# Patient Record
Sex: FEMALE | Race: WHITE | HISPANIC OR LATINO | Employment: UNEMPLOYED | URBAN - METROPOLITAN AREA
[De-identification: names, ages, dates, MRNs, and addresses within clinical notes are randomized per-mention and may not be internally consistent; named-entity substitution may affect disease eponyms.]

---

## 2017-06-13 ENCOUNTER — ALLSCRIPTS OFFICE VISIT (OUTPATIENT)
Dept: OTHER | Facility: OTHER | Age: 57
End: 2017-06-13

## 2017-08-03 ENCOUNTER — HOSPITAL ENCOUNTER (OUTPATIENT)
Dept: RADIOLOGY | Facility: HOSPITAL | Age: 57
Discharge: HOME | End: 2017-08-03
Payer: COMMERCIAL

## 2017-08-03 DIAGNOSIS — Z12.31 ENCOUNTER FOR SCREENING MAMMOGRAM FOR MALIGNANT NEOPLASM OF BREAST: ICD-10-CM

## 2017-08-03 PROCEDURE — G0202 SCR MAMMO BI INCL CAD: HCPCS

## 2018-01-13 VITALS
WEIGHT: 190.48 LBS | DIASTOLIC BLOOD PRESSURE: 74 MMHG | BODY MASS INDEX: 35.05 KG/M2 | HEIGHT: 62 IN | TEMPERATURE: 97.7 F | HEART RATE: 60 BPM | SYSTOLIC BLOOD PRESSURE: 108 MMHG

## 2018-01-13 NOTE — PROGRESS NOTES
Assessment    1  Osteoarthritis of multiple joints, unspecified osteoarthritis type (715 89) (M15 9)   2  Folliculitis (529 6) (F99 3)   3  Need for tetanus booster (V03 7) (Z23)   4  Obesity (278 00) (E66 9)    Plan  Esophageal reflux    · Omeprazole 40 MG Oral Capsule Delayed Release; TAKE 1 CAPSULE DAILY   · Follow-up visit in 6 months Evaluation and Treatment  Follow-up with Dr Juan R Rascon  Status: Hold For -  Scheduling  Requested for: 70Bst9617  External hemorrhoids    · Docusate Sodium 100 MG Oral Capsule; take 1 capsule by mouth twice a day if needed for  constipation  Folliculitis    · Cephalexin 500 MG Oral Capsule; TAKE 1 CAPSULE EVERY 12 HOURS UNTIL GONE  Health Maintenance    · Tdap (Boostrix)  Pain in ankle joint    · Naproxen 500 MG Oral Tablet; TAKE 1 TABLET EVERY 12 HOURS AS NEEDED  Visit for screening mammogram    · * MAMMO SCREENING BILATERAL W CAD; Status:Hold For - Scheduling; Requested for:12Stb4268;      Discussion/Summary  Discussion Summary:   Assessment/plan  1  Skin rash- Pusules visible on PE  Most likely folliculitis  Will prescribe Keflex 500mg BID X 5days  2  Bilateral foot pain- Possibly 2/2 osteoarthritis vs plantar fasciitis  Pain also occurs in RT shoulder  Currently undergoing physical therapy and naproxen for analgesia  Continue with current tx    3  Fatigue- Resolved  TSH, CMP, CBC normal  Patient on Paroxetime, trazodone with PMH of schizoaffective d/o  4  Mammogram- Will provide new script for mammogram    Counseling Documentation With Imm: The patient, patient's family was counseled regarding diagnostic results, instructions for management, importance of compliance with treatment  Medication SE Review and Pt Understands Tx: The treatment plan was reviewed with the patient/guardian   The patient/guardian understands and agrees with the treatment plan      Chief Complaint  Chief Complaint Free Text Note Form: rash under armpits      History of Present Illness  HPI: 49 yo female presenting for follow up visit  She denies fatigue but has been experiencing bilateral foot and RT shoulder pain  Since her last appointment she has been attending physical therapy once per week for her osteoarthritis and continues to take naproxen which provides bone pain relief  Patient also c/o a pruritic rash on the bilateral axillae for the past 2 weeks and does perspire but denies using deodorant  She did not complete her last mammogram due to being tardy to her appointment and requires a new script  [facility] Practices Required Assessment:   Pain Assessment   the patient states they have pain  The pain is located in the R shoulder  The patient describes the pain as sharp  (on a scale of 0 to 10, the patient rates the pain at 10 )    Prefered Language is  [place]alander  Primary Language is  [place]ander  Review of Systems  Complete-Female:   Constitutional: no fever, no chills and not feeling tired  ENT: no sore throat and no nasal discharge  Cardiovascular: no chest pain, no intermittent leg claudication, no palpitations and no lower extremity edema  Respiratory: no shortness of breath, no cough and no wheezing  Gastrointestinal: no abdominal pain, no nausea, no vomiting, no constipation and no diarrhea  Genitourinary: LMP 4 months ago, but no dysuria  Musculoskeletal: arthralgias and limb pain, but no joint swelling, no myalgias, no joint stiffness and no limb swelling  Integumentary: a rash, itching and skin lesion, but no skin wound  Neurological: No complaints of headache, no confusion, no convulsions, no numbness, no dizziness or fainting, no tingling, no limb weakness, no difficulty walking  Psychiatric: Not suicidal, no sleep disturbance, no anxiety or depression, no change in personality, no emotional problems  Active Problems    1  Allergic rhinitis (477 9) (J30 9)   2  Esophageal reflux (530 81) (K21 9)   3  External hemorrhoids (455 3) (K64 4)   4   Fatigue (780 79) (R53 83)   5  Need for influenza vaccination (V04 81) (Z23)   6  Onychomycosis of toenail (110 1) (B35 1)   7  Osteoarthritis of multiple joints, unspecified osteoarthritis type (715 89) (M15 9)   8  Pain in ankle joint (719 47) (M25 579)   9  Visit for screening mammogram (V76 12) (Z12 31)    Past Medical History    1  History of Anxiety disorder (300 00) (F41 9)   2  History of acute pharyngitis (V12 69) (Z87 09)   3  History of anemia (V12 3) (Z86 2)   4  History of bipolar disorder (V11 1) (Z86 59)   5  History of dermatitis (V13 3) (Z87 2)   6  History of insomnia (V13 89) (Z87 898)   7  History of menorrhagia (V13 29) (Z87 42)    Surgical History    1  History of  Section   2  History of Tubal Ligation    Family History  Mother    1  Family history of depression (V17 0) (Z81 8)   2  Family history of diabetes mellitus (V18 0) (Z83 3)  Father    3  Family history of Arthritis (V17 7)    Social History    · Never A Smoker  Social History Reviewed: The social history was reviewed and updated today  The social history was reviewed and is unchanged  Current Meds   1  Docusate Sodium 100 MG Oral Capsule; take 1 capsule by mouth twice a day if needed for   constipation; Therapy: 48OYL8596 to (Evaluate:76Fax4852)  Requested for: 2016; Last Rx:2016   Ordered   2  Fluticasone Propionate 50 MCG/ACT Nasal Suspension; use 2 sprays in each nostril once daily; Therapy: 52LOM1466 to (Evaluate:2016)  Requested for: 18JEE8956; Last Rx:36Yxw5187   Ordered   3  Loratadine 10 MG Oral Tablet; LINA 1 TABLETA POR VIA ORAL CADA MANANA BENEDICT FUERA   NECESARIOTAKE ONE TABLET BY MOUTH EVERY MORNING AS NEEDED; Therapy: 70Srt4877 to (Evaluate:2017)  Requested for: 80LDH0062; Last Rx:2016   Ordered   4  Naproxen 500 MG Oral Tablet; TAKE 1 TABLET EVERY 12 HOURS AS NEEDED; Therapy: 53Eil4464 to (Evaluate:11Web1636)  Requested for: 2016; Last Rx:2016   Ordered   5   Omeprazole 40 MG Oral Capsule Delayed Release; TAKE 1 CAPSULE DAILY; Therapy: 54UYU9521 to (Evaluate:53Nsj7478)  Requested for: 21Jan2016; Last Rx:21Jan2016   Ordered   6  PARoxetine HCl - 30 MG Oral Tablet; Therapy: 80JGW0994 to (Last Rx:04Oct2011)  Requested for: 54DRC0141 Ordered   7  Sitz Bath Miscellaneous; USE AS DIRECTED; Therapy: 00AMG4675 to (Last Rx:12Jun2015)  Requested for: 12Jun2015 Ordered   8  TraZODone HCl - 50 MG Oral Tablet; Therapy: 36KMT7820 to (Last Rx:04Oct2011)  Requested for: 39KQN8774 Ordered   9  Zolpidem Tartrate 10 MG Oral Tablet; Therapy: 34SSY3879 to (Last Rx:39Wjo1614)  Requested for: 79ADD7493 Ordered    Allergies    1  No Known Drug Allergies    Vitals  Vital Signs    Recorded: 50CAK4820 59:41PN   Systolic 905   Diastolic 70   Heart Rate 78   Temperature 98 1 F   Height 5 ft 2 in   Weight 187 lb 6 24 oz   BMI Calculated 34 27   BSA Calculated 1 86     Physical Exam    Constitutional   General appearance: No acute distress, well appearing and well nourished  Eyes   Conjunctiva and lids: No swelling, erythema or discharge  Ears, Nose, Mouth, and Throat   External inspection of ears and nose: Normal     Oropharynx: Normal with no erythema, edema, exudate or lesions  Pulmonary   Respiratory effort: No increased work of breathing or signs of respiratory distress  Auscultation of lungs: Clear to auscultation  Cardiovascular   Palpation of heart: Normal PMI, no thrills  Auscultation of heart: Normal rate and rhythm, normal S1 and S2, without murmurs  Examination of extremities for edema and/or varicosities: Normal     Carotid pulses: Normal     Abdomen   Abdomen: Non-tender, no masses  Musculoskeletal   Inspection/palpation of joints, bones, and muscles: Normal     Skin   Skin and subcutaneous tissue: Abnormal   papular/vesicular rash on the axillae bilaterally with multiple skin tags visible  Neurologic   Cranial nerves: Cranial nerves 2-12 intact      Psychiatric   Mood and affect: Normal          Results/Data  PHQ-2 Adult Depression Screening 84Poo0849 12:25PM User, Ahs     Test Name Result Flag Reference   PHQ-2 Adult Depression Score 0     Over the last two weeks, how often have you been bothered by any of the following problems? Little interest or pleasure in doing things: Not at all - 0  Feeling down, depressed, or hopeless: Not at all - 0   PHQ-2 Adult Depression Screening Negative         Attending Note  Attending Note: Attending Note: I discussed the case with the Resident and reviewed the Resident's note and I agree with the Resident management plan as it was presented to me  Level of Participation: I was present in clinic and examined the patient  Diagnosis and Plan: Multiple pustules along bra line both axillae  Advised to not wear bra in the house, use keflex and warm packs on the areas  Needs Tdap today  Mammo slip given  Future Appointments    Date/Time Provider Specialty Site   02/02/2017 02:10 PM PRASAD Cyr   [facility] [address] PCP     Signatures   Electronically signed by : PRASAD Junior ; Aug 30 2016  1:22PM EST                       (Author)    Electronically signed by : PRASAD Katz ; Aug 30 2016  1:38PM EST                       (Co-author)

## 2018-02-22 ENCOUNTER — OFFICE VISIT (OUTPATIENT)
Dept: INTERNAL MEDICINE CLINIC | Facility: CLINIC | Age: 58
End: 2018-02-22
Payer: COMMERCIAL

## 2018-02-22 VITALS
SYSTOLIC BLOOD PRESSURE: 124 MMHG | DIASTOLIC BLOOD PRESSURE: 90 MMHG | TEMPERATURE: 97.7 F | HEIGHT: 62 IN | HEART RATE: 84 BPM | BODY MASS INDEX: 35.13 KG/M2 | WEIGHT: 190.92 LBS

## 2018-02-22 DIAGNOSIS — N39.3 STRESS INCONTINENCE: ICD-10-CM

## 2018-02-22 DIAGNOSIS — J06.9 VIRAL UPPER RESPIRATORY TRACT INFECTION: Primary | ICD-10-CM

## 2018-02-22 DIAGNOSIS — F32.A DEPRESSION: ICD-10-CM

## 2018-02-22 DIAGNOSIS — G47.00 INSOMNIA: ICD-10-CM

## 2018-02-22 DIAGNOSIS — Z11.59 ENCOUNTER FOR HEPATITIS C SCREENING TEST FOR LOW RISK PATIENT: ICD-10-CM

## 2018-02-22 DIAGNOSIS — Z23 NEED FOR PROPHYLACTIC VACCINATION AND INOCULATION AGAINST INFLUENZA: ICD-10-CM

## 2018-02-22 PROCEDURE — 99213 OFFICE O/P EST LOW 20 MIN: CPT | Performed by: INTERNAL MEDICINE

## 2018-02-22 PROCEDURE — 3725F SCREEN DEPRESSION PERFORMED: CPT | Performed by: INTERNAL MEDICINE

## 2018-02-22 NOTE — PROGRESS NOTES
INTERNAL MEDICINE FOLLOW-UP OFFICE VISIT  [facility]  [address] [place] [address]    NAME: Max Otoole  AGE: 56 y o  SEX: female    DATE OF ENCOUNTER: 2/22/2018    Assessment and Plan     Problem List Items Addressed This Visit        Respiratory    Viral upper respiratory tract infection - Primary     Start Delsym for cough HS PRN         Relevant Medications    Dextromethorphan-Guaifenesin (DELSYM COUGH/CHEST CONGEST DM) 5-100 MG/5ML LIQD       Other    Encounter for hepatitis C screening test for low risk patient      recently diagnosed, pt would like to be screened, will check today         Relevant Orders    Hepatitis C Ab W/Refl To HCV RNA, Qn, PCR    Depression    Stress incontinence     Concurrent UTI symptoms, check UA         Relevant Orders    Urinalysis with reflex to microscopic    Insomnia     On Ambien by Psych and tolerating well           Other Visit Diagnoses     Need for prophylactic vaccination and inoculation against influenza            Orders Placed This Encounter   Procedures    Hepatitis C Ab W/Refl To HCV RNA, Qn, PCR    Urinalysis with reflex to microscopic     - Counseling Documentation: patient was counseled regarding: diagnostic results, instructions for management, risk factor reductions, prognosis, patient and family education, impressions, risks and benefits of treatment options and importance of compliance with treatment    Chief Complaint     Chief Complaint   Patient presents with    Physical Exam     History of Present Illness     Hubert Zazueta presents today for physical exam and for Hepatitis screening  Her  was recently diagnosed with Hepatitis C and she would like to be screened  She also reports recent URI symptoms with persistent cough which has been keeping her up at night  She denies fever or chills, nausea, vomiting or diarrhea   She does report stress incontinence of urine with coughing for the past week       Urinary Frequency    This is a new problem  The current episode started in the past 7 days  The problem occurs every urination  The problem has been unchanged  The quality of the pain is described as burning  The patient is experiencing no pain  There has been no fever  She is sexually active  There is no history of pyelonephritis  Associated symptoms include frequency  Pertinent negatives include no chills, flank pain, hematuria, nausea, sweats or vomiting  She has tried nothing for the symptoms  URI    This is a new problem  The current episode started in the past 7 days  The problem has been gradually improving  There has been no fever  Associated symptoms include congestion and coughing  Pertinent negatives include no nausea, plugged ear sensation, sinus pain, sore throat or vomiting  She has tried nothing for the symptoms  The following portions of the patient's history were reviewed and updated as appropriate: allergies, current medications, past family history, past medical history, past social history, past surgical history and problem list     Review of Systems     Review of Systems   Constitutional: Negative for chills, fatigue and fever  HENT: Positive for congestion  Negative for sinus pain and sore throat  Respiratory: Positive for cough  Cardiovascular: Negative  Gastrointestinal: Negative for nausea and vomiting  Endocrine: Negative  Genitourinary: Positive for frequency  Negative for flank pain and hematuria  Musculoskeletal: Negative  Skin: Negative  Allergic/Immunologic: Negative  Neurological: Negative  Psychiatric/Behavioral: Positive for dysphoric mood  All other systems reviewed and are negative        Active Problem List     Patient Active Problem List   Diagnosis    Encounter for hepatitis C screening test for low risk patient    Viral upper respiratory tract infection    Depression    Stress incontinence    Insomnia       Objective     BP 124/90   Pulse 84   Temp 97 7 °F (36 5 °C)   Ht 5' 2" (1 575 m)   Wt 86 6 kg (190 lb 14 7 oz)   BMI 34 92 kg/m²     Physical Exam   Constitutional: She is oriented to person, place, and time  She appears well-developed and well-nourished  No distress  HENT:   Head: Normocephalic and atraumatic  Right Ear: External ear normal    Left Ear: External ear normal    Mouth/Throat: Oropharynx is clear and moist  No oropharyngeal exudate  Eyes: Conjunctivae are normal  Pupils are equal, round, and reactive to light  Right eye exhibits no discharge  Left eye exhibits no discharge  No scleral icterus  Neck: Normal range of motion  Neck supple  No JVD present  No tracheal deviation present  No thyromegaly present  Cardiovascular: Normal rate, regular rhythm and intact distal pulses  Exam reveals no gallop and no friction rub  No murmur heard  Pulmonary/Chest: Effort normal and breath sounds normal  No respiratory distress  She has no wheezes  Abdominal: Soft  There is tenderness  Musculoskeletal: Normal range of motion  She exhibits no edema or tenderness  Neurological: She is alert and oriented to person, place, and time  Skin: Skin is warm and dry  No rash noted  She is not diaphoretic  Psychiatric: She has a normal mood and affect  Nursing note and vitals reviewed      Pertinent Laboratory/Diagnostic Studies:  No labs for review    Current Medications     Current Outpatient Prescriptions:     loratadine (CLARITIN) 10 mg tablet, Take by mouth, Disp: , Rfl:     naproxen (NAPROSYN) 500 mg tablet, Take 1 tablet by mouth every 12 (twelve) hours as needed, Disp: , Rfl:     omeprazole (PriLOSEC) 40 MG capsule, Take 1 capsule by mouth daily, Disp: , Rfl:     PARoxetine (PAXIL) 20 mg tablet, Take 20 mg by mouth daily, Disp: , Rfl: 1    traZODone (DESYREL) 50 mg tablet, TAKE 1 TABLET BY MOUTH AT BEDTIME, MAY TAKE UP TO 2 TABLETS FOR SLEEP, Disp: , Rfl: 1    zolpidem (AMBIEN) 10 mg tablet, Take 10 mg by mouth daily, Disp: , Rfl: 1    Dextromethorphan-Guaifenesin (DELSYM COUGH/CHEST CONGEST DM) 5-100 MG/5ML LIQD, Take 10 mL by mouth daily at bedtime as needed (cough), Disp: 118 mL, Rfl: 0    docusate sodium (COLACE) 100 mg capsule, Take by mouth, Disp: , Rfl:     fluticasone (FLONASE) 50 mcg/act nasal spray, 2 sprays into each nostril daily, Disp: , Rfl:     Misc  Devices (SITZ BATH) MISC, by Does not apply route, Disp: , Rfl:     Health Maintenance     There are no preventive care reminders to display for this patient  Immunization History   Administered Date(s) Administered    Influenza 11/02/2017    Influenza Quadrivalent Preservative Free 3 years and older IM 01/21/2016    Tdap 08/30/2016     Kye STANTON    Internal Medicine PGY-3  2/22/2018 5:55 PM

## 2018-02-22 NOTE — PATIENT INSTRUCTIONS
Incontinencia urinaria   CUIDADO AMBULATORIO:   La incontinencia urinaria  se produce cuando usted pierde el control de cruz vejiga  La incontinencia urinaria ocurre cuando la vejiga no puede almacenar o vaciar la orina correctamente  Los 3 tipos más comunes de incontinencia urinaria son la incontinencia de esfuerzo o por estrés, la incontinencia de [place], o ambas  Los síntomas más comunes incluyen los siguientes:   · Mc vejiga que no se vacía por completo al Carren Ovens    · Dilcia Slay y sentir deseos de orinar de inmediato    · Gotear orina mientras duerme o despertarse con la urgencia de orinar    · Pérdida de orina cuando tose, estornuda, hace ejercicio o se ríe  Busque atención médica de inmediato si:   · Usted tiene dolor intenso  · Usted está confundido o no puede pensar con claridad  Pregúntele a cruz Lillian Luis vitaminas y minerales son adecuados para usted  · Usted tiene fiebre  · Usted orina con maría  · Siente dolor al Carren Ovens  · Usted tiene dolor nuevo o peor, aun después del [place]  · Cruz orina está turbia o tiene mal olor  · Cruz boca está seca o tiene cambios en la visión  · Usted tiene preguntas o inquietudes acerca de cruz condición o cuidado  El tratamiento para la incontinencia urinaria  podría incluir medicamentos para ayudarlo a reforzar el control de cruz vejiga  Es posible que usted necesite mc cirugía u otros procedimientos para fortalecer los músculos del piso pélvico o para reparar daños a partes de cruz sistema urinario  Renetta ejercicios del músculo pélvico con frecuencia:  Lynnette músculos pélvicos le ayudan a detener la orina  Contraiga estos músculos por 5 segundos, después relájelos por 5 segundos  Aumente gradualmente a 10 segundos  Renetta 3 series de 15 repeticiones al día o ramona se le indique  [place] le ayudará a fortalecer lynnette músculos pélvicos y a mejorar el control de cruz vejiga    Entrene cruz vejiga:  Vaya al baño layla horas programadas, ramona por ejemplo cada 2 horas, aunque no sienta la urgencia de ir  Usted también puede tratar de aguantar dang orina cuando usted tiene ganas de ir al baño  Por ejemplo, contenga dang orina por 5 minutos cuando sienta ganas de orinar  Conforme se le vaya facilitando, contenga la orina por 10 minutos  Cuidados personales:   · Un catéter  podría usarse para ayudar a vaciar dang vejiga  Un catéter es mc sonda pequeña de plástico que se coloca en dang vejiga para drenar dang orina  Dang médico podría indicarle que use un catéter para evitar que dang vejiga se llene demasiado y que se gotee la [facility]  · Mantenga un registro de la incontinencia urinaria  Anote con qué frecuencia se gotea dang orina y cuánta orina pierde  Escriba qué estaba haciendo cuando se goteó la [facility]  · [address] se le haya indicado  Pregunte a dang médico sobre la cantidad de líquido que necesita carlie todos los días y cuáles le recomienda  Es posible que deba controlar la cantidad de líquido que marcy para ayudar a controlar el [facility]  Limite o no consuma bebidas que contengan cafeína o alcohol  No tome líquido antes de irse a dormir  · Prevenga el estreñimiento  Consuma mc variedad de alimentos ricos en fibra  Los [employer], los frijoles, las verduras y los panes integrales son Deetta Jazmyn  El jugo de ciruelas podría ayudarle a suavizar las evacuaciones intestinales  Caminar es la mejor forma de estimular lynnette intestinos para tener mc evacuación intestinal     · Ejercítese regularmente y [place] un peso saludable  Pregunte a dang médico cuánto debería pesar y pídale que le recomiende un programa de ejercicios adecuado para usted  La pérdida de peso y el ejercicio harán que dang vejiga esté bajo menos presión y contribuirán a controlar el [facility]  Pida que le ayude a crear un plan para bajar de peso si usted tiene sobrepeso  Acuda a lynnette consultas de control con dang médico según le indicaron    Anote lynnette preguntas para que se acuerde de ck layla lynnette visitas  © 2017 [address] Information is for End User's use only and may not be sold, redistributed or otherwise used for commercial purposes  All illustrations and images included in CareNotes® are the copyrighted property of A D A M , Inc  or Shahab Hawk  Esta información es sólo para uso en educación  Cruz intención no es darle un consejo médico sobre enfermedades o tratamientos  Colsulte con cruz Joseph La farmacéutico antes de seguir cualquier régimen médico para saber si es seguro y efectivo para usted

## 2018-02-28 NOTE — PROGRESS NOTES
Spoke with patient and reminded her to have her URINALYSIS WITH REFLEX TO MICROSCOPIC done, patient states she will go have it done

## 2018-04-12 ENCOUNTER — LAB (OUTPATIENT)
Dept: LAB | Facility: HOSPITAL | Age: 58
End: 2018-04-12
Payer: COMMERCIAL

## 2018-04-12 DIAGNOSIS — Z11.59 ENCOUNTER FOR HEPATITIS C SCREENING TEST FOR LOW RISK PATIENT: ICD-10-CM

## 2018-04-12 LAB
BILIRUB UR QL STRIP: NEGATIVE
CLARITY UR: CLEAR
COLOR UR: YELLOW
GLUCOSE UR STRIP-MCNC: NEGATIVE MG/DL
HGB UR QL STRIP.AUTO: NEGATIVE
KETONES UR STRIP-MCNC: NEGATIVE MG/DL
LEUKOCYTE ESTERASE UR QL STRIP: NEGATIVE
NITRITE UR QL STRIP: NEGATIVE
PH UR STRIP.AUTO: 7 [PH] (ref 4.5–8)
PROT UR STRIP-MCNC: NEGATIVE MG/DL
SP GR UR STRIP.AUTO: 1.02 (ref 1–1.03)
UROBILINOGEN UR QL STRIP.AUTO: 0.2 E.U./DL

## 2018-04-12 PROCEDURE — 86803 HEPATITIS C AB TEST: CPT

## 2018-04-12 PROCEDURE — 36415 COLL VENOUS BLD VENIPUNCTURE: CPT

## 2018-04-12 PROCEDURE — 81003 URINALYSIS AUTO W/O SCOPE: CPT | Performed by: INTERNAL MEDICINE

## 2018-04-14 LAB — MISCELLANEOUS LAB TEST RESULT: NORMAL

## 2018-04-16 NOTE — PROGRESS NOTES
4/16/2018  Re: Alyson Ngo    Results reviewed: Hepatitis C labs  Abnormalities include: None, Hep C negative for Ab   Recommendations: No intervention at this time

## 2018-05-22 ENCOUNTER — OFFICE VISIT (OUTPATIENT)
Dept: INTERNAL MEDICINE CLINIC | Facility: CLINIC | Age: 58
End: 2018-05-22
Payer: COMMERCIAL

## 2018-05-22 VITALS
BODY MASS INDEX: 35.62 KG/M2 | HEIGHT: 62 IN | DIASTOLIC BLOOD PRESSURE: 90 MMHG | WEIGHT: 193.56 LBS | TEMPERATURE: 98 F | HEART RATE: 104 BPM | SYSTOLIC BLOOD PRESSURE: 128 MMHG

## 2018-05-22 DIAGNOSIS — Z12.39 BREAST CANCER SCREENING: ICD-10-CM

## 2018-05-22 DIAGNOSIS — G62.9 NEUROPATHY: ICD-10-CM

## 2018-05-22 DIAGNOSIS — M62.838 CERVICAL PARASPINAL MUSCLE SPASM: ICD-10-CM

## 2018-05-22 DIAGNOSIS — Z12.31 BREAST CANCER SCREENING BY MAMMOGRAM: Primary | ICD-10-CM

## 2018-05-22 PROBLEM — Z12.11 COLON CANCER SCREENING: Status: ACTIVE | Noted: 2018-05-22

## 2018-05-22 PROCEDURE — 99213 OFFICE O/P EST LOW 20 MIN: CPT | Performed by: INTERNAL MEDICINE

## 2018-05-22 RX ORDER — CYCLOBENZAPRINE HCL 5 MG
10 TABLET ORAL 3 TIMES DAILY PRN
Qty: 30 TABLET | Refills: 0 | Status: SHIPPED | OUTPATIENT
Start: 2018-05-22 | End: 2019-01-08

## 2018-05-22 NOTE — PATIENT INSTRUCTIONS
Dolor de marcel crónico   CUIDADO AMBULATORIO:   El dolor de marcel crónico  podría comenzar a acumularse lentamente con el tiempo  El dolor de marcel es crónico si dura más de 3 meses  El dolor podría ir y venir, o podría empeorar con ciertos movimientos  El dolor podría sentirse solamente en dang marcel, o podría pasarse a evelina brazos, espalda u hombros  Es posible que usted sienta dolor que comienza en otra parte de dang cuerpo y se pasa a dang marcel  Usted podría sentir dolor de marcel por años  Algunos tipos de dolor de marcel pueden ser permanentes  Busque atención médica de inmediato si:   · Usted tiene mc lesión que le provoca dolor en el marcel y dolor punzante debajo de evelina brazos o piernas  · Dang dolor de marcel se agrava repentinamente  · Usted tiene dolor de marcel junto con entumecimiento, hormigueo o debilidad en evelina brazos o piernas  · Usted tiene rigidez en el marcel, dolor de [place]  Pregúntele a dang [place] vitaminas y minerales son adecuados para usted  · Usted tiene nuevos síntomas o evelina síntomas empeoran  · Evelina síntomas continúan aún después del [place]  · Usted tiene preguntas o inquietudes acerca de dang condición o cuidado  El tratamiento  podría incluir cualquiera de lo siguiente, dependiendo de lo que esté provocando dang dolor:  · Medicamentos,  podrían ser recetados o recomendados para el dolor por dang médico  Es posible que usted necesite medicamento para tratar el dolor de nervios o para detener los espasmos musculares  También podrían darle medicamentos para reducir la inflamación  Dang médico podría inyectar medicamento a un nervio para bloquear el dolor  El medicamento de venta sin receta AINEs o acetaminofén podría ser recomendado para tratar el dolor o inflamación leve  · La tracción  se [place] para aliviar la presión de los nervios  Le estirarán la candy cuidadosamente alejándola de dang marcel   Leola estira los músculos y los ligamentos y le da más espacio a dang columna  Dang médico le indicará qué tipo de tracción ayudará a dang dolor de marcel  No  use dispositivos de tracción en dang casa a menos que se lo indique dang médico     · Cirugía  podría ser necesaria si el dolor es severo o si otros tratamientos no funcionan  La cirugía no ayudará para todos los tipos de [id number]  Usted podría necesitar mc cirugía para estabilizar un hueso fracturado o para remover un tumor  La cirugía también podría usarse para ampliar la columna vertebral estrecha o para remover un disco [employer] del marcel  Controle o evite el dolor de marcel crónico:   · Repose el marcel ramona se lo indiquen  No realice movimientos repentinos, ramona voltear dang candy rápidamente  Dang médico podría recomendarle que use un collarín cervical por un periodo corto de [place]  El collarín evitará que usted [place] dang [place]  [place] ayudará a darle tiempo a dang marcel para que sane si es que mc lesión está provocando dang dolor  Pregunte a dnag médico cuándo puede volver a practicar deportes o realizar otras actividades cotidianas  · Aplique calor según indicaciones  El calor ayuda a aliviar el dolor y la inflamación  Use mc envoltura caliente o empape mc toalla pequeña en agua tibia  Escurra el exceso de [place]  Aplique la venda caliente o la toalla por 20 minutos cada hora o ramona se le indique  · Aplique hielo según las indicaciones  El hielo ayuda a aliviar el dolor y la inflamación, y a evitar daño al tejido  Use un paquete con hielo o ponga hielo en mc bolsa  Cubra el paquete con hielo o la espalda con mc toalla antes de aplicarlo en dang marcel  Aplique el paquete de hielo o la bolsa por 15 minutos cada hora o ramona se lo indiquen  Dang médico puede indicarle con qué frecuencia aplicar el hielo  · [place] ejercicios para el marcel ramona se lo indiquen  Los ejercicios para el marcel ayudan a [employer] y a aumentar el rango de [place]   Dang médico le indicará cuáles ejercicios son adecuados para usted  Podría darle instrucciones o podría recomendarle que acuda con un fisioterapeuta  Dang médico o terapeuta pueden asegurarse que usted esté haciendo estos ejercicios correctamente  · Mantenga mc buena postura  Trate de mantener dang candy y hombros levantados cuando se siente  Si usted trabaja en frente de mc computadora, asegúrese de que el monitor esté al nivel adecuado  Usted no debería tener que mirar hacia abajo para lisa la pantalla  Tampoco debe tener que inclinarse hacia adelante para poder leer lo que está en la pantalla  Asegúrese de que dang teclado, ratón y otros artículos de computadora estén colocados en donde usted no tenga que extender lynnette hombros para alcanzarlos  [place] a menudo si usted trabaja frente a mc computadora o permanece sentado layla largos períodos  Estírese o camine para [place] de dang marcel relajados  Acuda a lynnette consultas de control con dang médico según le indicaron  Dang médico podría referirlo a un especialista si dang dolor no mejora con el tratamiento  Anote lynnette preguntas para que se acuerde de hacerlas layla lynnette visitas  © 2017 [facility] Information is for End User's use only and may not be sold, redistributed or otherwise used for commercial purposes  All illustrations and images included in CareNotes® are the copyrighted property of A D A M , Inc  or Shahab Hawk  Esta información es sólo para uso en educación  Dang intención no es darle un consejo médico sobre enfermedades o tratamientos  Colsulte con dang [employer] antes de seguir cualquier régimen médico para saber si es seguro y efectivo para usted

## 2018-05-22 NOTE — PROGRESS NOTES
INTERNAL MEDICINE FOLLOW-UP OFFICE VISIT  [facility]  [address] [place] [address]    NAME: Bindu Cedeño  AGE: 56 y o  SEX: female    DATE OF ENCOUNTER: 5/22/2018    Assessment and Plan     Problem List Items Addressed This Visit        Nervous and Auditory    Neuropathy (Chronic)    Relevant Orders    CBC (Includes Diff/Plt) (Refl)    Comprehensive metabolic panel    Vitamin B12       Other    Colon cancer screening - Primary     C-Scope 9/2014: normal, repeat in 10yrs (2024)         Breast cancer screening     Last Mammo 8/2017, Viviana Budds 1 neg, repeat due 8/2018         Cervical paraspinal muscle spasm    Relevant Medications    cyclobenzaprine (FLEXERIL) 5 mg tablet        Orders Placed This Encounter   Procedures    CBC (Includes Diff/Plt) (Refl)    Comprehensive metabolic panel    Vitamin B12     - Counseling Documentation: patient was counseled regarding: diagnostic results, instructions for management, risk factor reductions, prognosis, patient and family education, impressions, risks and benefits of treatment options and importance of compliance with treatment    Chief Complaint     Chief Complaint   Patient presents with    Follow-up     routine     History of Present Illness     Gissell Montanez is here for evaluation of cervical neck pain as well as numbness and tingling in both his hands and feet for the past few months  She reports that she is having some episodes of tripping and falls due to the numbness and is worried that she cannot go up and down the stairs in her apartment anymore and she would like a letter to the housing authority to have her move to a one story apt  She also complains of cervical neck pain and spasm that is worse over the past few days and is causing her difficulty trying to sleep at night due to pain  She has not tried anything for it         The following portions of the patient's history were reviewed and updated as appropriate: allergies, current medications, past family history, past medical history, past social history, past surgical history and problem list     Review of Systems     Review of Systems   Constitutional: Negative for chills and fever  HENT: Negative  Eyes: Negative  Respiratory: Negative  Cardiovascular: Negative  Gastrointestinal: Negative  Endocrine: Negative  Genitourinary: Negative  Musculoskeletal: Positive for myalgias and neck pain  Skin: Negative  Neurological: Positive for numbness  Hematological: Negative  Psychiatric/Behavioral: Negative  All other systems reviewed and are negative  Active Problem List     Patient Active Problem List   Diagnosis    Encounter for hepatitis C screening test for low risk patient    Viral upper respiratory tract infection    Depression    Stress incontinence    Insomnia    Colon cancer screening    Breast cancer screening    Neuropathy    Cervical paraspinal muscle spasm       Objective     /90   Pulse 104   Temp 98 °F (36 7 °C)   Ht 5' 2" (1 575 m)   Wt 87 8 kg (193 lb 9 oz)   BMI 35 40 kg/m²     Physical Exam   Constitutional: She is oriented to person, place, and time  She appears well-developed and well-nourished  No distress  HENT:   Head: Normocephalic and atraumatic  Mouth/Throat: Oropharynx is clear and moist  No oropharyngeal exudate  Eyes: Conjunctivae are normal  Pupils are equal, round, and reactive to light  No scleral icterus  Neck: Normal range of motion  Neck supple  No tracheal deviation present  No thyromegaly present  Cardiovascular: Normal rate, regular rhythm, normal heart sounds and intact distal pulses  Exam reveals no gallop and no friction rub  No murmur heard  Pulmonary/Chest: Effort normal  She has no wheezes  Abdominal: Soft  Bowel sounds are normal    Musculoskeletal: Normal range of motion  She exhibits no edema or deformity     Cervical paraspinal muscle spasm   Neurological: She is alert and oriented to person, place, and time  No cranial nerve deficit  Coordination normal    Skin: Skin is warm and dry  She is not diaphoretic  Psychiatric: She has a normal mood and affect  Nursing note and vitals reviewed  Pertinent Laboratory/Diagnostic Studies:    Current Medications     Current Outpatient Prescriptions:     [facility] TUSSIN -10 MG/5ML oral liquid, TAKE 10 ML BY MOUTH DAILY AT BEDTIME AS NEEDED (COUGH), Disp: , Rfl: 0    Dextromethorphan-Guaifenesin (DELSYM COUGH/CHEST CONGEST DM) 5-100 MG/5ML LIQD, Take 10 mL by mouth daily at bedtime as needed (cough), Disp: 118 mL, Rfl: 0    docusate sodium (COLACE) 100 mg capsule, Take by mouth, Disp: , Rfl:     fluticasone (FLONASE) 50 mcg/act nasal spray, 2 sprays into each nostril daily, Disp: , Rfl:     loratadine (CLARITIN) 10 mg tablet, Take by mouth, Disp: , Rfl:     naproxen (NAPROSYN) 500 mg tablet, Take 1 tablet by mouth every 12 (twelve) hours as needed, Disp: , Rfl:     omeprazole (PriLOSEC) 40 MG capsule, Take 1 capsule by mouth daily, Disp: , Rfl:     PARoxetine (PAXIL) 20 mg tablet, Take 20 mg by mouth daily, Disp: , Rfl: 1    traZODone (DESYREL) 50 mg tablet, TAKE 1 TABLET BY MOUTH AT BEDTIME, MAY TAKE UP TO 2 TABLETS FOR SLEEP, Disp: , Rfl: 1    zolpidem (AMBIEN) 10 mg tablet, Take 10 mg by mouth daily, Disp: , Rfl: 1    cyclobenzaprine (FLEXERIL) 5 mg tablet, Take 2 tablets (10 mg total) by mouth 3 (three) times a day as needed for muscle spasms, Disp: 30 tablet, Rfl: 0    Misc   Devices (SITZ BATH) MISC, by Does not apply route, Disp: , Rfl:     Health Maintenance     Health Maintenance   Topic Date Due    HIV SCREENING  1960    Depression Screening PHQ-9  12/29/1974    INFLUENZA VACCINE  09/01/2018    COLONOSCOPY  11/19/2024    DTaP,Tdap,and Td Vaccines (2 - Td) 08/30/2026    Hepatitis C Screening  Completed     Immunization History   Administered Date(s) Administered    Influenza 11/02/2017    Influenza Quadrivalent Preservative Free 3 years and older IM 01/21/2016    Tdap 08/30/2016     James STANTON    Internal Medicine PGY-3  5/22/2018 5:54 PM

## 2018-05-23 ENCOUNTER — APPOINTMENT (OUTPATIENT)
Dept: LAB | Facility: HOSPITAL | Age: 58
End: 2018-05-23
Payer: COMMERCIAL

## 2018-05-23 DIAGNOSIS — G62.9 NEUROPATHY: ICD-10-CM

## 2018-05-23 DIAGNOSIS — R73.9 HYPERGLYCEMIA: Primary | ICD-10-CM

## 2018-05-23 DIAGNOSIS — G62.9 PERIPHERAL NERVE DISORDER: Primary | ICD-10-CM

## 2018-05-23 LAB
ALBUMIN SERPL BCP-MCNC: 3.8 G/DL (ref 3.5–5)
ALP SERPL-CCNC: 56 U/L (ref 46–116)
ALT SERPL W P-5'-P-CCNC: 40 U/L (ref 12–78)
ANION GAP SERPL CALCULATED.3IONS-SCNC: 6 MMOL/L (ref 4–13)
AST SERPL W P-5'-P-CCNC: 26 U/L (ref 5–45)
BASOPHILS # BLD AUTO: 0.01 THOUSANDS/ΜL (ref 0–0.1)
BASOPHILS NFR BLD AUTO: 0 % (ref 0–1)
BILIRUB SERPL-MCNC: 0.44 MG/DL (ref 0.2–1)
BUN SERPL-MCNC: 13 MG/DL (ref 5–25)
CALCIUM SERPL-MCNC: 9 MG/DL (ref 8.3–10.1)
CHLORIDE SERPL-SCNC: 103 MMOL/L (ref 100–108)
CO2 SERPL-SCNC: 28 MMOL/L (ref 21–32)
CREAT SERPL-MCNC: 0.7 MG/DL (ref 0.6–1.3)
EOSINOPHIL # BLD AUTO: 0.14 THOUSAND/ΜL (ref 0–0.61)
EOSINOPHIL NFR BLD AUTO: 2 % (ref 0–6)
ERYTHROCYTE [DISTWIDTH] IN BLOOD BY AUTOMATED COUNT: 13.2 % (ref 11.6–15.1)
GFR SERPL CREATININE-BSD FRML MDRD: 98 ML/MIN/1.73SQ M
GLUCOSE P FAST SERPL-MCNC: 124 MG/DL (ref 65–99)
HCT VFR BLD AUTO: 41.3 % (ref 34.8–46.1)
HGB BLD-MCNC: 13.2 G/DL (ref 11.5–15.4)
LYMPHOCYTES # BLD AUTO: 1.43 THOUSANDS/ΜL (ref 0.6–4.47)
LYMPHOCYTES NFR BLD AUTO: 22 % (ref 14–44)
MCH RBC QN AUTO: 28.6 PG (ref 26.8–34.3)
MCHC RBC AUTO-ENTMCNC: 32 G/DL (ref 31.4–37.4)
MCV RBC AUTO: 89 FL (ref 82–98)
MONOCYTES # BLD AUTO: 0.5 THOUSAND/ΜL (ref 0.17–1.22)
MONOCYTES NFR BLD AUTO: 8 % (ref 4–12)
NEUTROPHILS # BLD AUTO: 4.31 THOUSANDS/ΜL (ref 1.85–7.62)
NEUTS SEG NFR BLD AUTO: 67 % (ref 43–75)
NRBC BLD AUTO-RTO: 0 /100 WBCS
PLATELET # BLD AUTO: 232 THOUSANDS/UL (ref 149–390)
PMV BLD AUTO: 12 FL (ref 8.9–12.7)
POTASSIUM SERPL-SCNC: 4.2 MMOL/L (ref 3.5–5.3)
PROT SERPL-MCNC: 8.2 G/DL (ref 6.4–8.2)
RBC # BLD AUTO: 4.62 MILLION/UL (ref 3.81–5.12)
SODIUM SERPL-SCNC: 137 MMOL/L (ref 136–145)
VIT B12 SERPL-MCNC: 426 PG/ML (ref 100–900)
WBC # BLD AUTO: 6.4 THOUSAND/UL (ref 4.31–10.16)

## 2018-05-23 PROCEDURE — 85025 COMPLETE CBC W/AUTO DIFF WBC: CPT

## 2018-05-23 PROCEDURE — 36415 COLL VENOUS BLD VENIPUNCTURE: CPT

## 2018-05-23 PROCEDURE — 82607 VITAMIN B-12: CPT

## 2018-05-23 PROCEDURE — 80053 COMPREHEN METABOLIC PANEL: CPT

## 2018-05-25 DIAGNOSIS — G62.9 NEUROPATHY: Primary | Chronic | ICD-10-CM

## 2018-05-29 RX ORDER — NAPROXEN 500 MG/1
500 TABLET ORAL EVERY 12 HOURS PRN
Qty: 60 TABLET | Refills: 0 | Status: SHIPPED | OUTPATIENT
Start: 2018-05-29 | End: 2019-08-05

## 2018-06-19 ENCOUNTER — APPOINTMENT (OUTPATIENT)
Dept: LAB | Facility: HOSPITAL | Age: 58
End: 2018-06-19
Payer: COMMERCIAL

## 2018-06-19 ENCOUNTER — OFFICE VISIT (OUTPATIENT)
Dept: INTERNAL MEDICINE CLINIC | Facility: CLINIC | Age: 58
End: 2018-06-19
Payer: COMMERCIAL

## 2018-06-19 ENCOUNTER — TRANSCRIBE ORDERS (OUTPATIENT)
Dept: LAB | Facility: HOSPITAL | Age: 58
End: 2018-06-19

## 2018-06-19 VITALS
DIASTOLIC BLOOD PRESSURE: 78 MMHG | HEIGHT: 62 IN | HEART RATE: 92 BPM | BODY MASS INDEX: 35.38 KG/M2 | SYSTOLIC BLOOD PRESSURE: 130 MMHG | WEIGHT: 192.24 LBS | TEMPERATURE: 97.9 F

## 2018-06-19 DIAGNOSIS — J30.2 SEASONAL ALLERGIC RHINITIS, UNSPECIFIED TRIGGER: ICD-10-CM

## 2018-06-19 DIAGNOSIS — R73.9 BLOOD GLUCOSE ELEVATED: Primary | ICD-10-CM

## 2018-06-19 DIAGNOSIS — G62.9 NEUROPATHY: Primary | Chronic | ICD-10-CM

## 2018-06-19 DIAGNOSIS — M62.838 CERVICAL PARASPINAL MUSCLE SPASM: ICD-10-CM

## 2018-06-19 PROBLEM — R73.09 ELEVATED GLUCOSE: Status: ACTIVE | Noted: 2018-06-19

## 2018-06-19 LAB
BASOPHILS # BLD AUTO: 0.03 THOUSANDS/ΜL (ref 0–0.1)
BASOPHILS NFR BLD AUTO: 0 % (ref 0–1)
EOSINOPHIL # BLD AUTO: 0.11 THOUSAND/ΜL (ref 0–0.61)
EOSINOPHIL NFR BLD AUTO: 1 % (ref 0–6)
ERYTHROCYTE [DISTWIDTH] IN BLOOD BY AUTOMATED COUNT: 12.7 % (ref 11.6–15.1)
EST. AVERAGE GLUCOSE BLD GHB EST-MCNC: 126 MG/DL
HBA1C MFR BLD: 6 % (ref 4.2–6.3)
HCT VFR BLD AUTO: 40 % (ref 34.8–46.1)
HGB BLD-MCNC: 12.6 G/DL (ref 11.5–15.4)
IMM GRANULOCYTES # BLD AUTO: 0.02 THOUSAND/UL (ref 0–0.2)
IMM GRANULOCYTES NFR BLD AUTO: 0 % (ref 0–2)
LYMPHOCYTES # BLD AUTO: 1.61 THOUSANDS/ΜL (ref 0.6–4.47)
LYMPHOCYTES NFR BLD AUTO: 21 % (ref 14–44)
MCH RBC QN AUTO: 28.3 PG (ref 26.8–34.3)
MCHC RBC AUTO-ENTMCNC: 31.5 G/DL (ref 31.4–37.4)
MCV RBC AUTO: 90 FL (ref 82–98)
MONOCYTES # BLD AUTO: 0.59 THOUSAND/ΜL (ref 0.17–1.22)
MONOCYTES NFR BLD AUTO: 8 % (ref 4–12)
NEUTROPHILS # BLD AUTO: 5.4 THOUSANDS/ΜL (ref 1.85–7.62)
NEUTS SEG NFR BLD AUTO: 70 % (ref 43–75)
NRBC BLD AUTO-RTO: 0 /100 WBCS
PLATELET # BLD AUTO: 233 THOUSANDS/UL (ref 149–390)
PMV BLD AUTO: 12.5 FL (ref 8.9–12.7)
RBC # BLD AUTO: 4.45 MILLION/UL (ref 3.81–5.12)
WBC # BLD AUTO: 7.76 THOUSAND/UL (ref 4.31–10.16)

## 2018-06-19 PROCEDURE — 36415 COLL VENOUS BLD VENIPUNCTURE: CPT | Performed by: INTERNAL MEDICINE

## 2018-06-19 PROCEDURE — 83036 HEMOGLOBIN GLYCOSYLATED A1C: CPT | Performed by: INTERNAL MEDICINE

## 2018-06-19 PROCEDURE — 85025 COMPLETE CBC W/AUTO DIFF WBC: CPT

## 2018-06-19 PROCEDURE — 99213 OFFICE O/P EST LOW 20 MIN: CPT | Performed by: INTERNAL MEDICINE

## 2018-06-19 PROCEDURE — 3008F BODY MASS INDEX DOCD: CPT | Performed by: INTERNAL MEDICINE

## 2018-06-19 RX ORDER — LORATADINE 10 MG/1
10 TABLET ORAL DAILY
Qty: 30 TABLET | Refills: 2 | Status: SHIPPED | OUTPATIENT
Start: 2018-06-19 | End: 2019-05-31

## 2018-06-19 RX ORDER — DIAPER,BRIEF,INFANT-TODD,DISP
EACH MISCELLANEOUS 4 TIMES DAILY PRN
Qty: 30 G | Refills: 0 | Status: SHIPPED | OUTPATIENT
Start: 2018-06-19 | End: 2019-05-31

## 2018-06-19 NOTE — PROGRESS NOTES
Assessment/Plan   Diagnoses and all orders for this visit:    Neuropathy    Cervical paraspinal muscle spasm      Plan:   1  Elevated glucose- patient to get A1c test performed  If in diabetic range discussed with her that we will start Metformin  2  Cervicalgia/neck muscle spasm- Resolved  No need for imaging  Has muscle relaxer PRN  Subjective   Patient ID: Angel Durham is a 56 y o  female  CC: Elevated glucose    History of Present Illness: The patient is here for 1 month follow-up  Last visit she was complaining of neck pain with bilateral numbness/tingling down both arms  CBC was normal, B12 level was normal, CMP normal except for elevated glucose of 124  Hg A1c was ordered but not yet performed  The patient has no complaints today  Her neck pain is better  Her mother and brother do have a history of diabetes mellitus  Review of Systems  Constitutional: Negative for appetite change  Negative for activity change, fatigue and unexpected weight change  Respiratory: Negative for shortness of breath, wheezing, cough  Musculoskeletal: Negative for arthralgias  Neurological: Negative for dizziness, light-headedness and headaches       Objective   Vitals:    06/19/18 1417   BP: 130/78   Pulse: 92   Temp: 97 9 °F (36 6 °C)        Physical Exam  GEN: AAOx3, NAD, obese  HEENT: MM moist  No scleral icterus  CV: RRR, nml S1/S2, no murmur appreciated  Lungs: CTA bilaterally, no w/r/r  Abd: Obese  Psych: Normal mood and affect      Current Outpatient Prescriptions:     cyclobenzaprine (FLEXERIL) 5 mg tablet, Take 2 tablets (10 mg total) by mouth 3 (three) times a day as needed for muscle spasms, Disp: 30 tablet, Rfl: 0    fluticasone (FLONASE) 50 mcg/act nasal spray, 2 sprays into each nostril daily, Disp: , Rfl:     naproxen (NAPROSYN) 500 mg tablet, Take 1 tablet (500 mg total) by mouth every 12 (twelve) hours as needed for mild pain, Disp: 60 tablet, Rfl: 0    omeprazole (PriLOSEC) 40 MG capsule, Take 1 capsule by mouth daily, Disp: , Rfl:     PARoxetine (PAXIL) 20 mg tablet, Take 20 mg by mouth daily, Disp: , Rfl: 1    traZODone (DESYREL) 50 mg tablet, TAKE 1 TABLET BY MOUTH AT BEDTIME, MAY TAKE UP TO 2 TABLETS FOR SLEEP, Disp: , Rfl: 1    zolpidem (AMBIEN) 10 mg tablet, Take 10 mg by mouth daily, Disp: , Rfl: 1    CVS TUSSIN -10 MG/5ML oral liquid, TAKE 10 ML BY MOUTH DAILY AT BEDTIME AS NEEDED (COUGH), Disp: , Rfl: 0    Dextromethorphan-Guaifenesin (DELSYM COUGH/CHEST CONGEST DM) 5-100 MG/5ML LIQD, Take 10 mL by mouth daily at bedtime as needed (cough), Disp: 118 mL, Rfl: 0    docusate sodium (COLACE) 100 mg capsule, Take by mouth, Disp: , Rfl:     hydrocortisone 1 % cream, Apply topically 4 (four) times a day as needed for rash (itching), Disp: 30 g, Rfl: 0    loratadine (CLARITIN) 10 mg tablet, Take 1 tablet (10 mg total) by mouth daily, Disp: 30 tablet, Rfl: 2    Misc  Devices (SITZ BATH) MISC, by Does not apply route, Disp: , Rfl:         DO Vicki Garcia 73 Internal Medicine PGY-2    [facility]  [address]  [address] [facility], [address] [place] [address]  [phone]

## 2018-10-15 ENCOUNTER — HOSPITAL ENCOUNTER (EMERGENCY)
Facility: HOSPITAL | Age: 58
Discharge: HOME | End: 2018-10-15
Attending: EMERGENCY MEDICINE | Admitting: EMERGENCY MEDICINE
Payer: COMMERCIAL

## 2018-10-15 VITALS
WEIGHT: 174.16 LBS | SYSTOLIC BLOOD PRESSURE: 169 MMHG | HEART RATE: 114 BPM | DIASTOLIC BLOOD PRESSURE: 81 MMHG | RESPIRATION RATE: 18 BRPM | OXYGEN SATURATION: 98 % | TEMPERATURE: 98.3 F | BODY MASS INDEX: 31.85 KG/M2

## 2018-10-15 DIAGNOSIS — F25.9 SCHIZOAFFECTIVE DISORDER (HCC): Primary | ICD-10-CM

## 2018-10-15 LAB
AMPHETAMINES SERPL QL SCN: NEGATIVE
BARBITURATES UR QL: NEGATIVE
BENZODIAZ UR QL: NEGATIVE
COCAINE UR QL: NEGATIVE
ETHANOL SERPL-MCNC: <3 MG/DL (ref 0–3)
METHADONE UR QL: NEGATIVE
OPIATES UR QL SCN: NEGATIVE
PCP UR QL: NEGATIVE
THC UR QL: NEGATIVE

## 2018-10-15 PROCEDURE — 99284 EMERGENCY DEPT VISIT MOD MDM: CPT

## 2018-10-15 PROCEDURE — 80320 DRUG SCREEN QUANTALCOHOLS: CPT | Performed by: EMERGENCY MEDICINE

## 2018-10-15 PROCEDURE — 80307 DRUG TEST PRSMV CHEM ANLYZR: CPT | Performed by: EMERGENCY MEDICINE

## 2018-10-15 PROCEDURE — 36415 COLL VENOUS BLD VENIPUNCTURE: CPT | Performed by: EMERGENCY MEDICINE

## 2018-10-15 RX ORDER — PAROXETINE HYDROCHLORIDE 20 MG/1
20 TABLET, FILM COATED ORAL DAILY
Qty: 5 TABLET | Refills: 0 | Status: SHIPPED | OUTPATIENT
Start: 2018-10-15 | End: 2021-07-08

## 2018-10-15 RX ORDER — PAROXETINE HYDROCHLORIDE 20 MG/1
20 TABLET, FILM COATED ORAL DAILY
Qty: 5 TABLET | Refills: 0 | Status: SHIPPED | OUTPATIENT
Start: 2018-10-15 | End: 2018-10-15
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
## 2018-10-16 NOTE — ED NOTES
Pt complains of itching, painful rash on right cheek, right forearm and right side  MD aware       [place] RN  10/15/18 4986
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
## 2018-10-27 ENCOUNTER — HOSPITAL ENCOUNTER (EMERGENCY)
Facility: HOSPITAL | Age: 58
Discharge: HOME | End: 2018-10-27
Attending: EMERGENCY MEDICINE
Payer: COMMERCIAL

## 2018-10-27 VITALS
TEMPERATURE: 97.4 F | OXYGEN SATURATION: 97 % | RESPIRATION RATE: 18 BRPM | HEIGHT: 62 IN | BODY MASS INDEX: 32.05 KG/M2 | SYSTOLIC BLOOD PRESSURE: 156 MMHG | WEIGHT: 174.16 LBS | DIASTOLIC BLOOD PRESSURE: 70 MMHG | HEART RATE: 76 BPM

## 2018-10-27 DIAGNOSIS — F41.9 ANXIETY: Primary | ICD-10-CM

## 2018-10-27 DIAGNOSIS — F32.A DEPRESSION: ICD-10-CM

## 2018-10-27 LAB
AMPHETAMINES SERPL QL SCN: NEGATIVE
BARBITURATES UR QL: NEGATIVE
BENZODIAZ UR QL: NEGATIVE
COCAINE UR QL: NEGATIVE
ETHANOL EXG-MCNC: 0 MG/DL
METHADONE UR QL: NEGATIVE
OPIATES UR QL SCN: NEGATIVE
PCP UR QL: NEGATIVE
THC UR QL: NEGATIVE

## 2018-10-27 PROCEDURE — 80307 DRUG TEST PRSMV CHEM ANLYZR: CPT | Performed by: EMERGENCY MEDICINE

## 2018-10-27 PROCEDURE — 82075 ASSAY OF BREATH ETHANOL: CPT | Performed by: EMERGENCY MEDICINE

## 2018-10-27 PROCEDURE — 99284 EMERGENCY DEPT VISIT MOD MDM: CPT

## 2018-10-27 NOTE — DISCHARGE INSTRUCTIONS
Ansiedad   LO QUE NECESITA SABER:   La ansiedad es mc afección que provoca que usted se sienta extremadamente preocupado o nervioso  Los sentimientos son moreira krzysztof que pueden causar problemas en evelina actividades diarias o el sueño  La ansiedad puede desencadenarse por algo que teme o puede ocurrir sin mc causa  El estrés familiar o laboral, fumar, la cafeína y el alcohol pueden aumentar dang riesgo para sufrir ansiedad  Ciertos medicamentos o condiciones de [address] pueden aumentar dang riesgo  La ansiedad puede convertirse en mc afección de larga duración si no se controla ni se trata  INSTRUCCIONES SOBRE [facility]ITAL[facility]:   Llame al 911 si presenta:   · Usted tiene dolor de pecho, presión o pesadez que pueden llegar a propagarse a evelina hombros, brazos, mandíbula, marcel o espalda    · Usted siente deseos de lastimarse o lastimar a alguien más  Pregúntele a dang Paloma Guise vitaminas y minerales son adecuados para usted  · Evelina síntomas empeoran o no mejoran con el tratamiento  · Dang ansiedad viki que realice evelina actividades diarias  · Tiene nuevos síntomas desde dang última consulta  · Usted tiene preguntas o inquietudes acerca de dang condición o cuidado  Medicamentos:   · Medicamentos,  para ayudarle a sentirse más calmado y relajado y disminuir evelina síntomas  · [place] evelina medicamentos ramona se le haya indicado  Consulte con dang médico si usted rylie que dang medicamento no le está ayudando o si presenta efectos secundarios  Infórmele si es alérgico a algún medicamento  Mantenga mc lista actualizada de los [place], las vitaminas y los productos herbales que marcy  Incluya los siguientes datos de los medicamentos: cantidad, frecuencia y motivo de administración  Traiga con usted la lista o los envases de la píldoras a evelina citas de seguimiento  Lleve la lista de los medicamentos con usted en isaac de mc emergencia    Programe mc alicja con dang médico dentro de 2 semanas o ramona se le indique:  Anote lynnette preguntas para que se acuerde de hacerlas layla lynnette visitas  Maneje la ansiedad:   · Hable con alguien sobre dang ansiedad  Dang médico puede sugerirle que reciba consejería  La terapia cognitiva conceptual puede ayudarlo a entender y cambiar la forma que usted reacciona a los eventos que provocan lynnette síntomas  Usted podría sentirse más cómodo hablando con un familiar o amigo sobre dang ansiedad  Elija a alguien que usted sepa que será comprensivo y alentador  · Aprenda a relajarse  Las [place] ramona el ejercicio, meditación o escuchar música pueden ayudarlo a relajarse  Pase tiempo con amigos, o diego cosas que disfruta  · Practique la respiración profunda  La respiración profunda puede ayudarlo a relajarse cuando se sienta ansioso  Enfóquese en respirar lento y profundo varias veces al día, o layla un ataque de ansiedad  Respire por la nariz y exhale por la boca  · Dior mc rutina para dormir  El sueño regular puede ayudarlo a sentirse más tranquilo layla el día  Dawna [place] a dormirse y levántese a la misma hora todos los días  No eduardo televisión ni use el ordenador sissy antes de [place]  Dang habitación debe ser confortable, oscura y silenciosa  · Consuma alimentos saludables y variados  Los alimentos saludables incluyen frutas, verduras, productos lácteos bajos en grasa, yamel magras, pescado, panes integrales y frijoles cocidos  Los alimentos saludables pueden ayudarlo a sentir menos ansidedad y [employer]  · Realice actividad física con regularidad  El ejercicio puede ayudarlo a aumentar dang nivel de [place]  El ejercicio también puede elevar dang estado de ánimo y [place] a dormir mejor  Dang médico puede ayudarle a crear un plan de ejercicios  · No fume  La nicotina y otros químicos en los cigarrillos y cigarros pueden aumentar la ansiedad  Pida información a dang médico si usted actualmente fuma y necesita ayuda para dejar de fumar   Los cigarrillos electrónicos o tabaco sin humo todavía contienen nicotina  Consulte con cruz médico antes de [employer]  · No consuma cafeína  La cafeína puede empeorar evelina síntomas  No consuma alimentos o bebidas que tengan el propósito de aumentar cruz nivel de [place]  · Limite o no consuma bebidas alcohólicas  Pregúntele a cruz médico si usted puede carlie alcohol  Es posible que usted no pueda ingerir alcohol si marcy ciertos medicamentos para la ansiedad o la depresión  Limite el consumo de alcohol a 1 trago por día si es esha  Si usted es hombre, limite el consumo de alcohol a 2 tragos por día  Un trago equivale a 12 onzas de cerveza, 5 onzas de vino o 1 onza y ½ de licor  · No use drogas  Las drogas también pueden agravar la ansiedad  También pueden dificultar el manejo de la ansiedad  Hable con cruz médico si usted consume drogas y necesita ayuda para dejarlas  © 2017 [facility] Information is for End User's use only and may not be sold, redistributed or otherwise used for commercial purposes  All illustrations and images included in CareNotes® are the copyrighted property of A D A M , Inc  or Shahab Hawk  Esta información es sólo para uso en educación  Cruz intención no es darle un consejo médico sobre enfermedades o tratamientos  Colsulte con crzu Sissy Joe farmacéutico antes de seguir cualquier régimen médico para saber si es seguro y efectivo para usted  Depresión   LO QUE NECESITA SABER:   La depresión es un trastorno médico que causa sentimientos de tristeza o desesperanza que no desaparecen  La depresión puede causar que usted pierda interés en las cosas que antes disfrutaba  Estos sentimientos pueden llegar a interferir con cruz kalie diaria  INSTRUCCIONES SOBRE [facility]PITALARIA:   Llame al 911 en isaac de presentar lo siguiente:   · Usted piensa en lastimarse o lastimar a alguien más  Pregúntele a cruz Joe Bun vitaminas y minerales son adecuados para usted     · Evelina síntomas no mejoran  · No puede asistir a dang próxima alicja  · Usted tiene síntomas nuevos  · Usted tiene preguntas o inquietudes acerca de dang condición o cuidado  Medicamentos:   · Antidepresivos  pueden darse para mejorar o balancear dang estado de ánimo  Es posible que usted necesite carlie sawyer medicamento por varias semanas antes de empezar a sentirse mejor  · [place] lynnette medicamentos ramona se le haya indicado  Consulte con dang médico si usted rylie que dang medicamento no le está ayudando o si presenta efectos secundarios  Infórmele si es alérgico a algún medicamento  Mantenga mc lista actualizada de los [place], las vitaminas y los productos herbales que marcy  Incluya los siguientes datos de los medicamentos: cantidad, frecuencia y motivo de administración  Traiga con usted la lista o los envases de la píldoras a lynnette citas de seguimiento  Lleve la lista de los medicamentos con usted en isaac de mc emergencia  Terapia  podría usarse para tratar dang depresión  Un terapeuta le ayudará a aprender a lidiar con lynnette pensamientos y sentimientos  La terapia puede ser individual o grupal  También podría realizarse con lynnette familiares o con dang julieta  Cuidados personales:   · Realice actividad física regularmente  Trate de ejercitarse por 30 minutos, 3 a 5 días a la semana  Colabore con dang médico para crear un plan de ejercicios que usted disfrute  La actividad física puede llegar a mejorar lynnette síntomas  · Duerma lo suficiente  Invente mc rutina que le ayude a relajarse antes de irse a dormir  Puede escuchar música, leer o hacer yoga  Trate de irse a dormir y despertarse al mismo tiempo todos los radha  El sueño es importante para la ty emocional      · Consuma mc variedad de alimentos saludables de todos los grupos alimenticios  Un plan alimenticio saludable es bajo en grasas, sal y azúcar adicional  Solicite con dang médico más información sobre un plan de comidas que sea el adecuado para usted       · No tome alcohol ni use drogas  El alcohol y las drogas pueden empeorar lynnette síntomas  Acuda a lynnette consultas de control con dang médico según le indicaron  Dang médico vigilará dang progreso en las citas de seguimiento  También monitoreará dang medicamento si usted está tomando antidepresivos  Dang médico le preguntará si el [employer] está ayudando  Dígale sobre cualquier efecto secundario o problemas que usted tenga con dang medicamento  Podría ser necesario cambiar el tipo o cantidad de [employer]  Anote lynnette preguntas para que se acuerde de hacerlas layla lynnette visitas  © 2017 [address] Information is for End User's use only and may not be sold, redistributed or otherwise used for commercial purposes  All illustrations and images included in CareNotes® are the copyrighted property of A D A M , Inc  or Shahab Hawk  Esta información es sólo para uso en educación  Dang intención no es darle un consejo médico sobre enfermedades o tratamientos  Colsulte con dang [place] Chase farmacéutico antes de seguir cualquier régimen médico para saber si es seguro y efectivo para usted

## 2018-10-27 NOTE — ED NOTES
Pt is Guinean speaking only and everythign was translated by her daughter  Pt was brought in with her daughter because her mother has been experiencing increased stress between living with her daughter and her  with dementia  Pt denies SI/HI/AH/VH and report that because of her  she has not been taking her medications  She has an intake appointment with Leesa Foster on Nov 6th

## 2018-10-27 NOTE — ED ATTENDING ATTESTATION
Jax Alvarez MD, saw and evaluated the patient  I have discussed the patient with the resident/non-physician practitioner and agree with the resident's/non-physician practitioner's findings, Plan of Care, and MDM as documented in the resident's/non-physician practitioner's note, except where noted  All available labs and Radiology studies were reviewed  At this point I agree with the current assessment done in the Emergency Department  I have conducted an independent evaluation of this patient including a focused history of:    Emergency Department Note- Didi Minor 56 y o  female MRN: [id number]    Unit/Bed#: ED 13 Encounter: [id number]    Didi Minor is a 56 y o  female who presents with   Chief Complaint   Patient presents with    Anxiety     Per pt daughter, pt has been increasingly angry and anxious for the past week because of life stressors and not taking her medications  No SI/HI         History of Present Illness   HPI:  Didi Minor is a 56 y o  female who presents for evaluation of:  Anxiety and depression  Patient has felt more angry over the last week and has not been taking psychiatric medications  Patient has new psychiatrist Nov 6 is first appointment  Review of Systems   Psychiatric/Behavioral: Negative for self-injury and suicidal ideas  All other systems reviewed and are negative        Historical Information   Past Medical History:   Diagnosis Date    Anemia     Anxiety disorder     B[facility] disorder ([facility] [address] )     Psychiatric disorder      Past Surgical History:   Procedure Laterality Date     SECTION      , ,    [place] DENTAL SURGERY      TUBAL LIGATION       Social History   History   Alcohol Use No     History   Drug Use No     History   Smoking Status    Never Smoker   Smokeless Tobacco    Never Used     Family History: non-contributory    Meds/Allergies   all medications and allergies reviewed  No Known Allergies    Objective   First Vitals:   Blood Pressure: 156/70 (10/27/18 0046)  Pulse: 76 (10/27/18 0046)  Temperature: (!) 97 4 °F (36 3 °C) (10/27/18 0046)  Temp Source: Oral (10/27/18 0046)  Respirations: 18 (10/27/18 0046)  Height: 5' 2" (157 5 cm) (10/27/18 0046)  Weight - Scale: 79 kg (174 lb 2 6 oz) (10/27/18 0046)  SpO2: 97 % (10/27/18 0046)    Current Vitals:   Blood Pressure: 156/70 (10/27/18 0046)  Pulse: 76 (10/27/18 0046)  Temperature: (!) 97 4 °F (36 3 °C) (10/27/18 0046)  Temp Source: Oral (10/27/18 0046)  Respirations: 18 (10/27/18 0046)  Height: 5' 2" (157 5 cm) (10/27/18 0046)  Weight - Scale: 79 kg (174 lb 2 6 oz) (10/27/18 0046)  SpO2: 97 % (10/27/18 0046)    No intake or output data in the 24 hours ending 10/27/18 0212    Invasive Devices          No matching active lines, drains, or airways          Physical Exam   Constitutional: She is oriented to person, place, and time  She appears well-developed and well-nourished  Musculoskeletal: Normal range of motion  She exhibits no deformity  Neurological: She is alert and oriented to person, place, and time  Skin: Skin is warm and dry  Psychiatric: Judgment and thought content normal    Nursing note and vitals reviewed  Medical Decision Makin  Depression and anxiety: plan to have PCW see the patient  No results found for this or any previous visit (from the past 36 hour(s))  No orders to display         Portions of the record may have been created with voice recognition software  Occasional wrong word or "sound a like" substitutions may have occurred due to the inherent limitations of voice recognition software  Read the chart carefully and recognize, using context, where substitutions have occurred

## 2018-10-30 NOTE — ED PROVIDER NOTES
History  Chief Complaint   Patient presents with    Anxiety     Per pt daughter, pt has been increasingly angry and anxious for the past week because of life stressors and not taking her medications  No SI/HI     55 y/o female presents to the ED with her daughter for increased anxiety for the last few weeks  Patient is [place] speaking only and her daughter is translating  She states that patient's  was recently diagnosed with dementia and she can no longer take care of him  She states that now she has to live with her daughter, which has also been stressful  Her daughter reports that she has been more angry lately  Has not been taking her medications and has an appointment to see a new psychiatrist on Nov 6  She denies any SI/HI/ hallucations, drug use, or alcohol use  No other complaints  History provided by:  Patient and relative  Anxiety   Presenting symptoms: agitation and depression    Presenting symptoms: no suicidal thoughts, no suicidal threats and no suicide attempt    Patient accompanied by:  Family member  Degree of incapacity (severity): Moderate  Onset quality:  Sudden  Timing:  Constant  Progression:  Worsening  Chronicity:  Recurrent  Context: noncompliance and stressful life event    Context: not alcohol use and not drug abuse    Treatment compliance:  None of the time  Relieved by:  None tried  Worsened by:  Family interactions  Ineffective treatments:  None tried  Associated symptoms: anxiety and irritability    Associated symptoms: no abdominal pain, no chest pain, no headaches and no poor judgment    Risk factors: hx of mental illness    Risk factors: no recent psychiatric admission        Prior to Admission Medications   Prescriptions Last Dose Informant Patient Reported? Taking?    [employer] TUSSIN -10 MG/5ML oral liquid  Self Yes No   Sig: TAKE 10 ML BY MOUTH DAILY AT BEDTIME AS NEEDED (COUGH)   Dextromethorphan-Guaifenesin (DELSYM COUGH/CHEST CONGEST DM) 5-100 MG/5ML LIQD Self No No   Sig: Take 10 mL by mouth daily at bedtime as needed (cough)   Misc  Devices (SITZ BATH) MISC  Self Yes No   Sig: by Does not apply route   PARoxetine (PAXIL) 20 mg tablet   No No   Sig: Take 1 tablet (20 mg total) by mouth daily   cyclobenzaprine (FLEXERIL) 5 mg tablet  Self No No   Sig: Take 2 tablets (10 mg total) by mouth 3 (three) times a day as needed for muscle spasms   docusate sodium (COLACE) 100 mg capsule  Self Yes No   Sig: Take by mouth   fluticasone (FLONASE) 50 mcg/act nasal spray  Self Yes No   Si sprays into each nostril daily   hydrocortisone 1 % cream   No No   Sig: Apply topically 4 (four) times a day as needed for rash (itching)   loratadine (CLARITIN) 10 mg tablet   No No   Sig: Take 1 tablet (10 mg total) by mouth daily   naproxen (NAPROSYN) 500 mg tablet  Self No No   Sig: Take 1 tablet (500 mg total) by mouth every 12 (twelve) hours as needed for mild pain   omeprazole (PriLOSEC) 40 MG capsule  Self Yes No   Sig: Take 1 capsule by mouth daily   traZODone (DESYREL) 50 mg tablet  Self Yes No   Sig: TAKE 1 TABLET BY MOUTH AT BEDTIME, MAY TAKE UP TO 2 TABLETS FOR SLEEP   zolpidem (AMBIEN) 10 mg tablet  Self Yes No   Sig: Take 10 mg by mouth daily      Facility-Administered Medications: None       Past Medical History:   Diagnosis Date    Anemia     Anxiety disorder     Bipolar disorder (HCC)     Psychiatric disorder        Past Surgical History:   Procedure Laterality Date     SECTION      , ,     DENTAL SURGERY      TUBAL LIGATION         Family History   Problem Relation Age of Onset    Depression Mother     Diabetes Mother     Arthritis Father     Bipolar disorder Son    [facility] Anxiety disorder Son     Depression Son     Anxiety disorder Daughter     Bipolar disorder Daughter     Depression Daughter      I have reviewed and agree with the history as documented      Social History   Substance Use Topics    Smoking status: Never Smoker    Smokeless tobacco: Never Used    Alcohol use No        Review of Systems   Constitutional: Positive for irritability  Negative for chills and fever  HENT: Negative for congestion, ear pain and sore throat  Eyes: Negative for pain and visual disturbance  Respiratory: Negative for cough, shortness of breath and wheezing  Cardiovascular: Negative for chest pain and leg swelling  Gastrointestinal: Negative for abdominal pain, diarrhea, nausea and vomiting  Genitourinary: Negative for dysuria, frequency, hematuria and urgency  Musculoskeletal: Negative for neck pain and neck stiffness  Skin: Negative for rash and wound  Neurological: Negative for weakness, numbness and headaches  Psychiatric/Behavioral: Positive for agitation  Negative for confusion and suicidal ideas  The patient is nervous/anxious  All other systems reviewed and are negative  Physical Exam  ED Triage Vitals [10/27/18 0046]   Temperature Pulse Respirations Blood Pressure SpO2   (!) 97 4 °F (36 3 °C) 76 18 156/70 97 %      Temp Source Heart Rate Source Patient Position - Orthostatic VS BP Location FiO2 (%)   Oral Monitor Lying Right arm --      Pain Score       No Pain           Orthostatic Vital Signs  Vitals:    10/27/18 0046   BP: 156/70   Pulse: 76   Patient Position - Orthostatic VS: Lying       Physical Exam   Constitutional: She is oriented to person, place, and time  She appears well-developed and well-nourished  HENT:   Head: Normocephalic and atraumatic  Eyes: Pupils are equal, round, and reactive to light  EOM are normal    Neck: Normal range of motion  Neck supple  Cardiovascular: Normal rate and regular rhythm  Pulmonary/Chest: Effort normal and breath sounds normal    Abdominal: Soft  Bowel sounds are normal    Musculoskeletal: Normal range of motion  Neurological: She is alert and oriented to person, place, and time  No focal deficits   Skin: Skin is warm and dry     Psychiatric: She has a normal mood and affect  Her speech is normal and behavior is normal  Judgment and thought content normal  Cognition and memory are normal    Nursing note and vitals reviewed  ED Medications  Medications - No data to display    Diagnostic Studies  Results Reviewed     Procedure Component Value Units Date/Time    Rapid drug screen, urine [[id number]]  (Normal) Collected:  10/27/18 0252    Lab Status:  Final result Specimen:  Urine from Urine, Clean Catch Updated:  10/27/18 0318     Amph/Meth UR Negative     Barbiturate Ur Negative     Benzodiazepine Urine Negative     Cocaine Urine Negative     Methadone Urine Negative     Opiate Urine Negative     PCP Ur Negative     THC Urine Negative    Narrative:         FOR MEDICAL PURPOSES ONLY  IF CONFIRMATION NEEDED PLEASE CONTACT THE LAB WITHIN 5 DAYS  Drug Screen Cutoff Levels:  AMPHETAMINE/METHAMPHETAMINES  1000 ng/mL  BARBITURATES     200 ng/mL  BENZODIAZEPINES     200 ng/mL  COCAINE      300 ng/mL  METHADONE      300 ng/mL  OPIATES      300 ng/mL  PHENCYCLIDINE     25 ng/mL  THC       50 ng/mL    POCT alcohol breath test [[id number]]  (Normal) Resulted:  10/27/18 0242    Lab Status:  Final result Updated:  10/27/18 0242     EXTBreath Alcohol 0 000                 No orders to display         Procedures  Procedures      Phone Consults  ED Phone Contact    ED Course                               MDM  Number of Diagnoses or Management Options  Anxiety: new and requires workup  Depression: new and requires workup  Diagnosis management comments: Patient here for anxiety and depression- will have crisis evaluate patient  Patient reevaluated and feels improved  Discharge instructions given including follow-up, and return precautions  Patient demonstrates verbal understanding and agrees with plan         Amount and/or Complexity of Data Reviewed  Clinical lab tests: ordered and reviewed  Tests in the radiology section of CPT®: ordered and reviewed  Tests in the medicine section of CPT®: ordered and reviewed  Discussion of test results with the performing providers: yes  Decide to obtain previous medical records or to obtain history from someone other than the patient: yes  Obtain history from someone other than the patient: yes  Review and summarize past medical records: yes  Discuss the patient with other providers: yes  Independent visualization of images, tracings, or specimens: yes    Patient Progress  Patient progress: improved    CritCare Time    Disposition  Final diagnoses:   Anxiety   Depression     Time reflects when diagnosis was documented in both MDM as applicable and the Disposition within this note     Time User Action Codes Description Comment    10/27/2018  3:14 AM Jacqui PHILLIPS Add [F41 9] Anxiety     10/27/2018  3:14 AM Chas Corona Add [F32 9] Depression       ED Disposition     ED Disposition Condition Comment    Discharge  Manjula Gold discharge to home/self care  Condition at discharge: Good        MD Documentation      Most Recent Value   Sending MD DELGADO      Follow-up Information     Follow up With Specialties Details Why Contact Info Additional Information    Resources that were provided to you by the crisis worker  Call in 1 day For follow-up as soon as possible  [address] [place] [address] Emergency Department Emergency Medicine Go to Immediately for any new or worsening symptoms   [address] [place] [address]  [phone]  ED, [address] [place] [address], [phone]          Discharge Medication List as of 10/27/2018  3:15 AM      CONTINUE these medications which have NOT CHANGED    Details   [employer]S TUSSIN -10 MG/5ML oral liquid TAKE 10 ML BY MOUTH DAILY AT BEDTIME AS NEEDED (COUGH), Historical Med      cyclobenzaprine (FLEXERIL) 5 mg tablet Take 2 tablets (10 mg total) by mouth 3 (three) times a day as needed for muscle spasms, Starting Tue 5/22/2018, Normal      Dextromethorphan-Guaifenesin (DELSYM COUGH/CHEST CONGEST DM) 5-100 MG/5ML LIQD Take 10 mL by mouth daily at bedtime as needed (cough), Starting Thu 2/22/2018, Normal      docusate sodium (COLACE) 100 mg capsule Take by mouth, Starting Fri 6/12/2015, Historical Med      fluticasone (FLONASE) 50 mcg/act nasal spray 2 sprays into each nostril daily, Starting Fri 6/12/2015, Historical Med      hydrocortisone 1 % cream Apply topically 4 (four) times a day as needed for rash (itching), Starting Tue 6/19/2018, Normal      loratadine (CLARITIN) 10 mg tablet Take 1 tablet (10 mg total) by mouth daily, Starting Tue 6/19/2018, Normal      Misc  Devices (SITZ BATH) MISC by Does not apply route, Starting Fri 6/12/2015, Historical Med      naproxen (NAPROSYN) 500 mg tablet Take 1 tablet (500 mg total) by mouth every 12 (twelve) hours as needed for mild pain, Starting Tue 5/29/2018, Normal      omeprazole (PriLOSEC) 40 MG capsule Take 1 capsule by mouth daily, Starting Tue 1/13/2015, Historical Med      PARoxetine (PAXIL) 20 mg tablet Take 1 tablet (20 mg total) by mouth daily, Starting Mon 10/15/2018, Print      traZODone (DESYREL) 50 mg tablet TAKE 1 TABLET BY MOUTH AT BEDTIME, MAY TAKE UP TO 2 TABLETS FOR SLEEP, Historical Med      zolpidem (AMBIEN) 10 mg tablet Take 10 mg by mouth daily, Starting Sun 1/14/2018, Historical Med           No discharge procedures on file  ED Provider  Attending physically available and evaluated Scarlet Stafford I managed the patient along with the ED Attending      Electronically Signed by         Santo Salcido DO  10/29/18 2037

## 2018-11-05 ENCOUNTER — OFFICE VISIT (OUTPATIENT)
Dept: INTERNAL MEDICINE CLINIC | Facility: CLINIC | Age: 58
End: 2018-11-05
Payer: COMMERCIAL

## 2018-11-05 VITALS
WEIGHT: 171.52 LBS | SYSTOLIC BLOOD PRESSURE: 100 MMHG | BODY MASS INDEX: 31.56 KG/M2 | HEART RATE: 76 BPM | HEIGHT: 62 IN | TEMPERATURE: 98 F | DIASTOLIC BLOOD PRESSURE: 90 MMHG

## 2018-11-05 DIAGNOSIS — M54.9 BACK PAIN: Primary | ICD-10-CM

## 2018-11-05 PROCEDURE — 3008F BODY MASS INDEX DOCD: CPT | Performed by: INTERNAL MEDICINE

## 2018-11-05 PROCEDURE — 1036F TOBACCO NON-USER: CPT | Performed by: INTERNAL MEDICINE

## 2018-11-05 PROCEDURE — 99213 OFFICE O/P EST LOW 20 MIN: CPT | Performed by: INTERNAL MEDICINE

## 2018-11-05 RX ORDER — CHOLECALCIFEROL (VITAMIN D3) 50 MCG
CAPSULE ORAL
Refills: 2 | COMMUNITY
Start: 2018-08-15 | End: 2019-05-31

## 2018-11-05 RX ORDER — HYDROCORTISONE 0.5 %
CREAM (GRAM) TOPICAL
Qty: 57 G | Refills: 0 | Status: SHIPPED | OUTPATIENT
Start: 2018-11-05 | End: 2019-01-08

## 2018-11-05 RX ORDER — INFLUENZA VIRUS VACCINE 15; 15; 15; 15 UG/.5ML; UG/.5ML; UG/.5ML; UG/.5ML
SUSPENSION INTRAMUSCULAR
Refills: 0 | COMMUNITY
Start: 2018-09-15 | End: 2022-04-01 | Stop reason: ALTCHOICE

## 2018-11-05 RX ORDER — PAROXETINE HYDROCHLORIDE 40 MG/1
40 TABLET, FILM COATED ORAL DAILY
Refills: 0 | COMMUNITY
Start: 2018-10-22 | End: 2019-03-13

## 2018-11-05 RX ORDER — METHOCARBAMOL 500 MG/1
500 TABLET, FILM COATED ORAL 4 TIMES DAILY
Qty: 20 TABLET | Refills: 0 | Status: SHIPPED | OUTPATIENT
Start: 2018-11-05 | End: 2019-01-08

## 2019-01-04 RX ORDER — QUETIAPINE FUMARATE 100 MG/1
100 TABLET, FILM COATED ORAL
Refills: 0 | COMMUNITY
Start: 2018-11-29 | End: 2021-01-13

## 2019-01-08 ENCOUNTER — OFFICE VISIT (OUTPATIENT)
Dept: INTERNAL MEDICINE CLINIC | Facility: CLINIC | Age: 59
End: 2019-01-08

## 2019-01-08 VITALS
TEMPERATURE: 98.1 F | SYSTOLIC BLOOD PRESSURE: 112 MMHG | HEART RATE: 88 BPM | DIASTOLIC BLOOD PRESSURE: 90 MMHG | HEIGHT: 62 IN | WEIGHT: 183.86 LBS | BODY MASS INDEX: 33.84 KG/M2

## 2019-01-08 DIAGNOSIS — M54.9 BACK PAIN, UNSPECIFIED BACK LOCATION, UNSPECIFIED BACK PAIN LATERALITY, UNSPECIFIED CHRONICITY: Primary | ICD-10-CM

## 2019-01-08 PROCEDURE — 99215 OFFICE O/P EST HI 40 MIN: CPT | Performed by: INTERNAL MEDICINE

## 2019-01-08 RX ORDER — CYCLOBENZAPRINE HCL 10 MG
10 TABLET ORAL 3 TIMES DAILY PRN
Qty: 30 TABLET | Refills: 3 | Status: SHIPPED | OUTPATIENT
Start: 2019-01-08 | End: 2019-02-13 | Stop reason: SDUPTHER

## 2019-01-08 RX ORDER — HYDROCORTISONE 0.5 %
CREAM (GRAM) TOPICAL
Qty: 30 G | Refills: 0 | Status: SHIPPED | OUTPATIENT
Start: 2019-01-08 | End: 2020-09-30

## 2019-01-08 NOTE — PROGRESS NOTES
INTERNAL MEDICINE FOLLOW-UP OFFICE VISIT  [facility]  [address] [place] [address]    NAME: Trena Luis  AGE: 66 y o  SEX: female    DATE OF ENCOUNTER: 1/8/2019    Assessment and Plan     Back and neck pain:  Patient with 2 weeks of pain in lower C-spine as well as the lower T/upper L-spine  Without remote incident  Without history of IV drug use  Without red flag symptoms for back pain  Has a history of both neck and back pain described previously as musculoskeletal, with improvement with muscle relaxers  Suspect musculoskeletal origin of her pain at this time   -will represcribed Flexeril  -Kandice Footman  -heating pad  -Tylenol  -PT referral     Health Maintenance:   -colon cancer screening normal in 2014  Repeat in 2024   -breast cancer screening last mammogram in 2017, repeat in 2 years   -cervical cancer screening:  Up to date until 9/2019  -Will check lipid panel  No orders of the defined types were placed in this encounter  Chief Complaint     Chief Complaint   Patient presents with    Follow-up       History of Present Illness     HPI     61-year-old female with history of musculoskeletal pain in neck and low back, stress incontinence, depression presenting for routine checkup and for neck and back pain  She reports that she has had 2 weeks of pain in her lower C-spine midline  Pain is always present, worse with activity and use, better with rest sleep  She has tried naproxen and ibuprofen, neither of which have significantly helped her  She was previously on Flexeril and Robaxin, both of which did helped but she is not on these meds at this time  She also reports back pain over this period of time in the area of the lower T-spine/upper L-spine  Pain is midline  Without radiation  Also constant and worse with activity  Patient denies traumatic onset of this pain either  No IV drug use    She reports that the pain does not limit her ability to liver daily life  Denies neurologic symptoms  Denies red flag symptoms for back pain  Denies signs of systemic infection  The following portions of the patient's history were reviewed and updated as appropriate: allergies, current medications, past family history, past medical history, past social history, past surgical history and problem list     Review of Systems     Review of Systems   Constitutional: Negative for activity change, appetite change, chills, fatigue and fever  Respiratory: Negative for apnea, cough, choking, chest tightness, shortness of breath, wheezing and stridor  Cardiovascular: Negative for chest pain, palpitations and leg swelling  Gastrointestinal: Negative for abdominal distention, abdominal pain, anal bleeding, blood in stool, constipation, diarrhea, nausea, rectal pain and vomiting  Musculoskeletal: Positive for back pain and neck pain  Negative for arthralgias, gait problem, joint swelling and myalgias  Skin: Negative for color change, pallor, rash and wound  Neurological: Negative for dizziness, tremors, seizures, syncope, weakness, numbness and headaches  Active Problem List     Patient Active Problem List   Diagnosis    Encounter for hepatitis C screening test for low risk patient    Viral upper respiratory tract infection    Depression    Stress incontinence    Insomnia    Colon cancer screening    Breast cancer screening    Neuropathy    Cervical paraspinal muscle spasm    Elevated glucose    Back pain       Objective     /90 (BP Location: Right arm, Patient Position: Sitting, Cuff Size: Adult)   Pulse 88   Temp 98 1 °F (36 7 °C) (Oral)   Ht 5' 2" (1 575 m)   Wt 83 4 kg (183 lb 13 8 oz)   BMI 33 63 kg/m²     Physical Exam   Constitutional: She is oriented to person, place, and time  She appears well-developed and well-nourished  No distress  Neck: Normal range of motion  Neck supple  No JVD present   No tracheal deviation present  No thyromegaly present  Without obvious deformity or tenderness to palpation  Without step-offs or bony abnormalities  Cardiovascular: Normal rate, regular rhythm, normal heart sounds and intact distal pulses  Exam reveals no gallop and no friction rub  No murmur heard  Pulmonary/Chest: Effort normal and breath sounds normal  No stridor  No respiratory distress  She has no wheezes  She has no rales  She exhibits no tenderness  Abdominal: Soft  Bowel sounds are normal  She exhibits no distension and no mass  There is no tenderness  There is no rebound and no guarding  Musculoskeletal: Normal range of motion  She exhibits no edema, tenderness or deformity  Lower back without tenderness to palpation, step-offs, obvious deformities  Lymphadenopathy:     She has no cervical adenopathy  Neurological: She is alert and oriented to person, place, and time  She displays normal reflexes  She exhibits normal muscle tone  Coordination normal    Skin: Skin is warm and dry  No rash noted  She is not diaphoretic  No erythema  No pallor  Psychiatric: She has a normal mood and affect  Vitals reviewed        Pertinent Laboratory/Diagnostic Studies:  CBC:   Lab Results   Component Value Date/Time    WBC 7 76 06/19/2018 03:05 PM    RBC 4 45 06/19/2018 03:05 PM    HGB 12 6 06/19/2018 03:05 PM    HCT 40 0 06/19/2018 03:05 PM    MCV 90 06/19/2018 03:05 PM    MCH 28 3 06/19/2018 03:05 PM    MCHC 31 5 06/19/2018 03:05 PM    RDW 12 7 06/19/2018 03:05 PM    MPV 12 5 06/19/2018 03:05 PM     06/19/2018 03:05 PM    NRBC 0 06/19/2018 03:05 PM    NEUTOPHILPCT 70 06/19/2018 03:05 PM    LYMPHOPCT 21 06/19/2018 03:05 PM    MONOPCT 8 06/19/2018 03:05 PM    EOSPCT 1 06/19/2018 03:05 PM    BASOPCT 0 06/19/2018 03:05 PM    NEUTROABS 5 40 06/19/2018 03:05 PM    LYMPHSABS 1 61 06/19/2018 03:05 PM    MONOSABS 0 59 06/19/2018 03:05 PM    EOSABS 0 11 06/19/2018 03:05 PM     Chemistry Profile:   Lab Results   Component Value Date/Time    K 4 2 05/23/2018 08:18 AM     05/23/2018 08:18 AM    CO2 28 05/23/2018 08:18 AM    BUN 13 05/23/2018 08:18 AM    CREATININE 0 70 05/23/2018 08:18 AM    GLUC 102 01/21/2016 11:58 AM    GLUF 124 (H) 05/23/2018 08:18 AM    CALCIUM 9 0 05/23/2018 08:18 AM    AST 26 05/23/2018 08:18 AM    ALT 40 05/23/2018 08:18 AM    ALKPHOS 56 05/23/2018 08:18 AM    EGFR 98 05/23/2018 08:18 AM     CBC:     Chemistry Profile:       Invalid input(s): EXTSODIUM, EXTPOTASSIUM, EXTCO2, EXTANIONGAP, EXTBUN, EXTCREAT, EXTGLUBLD, GLU, SLAMBGLUCOSE, EXTCALCIUM, CAADJUST, ALBUMIN, SERUMALBUMIN, EXTEGFR, EGFRAA    Current Medications     Current Outpatient Prescriptions:     CVS TUSSIN -10 MG/5ML oral liquid, TAKE 10 ML BY MOUTH DAILY AT BEDTIME AS NEEDED (COUGH), Disp: , Rfl: 0    hydrocortisone 1 % cream, Apply topically 4 (four) times a day as needed for rash (itching), Disp: 30 g, Rfl: 0    loratadine (CLARITIN) 10 mg tablet, Take 1 tablet (10 mg total) by mouth daily, Disp: 30 tablet, Rfl: 2    naproxen (NAPROSYN) 500 mg tablet, Take 1 tablet (500 mg total) by mouth every 12 (twelve) hours as needed for mild pain, Disp: 60 tablet, Rfl: 0    PARoxetine (PAXIL) 20 mg tablet, Take 1 tablet (20 mg total) by mouth daily, Disp: 5 tablet, Rfl: 0    QUEtiapine (SEROquel) 100 mg tablet, Take 100 mg by mouth daily at bedtime, Disp: , Rfl: 0    traZODone (DESYREL) 50 mg tablet, TAKE 1 TABLET BY MOUTH AT BEDTIME, MAY TAKE UP TO 2 TABLETS FOR SLEEP, Disp: , Rfl: 1    CVS ALLERGY RELIEF 10 MG tablet, TAKE 1 TABLET BY MOUTH EVERY DAY, Disp: , Rfl: 2    Dextromethorphan-Guaifenesin (DELSYM COUGH/CHEST CONGEST DM) 5-100 MG/5ML LIQD, Take 10 mL by mouth daily at bedtime as needed (cough) (Patient not taking: Reported on 11/5/2018 ), Disp: 118 mL, Rfl: 0    FLUARIX QUADRIVALENT 0 5 ML IVÁN, Use as directed, Disp: , Rfl: 0    fluticasone (FLONASE) 50 mcg/act nasal spray, 2 sprays into each nostril daily, Disp: , Rfl:   Misc   Devices (SITZ BATH) MISC, by Does not apply route, Disp: , Rfl:     PARoxetine (PAXIL) 40 MG tablet, Take 40 mg by mouth daily, Disp: , Rfl: 0    Health Maintenance     Health Maintenance   Topic Date Due    CRC Screening: Colonoscopy  11/19/2024    DTaP,Tdap,and Td Vaccines (2 - Td) 08/30/2026    Hepatitis C Screening  Completed    INFLUENZA VACCINE  Completed     Immunization History   Administered Date(s) Administered    Influenza 11/02/2017, 09/15/2018    Influenza Quadrivalent Preservative Free 3 years and older IM 01/21/2016    Tdap 08/30/2016       Drake Colette  1/8/2019 2:47 PM

## 2019-01-08 NOTE — PATIENT INSTRUCTIONS
Dolor de espalda   CUIDADO AMBULATORIO:   Dolor en la espalda  es común  Usted puede estar adolorido o sentir rigidez en mayito o ambos lados de la espalda  El dolor se puede propagar a lynnette glúteos o muslos  El dolor de espalda puede ser causado al sufrir lesiones, por falta de [place], o la obesidad  Inclinarse, girar de un lado a otro de forma repetitiva o levantar objetos pesados también puede causar dolor de espalda  Busque atención médica inmediata para los siguientes síntomas:   · Dolor, entumecimiento o debilidad en mc o en ambas piernas    · Dolor que es tan intenso que lo incapacita para caminar    · Incapaz de controlar la orina o lynnette evacuaciones intestinales    · Ranjit dolor de espalda con dolor en el pecho    · Ranjit dolor de [place], [place] y vómito    · Dolor de espalda intenso que se propaga a un costado o al área genital  Pregúntele a dang Maxcine Mirian vitaminas y minerales son adecuados para usted  · Usted tiene dolor de espalda que no mejora con el reposo, ni con el medicamento para el dolor  · Usted tiene fiebre  · Usted tiene un dolor que empeora cuando está acostado boca Heriberto Ordonez o al descansar  · Usted tiene dolor que empeora cuando tose o estornuda  · Usted pierde peso sin proponérselo  · Usted tiene preguntas o inquietudes acerca de dang condición o cuidado  Tratamiento para el dolor de espalda  podría incluir cualquiera de los siguientes:  · AINEs (Analgésicos antiinflamatorios no esteroides) ramona el ibuprofeno, ayudan a disminuir la inflamación, el dolor y la fiebre  [place] medicamento esta disponible con o sin mc receta médica  Los AINEs pueden causar sangrado estomacal o problemas renales en ciertas personas  Si usted marcy un medicamento anticoagulante, siempre pregúntele a dang médico si los PIOTR son seguros para usted  Siempre tristan la etiqueta de sawyer medicamento y [place] instrucciones  · El acetaminofén  [employer]  Está disponible sin receta médica  Pregunte la cantidad y la frecuencia con que debe tomarlos  [address]  El acetaminofén puede causar daño en el hígado cuando no se marcy de forma correcta  · Un medicamento con receta para el dolor  podrían ser Hershal Simmer  Pregunte al médico cómo debe carlie sawyer medicamento de forma yang  Controlando cruz dolor de espalda:   · Aplique hielo  en la espalda de 15 a 20 minutos cada hora o ramona se le indique  Use un paquete de hielo o ponga hielo molido dentro de [employer]  Cúbrala con mc toalla  El hielo ayuda a disminuir la inflamación y el dolor  · La aplicación de calor  en la espalda de 20 a 30 minutos cada 2 horas por los [employer] indiquen  El calor ayuda a disminuir el dolor y los espasmos musculares  Usted puede alternar entre el hielo y calor  · Manténgase activo  lo más que pueda sin causar [employer]  El reposo en cama puede empeorar cruz dolor de espalda  Evite levantar objetos hasta que ya no tenga dolor  Acuda a lynnette consultas de control con cruz médico según le indicaron  Anote lynnette preguntas para que se acuerde de hacerlas layla lynnette visitas  © 2017 [address] Information is for End User's use only and may not be sold, redistributed or otherwise used for commercial purposes  All illustrations and images included in CareNotes® are the copyrighted property of A D A M , Inc  or Shahab Hawk  Esta información es sólo para uso en educación  Curz intención no es darle un consejo médico sobre enfermedades o tratamientos  Colsulte con cruz [place] farmacéutico antes de seguir cualquier régimen médico para saber si es seguro y efectivo para usted

## 2019-01-17 ENCOUNTER — EVALUATION (OUTPATIENT)
Dept: PHYSICAL THERAPY | Age: 59
End: 2019-01-17
Payer: COMMERCIAL

## 2019-01-17 DIAGNOSIS — M54.9 BACK PAIN, UNSPECIFIED BACK LOCATION, UNSPECIFIED BACK PAIN LATERALITY, UNSPECIFIED CHRONICITY: ICD-10-CM

## 2019-01-17 PROCEDURE — G8991 OTHER PT/OT GOAL STATUS: HCPCS | Performed by: PHYSICAL THERAPIST

## 2019-01-17 PROCEDURE — 97162 PT EVAL MOD COMPLEX 30 MIN: CPT | Performed by: PHYSICAL THERAPIST

## 2019-01-17 PROCEDURE — 97110 THERAPEUTIC EXERCISES: CPT | Performed by: PHYSICAL THERAPIST

## 2019-01-17 PROCEDURE — G8990 OTHER PT/OT CURRENT STATUS: HCPCS | Performed by: PHYSICAL THERAPIST

## 2019-01-17 NOTE — PROGRESS NOTES
PT Evaluation     Today's date: 2019  Patient name: Wes Cash  : 1960  MRN: [id number]  Referring provider: Spencer Sequeira MD  Dx:   Encounter Diagnosis     ICD-10-CM    1  Back pain, unspecified back location, unspecified back pain laterality, unspecified chronicity M54 9 Ambulatory referral to Physical Therapy                  Assessment  Assessment details: 66year old female patient reports to PT with acute low back pain  Patient has no distal symptoms  Patient's primary movement impairment seems to be decreased coordination of her core musculature and poor body mechanics with lifting  Patient also has decreased proximal hip strength B, which could be contributing to patient's symptoms  Patient will benefit from skilled PT services to address current impairments and functional limitations to help patient return to her PLOF  Impairments: abnormal or restricted ROM, abnormal movement, activity intolerance, impaired physical strength, pain with function and poor body mechanics    Symptom irritability: moderateUnderstanding of Dx/Px/POC: good   Prognosis: good    Goals  STG  1  Patient will be independent with completion of HEP throughout therapy  2  Patient will have at worst 6/10 pain so patient can stand for more prolonged periods so patient can complete more household tasks in 2 weeks  LTG  1  Patient will lift with proper body mechanics in 4 weeks  2  Patient will ambulate for at least 30 minutes without increased symptoms so patient can navigate throughout the community with less difficulty in 4 weeks      Plan  Patient would benefit from: skilled physical therapy  Planned therapy interventions: abdominal trunk stabilization, body mechanics training, joint mobilization, manual therapy, neuromuscular re-education, patient education, strengthening, stretching, therapeutic activities, therapeutic exercise, home exercise program, functional ROM exercises and flexibility  Frequency: 2x week  Duration in weeks: 4  Treatment plan discussed with: patient        Subjective Evaluation    History of Present Illness  Mechanism of injury: Patient reports with acute low back pain that began when patient was lifting heavier objects  Patient denies numbness/tingling, progressive weakness, saddle anesthesia, bowel/bladder issues  Patient has some trouble standing, lifting, and walking due to her symptoms  Patient doesn't currently work  Pain  Current pain ratin  At best pain ratin  At worst pain ratin  Quality: dull ache  Aggravating factors: walking, standing and lifting    Treatments  Current treatment: physical therapy  Patient Goals  Patient goals for therapy: decreased pain          Objective     Special Questions  Positive for disturbed sleep  Negative for night pain, bladder dysfunction, bowel dysfunction and saddle (S4) numbness    Neurological Testing     Sensation     Lumbar   Left   Intact: light touch    Right   Intact: light touch    Active Range of Motion     Lumbar   Flexion: WFL  Extension: 25 degrees     Strength/Myotome Testing     Left Hip   Planes of Motion   Flexion: 4  Extension: 3+  Abduction: 3+    Right Hip   Planes of Motion   Flexion: 4  Extension: 3+  Abduction: 3+    Left Knee   Flexion: 4  Extension: 4    Right Knee   Flexion: 4  Extension: 4    Left Ankle/Foot   Dorsiflexion: 4  Great toe extension: 4    Right Ankle/Foot   Dorsiflexion: 4  Great toe extension: 4    Tests     Lumbar     Left   Negative crossed SLR, femoral stretch and passive SLR  Right   Negative crossed SLR, femoral stretch and passive SLR         Flowsheet Rows      Most Recent Value   PT/OT G-Codes   Current Score  56   Projected Score  68   FOTO information reviewed  Yes   Assessment Type  Evaluation   G code set  Other PT/OT Primary   Other PT Primary Current Status ()  CK   Other PT Primary Goal Status ()  CJ          Precautions: primary Irish speaking, understands some english    Daily Treatment Diary     Manual              Supine hip flexor stretch B                                                                     Exercise Diary  1/17            Slouch over correct HEP            Ab marching             bridges             Supine piriformis stretch              Recumbent bike             quadruped             Standing anti-cable rotations              Box lifts                                                                                                                                                                                                        Modalities

## 2019-01-21 ENCOUNTER — APPOINTMENT (OUTPATIENT)
Dept: PHYSICAL THERAPY | Age: 59
End: 2019-01-21
Payer: COMMERCIAL

## 2019-01-31 ENCOUNTER — APPOINTMENT (OUTPATIENT)
Dept: PHYSICAL THERAPY | Age: 59
End: 2019-01-31
Payer: COMMERCIAL

## 2019-02-13 DIAGNOSIS — M54.9 BACK PAIN, UNSPECIFIED BACK LOCATION, UNSPECIFIED BACK PAIN LATERALITY, UNSPECIFIED CHRONICITY: ICD-10-CM

## 2019-02-13 RX ORDER — CYCLOBENZAPRINE HCL 10 MG
10 TABLET ORAL 3 TIMES DAILY PRN
Qty: 30 TABLET | Refills: 3 | Status: SHIPPED | OUTPATIENT
Start: 2019-02-13 | End: 2019-03-07 | Stop reason: SDUPTHER

## 2019-03-07 DIAGNOSIS — M54.9 BACK PAIN, UNSPECIFIED BACK LOCATION, UNSPECIFIED BACK PAIN LATERALITY, UNSPECIFIED CHRONICITY: ICD-10-CM

## 2019-03-07 RX ORDER — CYCLOBENZAPRINE HCL 10 MG
10 TABLET ORAL 3 TIMES DAILY PRN
Qty: 30 TABLET | Refills: 0 | Status: SHIPPED | OUTPATIENT
Start: 2019-03-07 | End: 2019-08-05

## 2019-03-13 ENCOUNTER — OFFICE VISIT (OUTPATIENT)
Dept: OBGYN CLINIC | Facility: CLINIC | Age: 59
End: 2019-03-13

## 2019-03-13 VITALS
HEART RATE: 102 BPM | HEIGHT: 60 IN | DIASTOLIC BLOOD PRESSURE: 73 MMHG | BODY MASS INDEX: 37.5 KG/M2 | WEIGHT: 191 LBS | SYSTOLIC BLOOD PRESSURE: 134 MMHG

## 2019-03-13 DIAGNOSIS — Z00.00 ENCOUNTER FOR ANNUAL PHYSICAL EXAMINATION EXCLUDING GYNECOLOGICAL EXAMINATION IN A PATIENT OLDER THAN 17 YEARS: ICD-10-CM

## 2019-03-13 DIAGNOSIS — Z01.419 ENCOUNTER FOR GYNECOLOGICAL EXAMINATION WITH PAPANICOLAOU SMEAR OF CERVIX: Primary | ICD-10-CM

## 2019-03-13 PROCEDURE — G0145 SCR C/V CYTO,THINLAYER,RESCR: HCPCS | Performed by: OBSTETRICS & GYNECOLOGY

## 2019-03-13 PROCEDURE — 3725F SCREEN DEPRESSION PERFORMED: CPT | Performed by: OBSTETRICS & GYNECOLOGY

## 2019-03-13 PROCEDURE — 99386 PREV VISIT NEW AGE 40-64: CPT | Performed by: OBSTETRICS & GYNECOLOGY

## 2019-03-13 PROCEDURE — 87624 HPV HI-RISK TYP POOLED RSLT: CPT | Performed by: OBSTETRICS & GYNECOLOGY

## 2019-03-14 PROBLEM — Z11.59 ENCOUNTER FOR HEPATITIS C SCREENING TEST FOR LOW RISK PATIENT: Status: RESOLVED | Noted: 2018-02-22 | Resolved: 2019-03-14

## 2019-03-14 PROBLEM — J06.9 VIRAL UPPER RESPIRATORY TRACT INFECTION: Status: RESOLVED | Noted: 2018-02-22 | Resolved: 2019-03-14

## 2019-03-14 PROBLEM — Z00.00 ENCOUNTER FOR ANNUAL PHYSICAL EXAMINATION EXCLUDING GYNECOLOGICAL EXAMINATION IN A PATIENT OLDER THAN 17 YEARS: Status: ACTIVE | Noted: 2019-03-14

## 2019-03-14 PROBLEM — N39.3 STRESS INCONTINENCE: Status: RESOLVED | Noted: 2018-02-22 | Resolved: 2019-03-14

## 2019-03-14 LAB
HPV HR 12 DNA CVX QL NAA+PROBE: NEGATIVE
HPV16 DNA CVX QL NAA+PROBE: NEGATIVE
HPV18 DNA CVX QL NAA+PROBE: NEGATIVE

## 2019-03-14 NOTE — PROGRESS NOTES
Assessment & Plan  66 y o  [id number] postmenopausal not sexually active, hx of btl, here for annual gyn examination  Counseled if any episodes of postmenopausal bleeding to follow-up with her gyn  Discussed healthy eating and exercise  F/u mammogram & pap w/ cotesting  RTC PRN  Problem List Items Addressed This Visit        Other    Encounter for annual physical examination excluding gynecological examination in a patient older than 17 years      Other Visit Diagnoses     Encounter for gynecological examination with Papanicolaou smear of cervix    -  Primary    Relevant Orders    Liquid-based pap, screening    Mammo diagnostic bilateral w 3d & cad    HPV High Risk        ____________________________________________________________  Subjective  No complaints  Menopausal since over one year ago  Denies any PMB  Denies any medical history other than depression which she follows with a physician with  Denies any thoughts of sadness or harm to self or others    Denies any breast or vaginal complaints      3 prior 's  In past had regular menses qmonthly  Not currently sexually active    Homemaker  Walks about 20-30minutes 2-3 times a week  Denies smoking or drugs or alcohol    Has had a colonoscopy last year, follows with her PCP    Denies family history of breast or other gyn malignancies  Denies history of abnormal pap tests  Denies history of abnormal mammogram (last was two years ago)    Objective  /73 (BP Location: Left arm, Patient Position: Sitting, Cuff Size: Standard)   Pulse 102   Ht 4' 11 84" (1 52 m)   Wt 86 6 kg (191 lb)   LMP  (LMP Unknown)   BMI 37 50 kg/m²     Patient's Active Problem List  Patient Active Problem List   Diagnosis    Depression    Insomnia    Colon cancer screening    Breast cancer screening    Neuropathy    Cervical paraspinal muscle spasm    Elevated glucose    Back pain    Encounter for annual physical examination excluding gynecological examination in a patient older than 17 years     Review of Systems   All other systems reviewed and are negative  Physical Exam   Constitutional: She is oriented to person, place, and time  She appears well-developed and well-nourished  No distress  Genitourinary:   Genitourinary Comments: SSE: normal appearing cervix, vaginal walls, no lesions  Bimanual examination: no CMT, no adnexal tenderness   HENT:   Head: Normocephalic and atraumatic  Cardiovascular: Normal rate and regular rhythm  Exam reveals no gallop and no friction rub  No murmur heard  Pulmonary/Chest: Effort normal and breath sounds normal  No stridor  No respiratory distress  She has no wheezes  Abdominal: Soft  She exhibits no distension and no mass  There is no tenderness  There is no guarding  Neurological: She is alert and oriented to person, place, and time  Skin: Skin is warm and dry  She is not diaphoretic

## 2019-03-16 LAB
LAB AP GYN PRIMARY INTERPRETATION: NORMAL
Lab: NORMAL

## 2019-03-30 DIAGNOSIS — M54.9 BACK PAIN, UNSPECIFIED BACK LOCATION, UNSPECIFIED BACK PAIN LATERALITY, UNSPECIFIED CHRONICITY: ICD-10-CM

## 2019-04-01 RX ORDER — CYCLOBENZAPRINE HCL 10 MG
10 TABLET ORAL 3 TIMES DAILY PRN
Qty: 30 TABLET | Refills: 3 | OUTPATIENT
Start: 2019-04-01

## 2019-04-16 ENCOUNTER — TELEPHONE (OUTPATIENT)
Dept: INTERNAL MEDICINE CLINIC | Facility: CLINIC | Age: 59
End: 2019-04-16

## 2019-05-10 ENCOUNTER — TRANSCRIBE ORDERS (OUTPATIENT)
Dept: LAB | Facility: HOSPITAL | Age: 59
End: 2019-05-10

## 2019-05-10 ENCOUNTER — APPOINTMENT (OUTPATIENT)
Dept: LAB | Facility: HOSPITAL | Age: 59
End: 2019-05-10
Payer: COMMERCIAL

## 2019-05-10 DIAGNOSIS — M54.9 BACK PAIN, UNSPECIFIED BACK LOCATION, UNSPECIFIED BACK PAIN LATERALITY, UNSPECIFIED CHRONICITY: ICD-10-CM

## 2019-05-10 LAB
CHOLEST SERPL-MCNC: 165 MG/DL (ref 50–200)
HDLC SERPL-MCNC: 50 MG/DL (ref 40–60)
LDLC SERPL CALC-MCNC: 92 MG/DL (ref 0–100)
NONHDLC SERPL-MCNC: 115 MG/DL
TRIGL SERPL-MCNC: 116 MG/DL

## 2019-05-10 PROCEDURE — 80061 LIPID PANEL: CPT

## 2019-05-10 PROCEDURE — 36415 COLL VENOUS BLD VENIPUNCTURE: CPT

## 2019-05-13 ENCOUNTER — TELEPHONE (OUTPATIENT)
Dept: OBGYN CLINIC | Facility: CLINIC | Age: 59
End: 2019-05-13

## 2019-05-13 NOTE — DISCHARGE INSTRUCTIONS
Trastorno esquizoafectivo   LO QUE NECESITA SABER:   El trastorno esquizoafectivo es mc enfermedad mental a kimberly plazo que podría cambiar la manera en que usted piensa, se siente y se comporta alrededor de los demás  Es posible que no pueda distinguir entre las cosas que son reales y las que no lo son  INSTRUCCIONES SOBRE EL JOAN HOSPITALARIA:   Medicamentos:   · Antipsicóticos:  Morene Flock a aliviar los síntomas psicóticos o la agitación grave  Es posible que necesite carlie medicación contra el mal de Parkinson para controlar la rigidez, los espasmos musculares y la agitación causados por los medicamentos antipsicóticos  · Medicamento contra la ansiedad:  Estos medicamentos pueden ser administrados para reducir ansiedad y ayudar a que usted se sienta más calmado y relajado  · Antidepresivos:  Estos medicamentos contribuyen a aliviar o [employer] de la depresión, ansiedad y [place]  · Estabilizadores del Kd de ánimo:  Estos medicamentos ayudan a controlar los Ariel de humor  · Anticonvulsivos:  Se administra sawyer medicamento para controlar las convulsiones  Es posible que también se usen para disminuir los comportamientos violentos y [employer] cambios de humor  · Medicamentos para la presión arterial:  Es posible que se empleen para disminuir los tics motrices (movimientos involuntarios)  También es posible que le ayuden a sentirse más tranquilo, menos irritable y concentrarse mejor  · Anticolinérgicos:  Estos medicamentos [employer] secundarios de otros medicamentos  · [place] lynnette medicamentos ramona se le haya indicado  Consulte con dang médico si usted rylie que dang medicamento no le está ayudando o si presenta efectos secundarios  Infórmele si es alérgico a algún medicamento  Mantenga mc lista actualizada de los [place], las vitaminas y los productos herbales que marcy   Incluya los siguientes datos de los medicamentos: cantidad, frecuencia y [place] de administración  Traiga con usted la lista o los envases de la píldoras a evelina citas de seguimiento  Lleve la lista de los medicamentos con usted en isaac de mc emergencia  El [place] de dang síntomas:  Lo siguiente podría ayudarle a sentirse mejor o impedir que regresen los síntomas del trastorno esquizoafectivo:  · Obtenga [de-identified] para usted y dang petrona:  Hable con otras personas ramona ayuda para sobrellevar mejor dang enfermedad  Es posible que esto [employer] ayude a relacionarse mejor con los demás  · Asista a todas las citas médicas:  [place] le ayudará a controlar dang enfermedad y los efectos secundarios de los medicamentos que puede estar tomando  · [place] evelina medicamentos ramona le indiquen:  Ponga evelina medicamentos en un pastillero y colóquelo en un lugar a la vista  Use un reloj con alarma ramona ayuda para no olvidarse de carlie evelina medicamentos  Informe a dang médico si sabe o piensa que está embarazada  No deje de carlie los medicamentos sin la autorización de dang médico  Podría experimentar problemas graves si dejar de carlie los medicamentos de forma repentina  · Esté alerta a posibles signos de mc recaída y busque ayuda de inmediato si:      ¨ [place] la manera en que piensa, siente y percibe las cosas  ¨ Se comporta de [employer] de [facility]  ¨ Se siente más nervioso y [place] no sabe por qué motivo  ¨ Come menos o tiene dificultad para dormir  ¨ Tiene poco o ningún interés en evelina amistades o actividades  Comuníquese con dang médico o psiquiatra si:   · Dior que está teniendo [place] recaída  · Tiene efectos secundarios de dang medicación o ésta no lo está ayundando  · No duerme jamila o duerme más de lo acostumbrado  · Usted no puede comer o está comiendo más de lo acostumbrado  · Usted tiene espasmos musculares, rigidez o dificultad para caminar  · Evelina sentimientos y pensamientos tristes cambian la forma en que opera layla el día      · Usted tiene preguntas o inquietudes acerca de cruz condición o cuidado  Regrese a la demetri de emergencias si:   · Siente deseos de lastimar o matar a otras personas o a usted mismo  · Usted siente que cruz condición está empeorando  · Está muy enojado, ha amenazado a [place] persona o se siente violento  · Tiene cambios repentinos en cruz visión  · Tiene dolor en el pecho, dificultad para respirar o fiebre de forma repentina  © 2017 [address] [employer] Information is for End User's use only and may not be sold, redistributed or otherwise used for commercial purposes  All illustrations and images included in CareNotes® are the copyrighted property of A D A M , Inc  or Shahab Hawk  Esta información es sólo para uso en educación  Cruz intención no es darle un consejo médico sobre enfermedades o tratamientos  Colsulte con cruz [place] Chase farmacéutico antes de seguir cualquier régimen médico para saber si es seguro y efectivo para usted  135.52

## 2019-05-30 RX ORDER — ZOLPIDEM TARTRATE 5 MG/1
TABLET ORAL
Refills: 1 | COMMUNITY
Start: 2019-05-01 | End: 2021-01-13

## 2019-05-31 ENCOUNTER — APPOINTMENT (OUTPATIENT)
Dept: LAB | Facility: CLINIC | Age: 59
End: 2019-05-31
Payer: COMMERCIAL

## 2019-05-31 ENCOUNTER — OFFICE VISIT (OUTPATIENT)
Dept: INTERNAL MEDICINE CLINIC | Facility: CLINIC | Age: 59
End: 2019-05-31

## 2019-05-31 VITALS
SYSTOLIC BLOOD PRESSURE: 128 MMHG | BODY MASS INDEX: 39.47 KG/M2 | TEMPERATURE: 97.9 F | HEART RATE: 100 BPM | HEIGHT: 59 IN | DIASTOLIC BLOOD PRESSURE: 88 MMHG | WEIGHT: 195.77 LBS

## 2019-05-31 DIAGNOSIS — Z12.31 SCREENING MAMMOGRAM, ENCOUNTER FOR: ICD-10-CM

## 2019-05-31 DIAGNOSIS — M54.9 BACK PAIN: Primary | ICD-10-CM

## 2019-05-31 LAB
EST. AVERAGE GLUCOSE BLD GHB EST-MCNC: 128 MG/DL
HBA1C MFR BLD: 6.1 % (ref 4.2–6.3)

## 2019-05-31 PROCEDURE — 36415 COLL VENOUS BLD VENIPUNCTURE: CPT

## 2019-05-31 PROCEDURE — 83036 HEMOGLOBIN GLYCOSYLATED A1C: CPT

## 2019-05-31 PROCEDURE — 99213 OFFICE O/P EST LOW 20 MIN: CPT | Performed by: INTERNAL MEDICINE

## 2019-06-12 ENCOUNTER — HOSPITAL ENCOUNTER (OUTPATIENT)
Dept: RADIOLOGY | Facility: HOSPITAL | Age: 59
Discharge: HOME | End: 2019-06-12
Payer: COMMERCIAL

## 2019-06-12 VITALS — BODY MASS INDEX: 39.31 KG/M2 | HEIGHT: 59 IN | WEIGHT: 195 LBS

## 2019-06-12 DIAGNOSIS — Z12.31 SCREENING MAMMOGRAM, ENCOUNTER FOR: ICD-10-CM

## 2019-06-12 PROCEDURE — 77067 SCR MAMMO BI INCL CAD: CPT

## 2019-08-05 ENCOUNTER — OFFICE VISIT (OUTPATIENT)
Dept: INTERNAL MEDICINE CLINIC | Facility: CLINIC | Age: 59
End: 2019-08-05

## 2019-08-05 VITALS
DIASTOLIC BLOOD PRESSURE: 80 MMHG | SYSTOLIC BLOOD PRESSURE: 128 MMHG | BODY MASS INDEX: 40.62 KG/M2 | HEART RATE: 100 BPM | HEIGHT: 59 IN | TEMPERATURE: 98 F | WEIGHT: 201.5 LBS

## 2019-08-05 DIAGNOSIS — R73.03 PRE-DIABETES: Primary | ICD-10-CM

## 2019-08-05 PROCEDURE — 99213 OFFICE O/P EST LOW 20 MIN: CPT | Performed by: INTERNAL MEDICINE

## 2019-08-05 PROCEDURE — 3008F BODY MASS INDEX DOCD: CPT | Performed by: INTERNAL MEDICINE

## 2019-08-05 PROCEDURE — 1036F TOBACCO NON-USER: CPT | Performed by: INTERNAL MEDICINE

## 2019-08-05 NOTE — PROGRESS NOTES
INTERNAL MEDICINE FOLLOW-UP OFFICE VISIT  [facility]  [address] [facility], [address]    NAME: Bebe Randle  AGE: 66 y o  SEX: female    DATE OF ENCOUNTER: 8/5/2019    Assessment and Plan       Pre Diabetes: a1c 6 1  Patient diet is poor and is high in sugar    -Discussed dietary compliance extensively  Will continue to assess at future visits  Back pain: MSK in nature  Improving significantly since last visit  Has been addressed multiple times in the recent past  Has not seen PT despite multiple times being advised to do so  Reports compliance with Bengay and heating pad with occasional use of tylenol  Without red flag signs for back pain    -Advised patient to continue Bengay/heating pad/tylenol  -Will re-refer to PT       Health maintenance  -CRC up to date until 2024, Mammogram performed 6/19  Cervical screening up to date last performed with cotesting 3/19    -A1C 6 1 5/19  -Lipid panel ordered 2019  No orders of the defined types were placed in this encounter       - Counseling Documentation: patient was counseled regarding: diagnostic results, instructions for management, risk factor reductions and importance of compliance with treatment    Chief Complaint     Chief Complaint   Patient presents with    Physical Exam    Follow-up       History of Present Illness     HPI    The following portions of the patient's history were reviewed and updated as appropriate: allergies, current medications, past family history, past medical history, past social history, past surgical history and problem list     Review of Systems     Review of Systems   Constitutional: Negative for activity change, appetite change, chills, diaphoresis, fatigue, fever and unexpected weight change  Respiratory: Negative for apnea, cough, choking, chest tightness, shortness of breath, wheezing and stridor  Cardiovascular: Negative for chest pain, palpitations and leg swelling  Gastrointestinal: Negative for abdominal distention, abdominal pain, anal bleeding, blood in stool, constipation, diarrhea, nausea, rectal pain and vomiting  Endocrine: Negative for cold intolerance, heat intolerance, polydipsia, polyphagia and polyuria  Genitourinary: Negative for difficulty urinating, dyspareunia, dysuria, enuresis and urgency  Musculoskeletal: Negative for arthralgias, back pain, gait problem, joint swelling, myalgias, neck pain and neck stiffness  Skin: Negative for color change, pallor, rash and wound  Neurological: Negative for dizziness, tremors, seizures, syncope, facial asymmetry, speech difficulty, weakness, light-headedness, numbness and headaches  Hematological: Negative for adenopathy  Does not bruise/bleed easily  Active Problem List     Patient Active Problem List   Diagnosis    Depression    Insomnia    Colon cancer screening    Breast cancer screening    Neuropathy    Cervical paraspinal muscle spasm    Elevated glucose    Back pain    Encounter for annual physical examination excluding gynecological examination in a patient older than 17 years       Objective     /80 (BP Location: Right arm, Patient Position: Sitting, Cuff Size: Adult)   Pulse 100   Temp 98 °F (36 7 °C) (Oral)   Ht 4' 11" (1 499 m)   Wt 91 4 kg (201 lb 8 oz)   LMP  (LMP Unknown)   BMI 40 70 kg/m²     Physical Exam   Constitutional: She is oriented to person, place, and time  She appears well-developed and well-nourished  No distress  HENT:   Head: Normocephalic and atraumatic  Right Ear: External ear normal    Left Ear: External ear normal    Nose: Nose normal    Mouth/Throat: Oropharynx is clear and moist  No oropharyngeal exudate  Eyes: Pupils are equal, round, and reactive to light  Conjunctivae and EOM are normal  Right eye exhibits no discharge  Left eye exhibits no discharge  No scleral icterus  Neck: Normal range of motion  Neck supple  No JVD present   No tracheal deviation present  No thyromegaly present  Cardiovascular: Normal rate, regular rhythm, normal heart sounds and intact distal pulses  Exam reveals no gallop and no friction rub  No murmur heard  Pulmonary/Chest: Effort normal and breath sounds normal  No stridor  No respiratory distress  She has no wheezes  She has no rales  She exhibits no tenderness  Abdominal: Soft  Bowel sounds are normal  She exhibits no distension and no mass  There is no tenderness  There is no rebound and no guarding  No hernia  Lymphadenopathy:     She has no cervical adenopathy  Neurological: She is alert and oriented to person, place, and time  Skin: Skin is warm and dry  Capillary refill takes less than 2 seconds  No rash noted  She is not diaphoretic  No erythema  No pallor  Psychiatric: She has a normal mood and affect  Vitals reviewed        Pertinent Laboratory/Diagnostic Studies:  CBC:   Lab Results   Component Value Date/Time    WBC 7 76 06/19/2018 03:05 PM    RBC 4 45 06/19/2018 03:05 PM    HGB 12 6 06/19/2018 03:05 PM    HCT 40 0 06/19/2018 03:05 PM    MCV 90 06/19/2018 03:05 PM    MCH 28 3 06/19/2018 03:05 PM    MCHC 31 5 06/19/2018 03:05 PM    RDW 12 7 06/19/2018 03:05 PM    MPV 12 5 06/19/2018 03:05 PM     06/19/2018 03:05 PM    NRBC 0 06/19/2018 03:05 PM    NEUTOPHILPCT 70 06/19/2018 03:05 PM    LYMPHOPCT 21 06/19/2018 03:05 PM    MONOPCT 8 06/19/2018 03:05 PM    EOSPCT 1 06/19/2018 03:05 PM    BASOPCT 0 06/19/2018 03:05 PM    NEUTROABS 5 40 06/19/2018 03:05 PM    LYMPHSABS 1 61 06/19/2018 03:05 PM    MONOSABS 0 59 06/19/2018 03:05 PM    EOSABS 0 11 06/19/2018 03:05 PM     Chemistry Profile:   Lab Results   Component Value Date/Time    K 4 2 05/23/2018 08:18 AM     05/23/2018 08:18 AM    CO2 28 05/23/2018 08:18 AM    BUN 13 05/23/2018 08:18 AM    CREATININE 0 70 05/23/2018 08:18 AM    GLUC 102 01/21/2016 11:58 AM    GLUF 124 (H) 05/23/2018 08:18 AM    CALCIUM 9 0 05/23/2018 08:18 AM AST 26 05/23/2018 08:18 AM    ALT 40 05/23/2018 08:18 AM    ALKPHOS 56 05/23/2018 08:18 AM    EGFR 98 05/23/2018 08:18 AM         Current Medications     Current Outpatient Medications:     PARoxetine (PAXIL) 20 mg tablet, Take 1 tablet (20 mg total) by mouth daily, Disp: 5 tablet, Rfl: 0    QUEtiapine (SEROquel) 100 mg tablet, Take 100 mg by mouth daily at bedtime, Disp: , Rfl: 0    traZODone (DESYREL) 50 mg tablet, TAKE 1 TABLET BY MOUTH AT BEDTIME, MAY TAKE UP TO 2 TABLETS FOR SLEEP, Disp: , Rfl: 1    zolpidem (AMBIEN) 5 mg tablet, TAKE 1 TABLET BY MOUTH AT BEDTIME AS NEEDED FOR INSOMIA, Disp: , Rfl: 1    FLUARIX QUADRIVALENT 0 5 ML IVÁN, Use as directed, Disp: , Rfl: 0    Menthol-Methyl Salicylate (JG WILKERSON GREASELESS) 10-15 % greaseless cream, Apply topically daily at bedtime (Patient not taking: Reported on 3/13/2019), Disp: 30 g, Rfl: 0    Misc   Devices (SITZ BATH) MISC, by Does not apply route, Disp: , Rfl:     Health Maintenance     Health Maintenance   Topic Date Due    BMI: Followup Plan  12/29/1980    PT PLAN OF CARE  02/16/2019    INFLUENZA VACCINE  10/05/2019 (Originally 7/1/2019)    MAMMOGRAM  06/12/2020    BMI: Adult  08/05/2020    PAP SMEAR  03/13/2022    CRC Screening: Colonoscopy  11/19/2024    DTaP,Tdap,and Td Vaccines (2 - Td) 08/30/2026    Pneumococcal Vaccine: 65+ Years (1 of 2 - PCV13) 12/29/2027    Hepatitis C Screening  Completed    Pneumococcal Vaccine: Pediatrics (0 to 5 Years) and At-Risk Patients (6 to 59 Years)  Aged Out    HEPATITIS B VACCINES  Aged [employer] History   Administered Date(s) Administered    INFLUENZA 11/02/2017, 09/15/2018    Influenza Quadrivalent Preservative Free 3 years and older IM 01/21/2016    Influenza, injectable, quadrivalent, preservative free 0 5 mL 09/15/2018    Tdap 08/30/2016       Steinberg Large  8/5/2019 11:29 AM

## 2020-04-09 ENCOUNTER — TELEMEDICINE (OUTPATIENT)
Dept: INTERNAL MEDICINE CLINIC | Facility: CLINIC | Age: 60
End: 2020-04-09

## 2020-04-09 DIAGNOSIS — E66.01 CLASS 3 SEVERE OBESITY DUE TO EXCESS CALORIES WITH BODY MASS INDEX (BMI) OF 40.0 TO 44.9 IN ADULT, UNSPECIFIED WHETHER SERIOUS COMORBIDITY PRESENT (HCC): Primary | ICD-10-CM

## 2020-04-09 DIAGNOSIS — R73.03 PRE-DIABETES: ICD-10-CM

## 2020-04-09 PROCEDURE — T1015 CLINIC SERVICE: HCPCS | Performed by: HOSPITALIST

## 2020-04-09 PROCEDURE — 99214 OFFICE O/P EST MOD 30 MIN: CPT | Performed by: HOSPITALIST

## 2020-05-03 ENCOUNTER — APPOINTMENT (OUTPATIENT)
Dept: LAB | Facility: HOSPITAL | Age: 60
End: 2020-05-03
Payer: COMMERCIAL

## 2020-05-03 DIAGNOSIS — R73.03 PRE-DIABETES: ICD-10-CM

## 2020-05-03 LAB
EST. AVERAGE GLUCOSE BLD GHB EST-MCNC: 128 MG/DL
HBA1C MFR BLD: 6.1 %

## 2020-05-03 PROCEDURE — 83036 HEMOGLOBIN GLYCOSYLATED A1C: CPT

## 2020-05-03 PROCEDURE — 36415 COLL VENOUS BLD VENIPUNCTURE: CPT

## 2020-09-30 ENCOUNTER — OFFICE VISIT (OUTPATIENT)
Dept: INTERNAL MEDICINE CLINIC | Facility: CLINIC | Age: 60
End: 2020-09-30

## 2020-09-30 VITALS
HEART RATE: 84 BPM | OXYGEN SATURATION: 99 % | BODY MASS INDEX: 40.69 KG/M2 | TEMPERATURE: 96.8 F | DIASTOLIC BLOOD PRESSURE: 94 MMHG | WEIGHT: 207.23 LBS | HEIGHT: 60 IN | SYSTOLIC BLOOD PRESSURE: 140 MMHG

## 2020-09-30 DIAGNOSIS — Z23 NEED FOR INFLUENZA VACCINATION: ICD-10-CM

## 2020-09-30 DIAGNOSIS — L21.0 DANDRUFF: Primary | ICD-10-CM

## 2020-09-30 PROCEDURE — 90682 RIV4 VACC RECOMBINANT DNA IM: CPT | Performed by: INTERNAL MEDICINE

## 2020-09-30 PROCEDURE — 99213 OFFICE O/P EST LOW 20 MIN: CPT | Performed by: INTERNAL MEDICINE

## 2020-09-30 PROCEDURE — 1036F TOBACCO NON-USER: CPT | Performed by: INTERNAL MEDICINE

## 2020-09-30 PROCEDURE — 90471 IMMUNIZATION ADMIN: CPT | Performed by: INTERNAL MEDICINE

## 2020-09-30 PROCEDURE — 3008F BODY MASS INDEX DOCD: CPT | Performed by: INTERNAL MEDICINE

## 2020-09-30 RX ORDER — BLOOD PRESSURE TEST KIT
KIT MISCELLANEOUS DAILY
Qty: 1 EACH | Refills: 0 | Status: SHIPPED | OUTPATIENT
Start: 2020-09-30

## 2020-10-11 ENCOUNTER — APPOINTMENT (OUTPATIENT)
Dept: LAB | Facility: HOSPITAL | Age: 60
End: 2020-10-11
Payer: COMMERCIAL

## 2020-10-11 DIAGNOSIS — L21.0 DANDRUFF: ICD-10-CM

## 2020-10-11 LAB
ANION GAP SERPL CALCULATED.3IONS-SCNC: 3 MMOL/L (ref 4–13)
BASOPHILS # BLD AUTO: 0.02 THOUSANDS/ΜL (ref 0–0.1)
BASOPHILS NFR BLD AUTO: 0 % (ref 0–1)
BUN SERPL-MCNC: 11 MG/DL (ref 5–25)
CALCIUM SERPL-MCNC: 9.5 MG/DL (ref 8.3–10.1)
CHLORIDE SERPL-SCNC: 105 MMOL/L (ref 100–108)
CO2 SERPL-SCNC: 29 MMOL/L (ref 21–32)
CREAT SERPL-MCNC: 0.6 MG/DL (ref 0.6–1.3)
EOSINOPHIL # BLD AUTO: 0.07 THOUSAND/ΜL (ref 0–0.61)
EOSINOPHIL NFR BLD AUTO: 1 % (ref 0–6)
ERYTHROCYTE [DISTWIDTH] IN BLOOD BY AUTOMATED COUNT: 12.4 % (ref 11.6–15.1)
GFR SERPL CREATININE-BSD FRML MDRD: 102 ML/MIN/1.73SQ M
GLUCOSE P FAST SERPL-MCNC: 108 MG/DL (ref 65–99)
HCT VFR BLD AUTO: 40.9 % (ref 34.8–46.1)
HGB BLD-MCNC: 13.2 G/DL (ref 11.5–15.4)
IMM GRANULOCYTES # BLD AUTO: 0.01 THOUSAND/UL (ref 0–0.2)
IMM GRANULOCYTES NFR BLD AUTO: 0 % (ref 0–2)
LYMPHOCYTES # BLD AUTO: 1.41 THOUSANDS/ΜL (ref 0.6–4.47)
LYMPHOCYTES NFR BLD AUTO: 23 % (ref 14–44)
MCH RBC QN AUTO: 28.6 PG (ref 26.8–34.3)
MCHC RBC AUTO-ENTMCNC: 32.3 G/DL (ref 31.4–37.4)
MCV RBC AUTO: 89 FL (ref 82–98)
MONOCYTES # BLD AUTO: 0.55 THOUSAND/ΜL (ref 0.17–1.22)
MONOCYTES NFR BLD AUTO: 9 % (ref 4–12)
NEUTROPHILS # BLD AUTO: 4.1 THOUSANDS/ΜL (ref 1.85–7.62)
NEUTS SEG NFR BLD AUTO: 67 % (ref 43–75)
NRBC BLD AUTO-RTO: 0 /100 WBCS
PLATELET # BLD AUTO: 227 THOUSANDS/UL (ref 149–390)
PMV BLD AUTO: 12 FL (ref 8.9–12.7)
POTASSIUM SERPL-SCNC: 4.3 MMOL/L (ref 3.5–5.3)
RBC # BLD AUTO: 4.61 MILLION/UL (ref 3.81–5.12)
SODIUM SERPL-SCNC: 137 MMOL/L (ref 136–145)
WBC # BLD AUTO: 6.16 THOUSAND/UL (ref 4.31–10.16)

## 2020-10-11 PROCEDURE — 85025 COMPLETE CBC W/AUTO DIFF WBC: CPT

## 2020-10-11 PROCEDURE — 80048 BASIC METABOLIC PNL TOTAL CA: CPT

## 2020-10-11 PROCEDURE — 36415 COLL VENOUS BLD VENIPUNCTURE: CPT

## 2020-10-26 ENCOUNTER — TELEPHONE (OUTPATIENT)
Dept: INTERNAL MEDICINE CLINIC | Facility: CLINIC | Age: 60
End: 2020-10-26

## 2020-10-28 ENCOUNTER — OFFICE VISIT (OUTPATIENT)
Dept: INTERNAL MEDICINE CLINIC | Facility: CLINIC | Age: 60
End: 2020-10-28

## 2020-10-28 VITALS
BODY MASS INDEX: 40.05 KG/M2 | HEIGHT: 60 IN | HEART RATE: 96 BPM | DIASTOLIC BLOOD PRESSURE: 98 MMHG | WEIGHT: 204 LBS | TEMPERATURE: 96 F | SYSTOLIC BLOOD PRESSURE: 128 MMHG | OXYGEN SATURATION: 98 %

## 2020-10-28 DIAGNOSIS — M25.562 ACUTE PAIN OF LEFT KNEE: Primary | ICD-10-CM

## 2020-10-28 PROCEDURE — 3725F SCREEN DEPRESSION PERFORMED: CPT | Performed by: INTERNAL MEDICINE

## 2020-10-28 PROCEDURE — 99213 OFFICE O/P EST LOW 20 MIN: CPT | Performed by: INTERNAL MEDICINE

## 2020-11-05 ENCOUNTER — OFFICE VISIT (OUTPATIENT)
Dept: INTERNAL MEDICINE CLINIC | Facility: CLINIC | Age: 60
End: 2020-11-05

## 2020-11-05 VITALS
SYSTOLIC BLOOD PRESSURE: 126 MMHG | WEIGHT: 205 LBS | BODY MASS INDEX: 40.25 KG/M2 | OXYGEN SATURATION: 97 % | HEIGHT: 60 IN | HEART RATE: 91 BPM | TEMPERATURE: 96.2 F | DIASTOLIC BLOOD PRESSURE: 86 MMHG

## 2020-11-05 DIAGNOSIS — L85.3 DRY SKIN: ICD-10-CM

## 2020-11-05 DIAGNOSIS — E66.9 OBESITY, UNSPECIFIED CLASSIFICATION, UNSPECIFIED OBESITY TYPE, UNSPECIFIED WHETHER SERIOUS COMORBIDITY PRESENT: ICD-10-CM

## 2020-11-05 DIAGNOSIS — M70.52 BURSITIS OF LEFT KNEE, UNSPECIFIED BURSA: Primary | ICD-10-CM

## 2020-11-05 PROBLEM — M71.9 BURSITIS: Status: ACTIVE | Noted: 2020-11-05

## 2020-11-05 PROCEDURE — 99203 OFFICE O/P NEW LOW 30 MIN: CPT | Performed by: INTERNAL MEDICINE

## 2020-11-05 PROCEDURE — 1036F TOBACCO NON-USER: CPT | Performed by: INTERNAL MEDICINE

## 2020-11-05 PROCEDURE — 3008F BODY MASS INDEX DOCD: CPT | Performed by: INTERNAL MEDICINE

## 2020-11-05 RX ORDER — PETROLATUM 0.61 G/G
CREAM TOPICAL AS NEEDED
Qty: 397 G | Refills: 0 | Status: SHIPPED | OUTPATIENT
Start: 2020-11-05

## 2020-12-02 DIAGNOSIS — M25.562 ACUTE PAIN OF LEFT KNEE: ICD-10-CM

## 2020-12-03 ENCOUNTER — TELEPHONE (OUTPATIENT)
Dept: FAMILY MEDICINE CLINIC | Facility: CLINIC | Age: 60
End: 2020-12-03

## 2020-12-28 DIAGNOSIS — M25.562 ACUTE PAIN OF LEFT KNEE: ICD-10-CM

## 2021-01-13 ENCOUNTER — OFFICE VISIT (OUTPATIENT)
Dept: INTERNAL MEDICINE CLINIC | Facility: CLINIC | Age: 61
End: 2021-01-13

## 2021-01-13 VITALS
TEMPERATURE: 98.1 F | WEIGHT: 197 LBS | BODY MASS INDEX: 38.68 KG/M2 | HEIGHT: 60 IN | SYSTOLIC BLOOD PRESSURE: 136 MMHG | DIASTOLIC BLOOD PRESSURE: 82 MMHG | HEART RATE: 89 BPM

## 2021-01-13 DIAGNOSIS — M72.2 PLANTAR FASCIITIS: Primary | ICD-10-CM

## 2021-01-13 PROCEDURE — 1036F TOBACCO NON-USER: CPT | Performed by: INTERNAL MEDICINE

## 2021-01-13 PROCEDURE — 99213 OFFICE O/P EST LOW 20 MIN: CPT | Performed by: INTERNAL MEDICINE

## 2021-01-13 PROCEDURE — 3008F BODY MASS INDEX DOCD: CPT | Performed by: INTERNAL MEDICINE

## 2021-01-13 NOTE — PROGRESS NOTES
INTERNAL MEDICINE FOLLOW-UP OFFICE VISIT  [facility]  [address] [place], [place] [address]    NAME: Amber Lopez  AGE: 64 y o  SEX: female    DATE OF ENCOUNTER: 1/13/2021    Assessment and Plan     Obesity:  Patient reports improved exercise habits over the last several months and she has lost 8 lb since I last saw her to half months ago  She does continue to have a poor diet and dietary recommendations were reviewed with her   -encouraged continued exercise  -dietary compliance recommended    Plantar fasciitis:  Most likely diagnosis by history  -patient advised to wear shoes with arch support  -Tylenol/NSAIDs  -stretching exercises      BMI Counseling: Body mass index is 38 47 kg/m²  The BMI is above normal  Nutrition recommendations include reducing portion sizes, decreasing overall calorie intake, 3-5 servings of fruits/vegetables daily, reducing fast food intake, consuming healthier snacks, decreasing soda and/or juice intake, moderation in carbohydrate intake and increasing intake of lean protein  Exercise recommendations include moderate aerobic physical activity for 150 minutes/week, vigorous aerobic physical activity for 75 minutes/week and exercising 3-5 times per week  No orders of the defined types were placed in this encounter  Chief Complaint     Chief Complaint   Patient presents with    Follow-up     Patient here today for R heel pain  x 2 weeks  Denies trauma       History of Present Illness     HPI     65-year-old female presents to discuss right heel pain going on over the last 2 weeks  Without traumatic onset  Pain is worse on the sole by the heel with onset with activity 1st thing in the morning and after a period of rest   Without noted aggravating or relieving factors other than activity  Patient states she has not tried any medications for this pain    Patient was advised regarding likely diagnosis of plantar fasciitis appropriate treatments including NSAIDs/Tylenol, shoe inserts, stretching exercises  Patient also advised regarding her weight  Patient has lost 8 lb over the last 2 and half months which she attributes to increased exercise  She does continue to eat a diet high in calories and carbohydrates      The following portions of the patient's history were reviewed and updated as appropriate: allergies, current medications, past family history, past medical history, past social history, past surgical history and problem list     Review of Systems     Review of Systems    Active Problem List     Patient Active Problem List   Diagnosis    Depression    Insomnia    Colon cancer screening    Breast cancer screening    Neuropathy    Cervical paraspinal muscle spasm    Elevated glucose    Back pain    Encounter for annual physical examination excluding gynecological examination in a patient older than 17 years    Left knee pain    Bursitis       Objective     /82 (BP Location: Right arm, Patient Position: Sitting, Cuff Size: Large)   Pulse 89   Temp 98 1 °F (36 7 °C)   Ht 5' (1 524 m)   Wt 89 4 kg (197 lb)   LMP  (LMP Unknown)   BMI 38 47 kg/m²     Physical Exam    Pertinent Laboratory/Diagnostic Studies:  CBC:   Lab Results   Component Value Date/Time    WBC 6 16 10/11/2020 10:31 AM    RBC 4 61 10/11/2020 10:31 AM    HGB 13 2 10/11/2020 10:31 AM    HCT 40 9 10/11/2020 10:31 AM    MCV 89 10/11/2020 10:31 AM    MCH 28 6 10/11/2020 10:31 AM    MCHC 32 3 10/11/2020 10:31 AM    RDW 12 4 10/11/2020 10:31 AM    MPV 12 0 10/11/2020 10:31 AM     10/11/2020 10:31 AM    NRBC 0 10/11/2020 10:31 AM    NEUTOPHILPCT 67 10/11/2020 10:31 AM    LYMPHOPCT 23 10/11/2020 10:31 AM    MONOPCT 9 10/11/2020 10:31 AM    EOSPCT 1 10/11/2020 10:31 AM    BASOPCT 0 10/11/2020 10:31 AM    NEUTROABS 4 10 10/11/2020 10:31 AM    LYMPHSABS 1 41 10/11/2020 10:31 AM    MONOSABS 0 55 10/11/2020 10:31 AM    EOSABS 0 07 10/11/2020 10:31 AM Chemistry Profile:   Lab Results   Component Value Date/Time    K 4 3 10/11/2020 10:31 AM     10/11/2020 10:31 AM    CO2 29 10/11/2020 10:31 AM    BUN 11 10/11/2020 10:31 AM    CREATININE 0 60 10/11/2020 10:31 AM    GLUC 102 01/21/2016 11:58 AM    GLUF 108 (H) 10/11/2020 10:31 AM    CALCIUM 9 5 10/11/2020 10:31 AM    AST 26 05/23/2018 08:18 AM    ALT 40 05/23/2018 08:18 AM    ALKPHOS 56 05/23/2018 08:18 AM    EGFR 102 10/11/2020 10:31 AM     CBC:   Results from Last 12 Months   Lab Units 10/11/20  1031   WBC Thousand/uL 6 16   RBC Million/uL 4 61   HEMOGLOBIN g/dL 13 2   HEMATOCRIT % 40 9   MCV fL 89   MCH pg 28 6   MCHC g/dL 32 3   RDW % 12 4   MPV fL 12 0   PLATELETS Thousands/uL 227   NRBC AUTO /100 WBCs 0   NEUTROS PCT % 67   LYMPHS PCT % 23   MONOS PCT % 9   EOS PCT % 1   BASOS PCT % 0   NEUTROS ABS Thousands/µL 4 10   LYMPHS ABS Thousands/µL 1 41   MONOS ABS Thousand/µL 0 55   EOS ABS Thousand/µL 0 07     Chemistry Profile:   Results from Last 12 Months   Lab Units 10/11/20  1031   POTASSIUM mmol/L 4 3   CHLORIDE mmol/L 105   CO2 mmol/L 29   BUN mg/dL 11   CREATININE mg/dL 0 60   GLUCOSE FASTING mg/dL 108*   CALCIUM mg/dL 9 5   EGFR ml/min/1 73sq m 102       Current Medications     Current Outpatient Medications:     Blood Pressure KIT, by Does not apply route daily, Disp: 1 each, Rfl: 0    diclofenac sodium (VOLTAREN) 1 %, APPLY 2 G TOPICALLY 4 (FOUR) TIMES A DAY, Disp: 100 g, Rfl: 0    FLUARIX QUADRIVALENT 0 5 ML IVÁN, Use as directed, Disp: , Rfl: 0    Misc   Devices (SITZ BATH) MISC, by Does not apply route, Disp: , Rfl:     PARoxetine (PAXIL) 20 mg tablet, Take 1 tablet (20 mg total) by mouth daily, Disp: 5 tablet, Rfl: 0    Skin Protectants, Misc  (eucerin) cream, Apply topically as needed for wound care, Disp: 397 g, Rfl: 0    traZODone (DESYREL) 50 mg tablet, TAKE 1 TABLET BY MOUTH AT BEDTIME, MAY TAKE UP TO 2 TABLETS FOR SLEEP, Disp: , Rfl: 1    Health Maintenance     Health Maintenance   Topic Date Due    HIV Screening  12/29/1975    PT PLAN OF CARE  02/16/2019    Annual Physical  03/13/2020    MAMMOGRAM  06/12/2020    BMI: Followup Plan  04/09/2021    Depression Remission PHQ  10/28/2021    BMI: Adult  01/13/2022    Cervical Cancer Screening  03/13/2022    Colorectal Cancer Screening  11/19/2024    DTaP,Tdap,and Td Vaccines (2 - Td) 08/30/2026    Hepatitis C Screening  Completed    Influenza Vaccine  Completed    Pneumococcal Vaccine: Pediatrics (0 to 5 Years) and At-Risk Patients (6 to 59 Years)  Aged Out    HIB Vaccine  Aged Out    Hepatitis B Vaccine  Aged Out    IPV Vaccine  Aged Out    Hepatitis A Vaccine  Aged Out    Meningococcal ACWY Vaccine  Aged Out    HPV Vaccine  Aged [employer] History   Administered Date(s) Administered    INFLUENZA 11/02/2017, 09/15/2018    Influenza Quadrivalent Preservative Free 3 years and older IM 01/21/2016    Influenza, injectable, quadrivalent, preservative free 0 5 mL 09/15/2018    Influenza, recombinant, quadrivalent,injectable, preservative free 09/30/2020    Tdap 08/30/2016       Dora Murphy  1/13/2021 5:43 PM

## 2021-01-13 NOTE — PATIENT INSTRUCTIONS
Fascitis plantar   LO QUE NECESITA SABER:   El reposo, el hielo y los soportes para pies pueden ayudar a dang fascia plantar a sanar  Es posible que usted necesite medicamentos para disminuir la hinchazón y el dolor  Un fisioterapeuta puede enseñarle ejercicios que contribuyan a mejorar dang movimiento y fuerza, y a aliviar el dolor  INSTRUCCIONES SOBRE EL JOAN HOSPITALARIA:   Comuníquese con dang médico o terapeuta si:  · Dang dolor e inflamación aumentan    · Presenta dolor de rodilla, cadera o espalda  · Usted tiene preguntas o inquietudes acerca de dang condición o cuidado  Medicamentos: Es posible que usted necesite alguno de los siguientes:  · Acetaminofén lewis el dolor y baja la fiebre  Está disponible sin receta médica  Pregunte qué cantidad debe darle a dang dread y con qué frecuencia  [facility]  Anila las etiquetas de todos los demás medicamentos que esté tomando dang hijo para saber si también contienen acetaminofén, o pregunte a dang médico o farmacéutico  El acetaminofén puede causar daño en el hígado cuando no se marcy de forma correcta  · [place], ramona el ibuprofeno, [facility] a disminuir la inflamación, el dolor y la [place]  Los PIOTR pueden causar sangrado estomacal o problemas renales en ciertas personas  Si usted marcy un medicamento anticoagulante, siempre pregúntele a dang médico si los PIOTR son seguros para usted  Siempre anila la etiqueta de sawyer medicamento y [place] instrucciones  · [place] lynnette medicamentos ramona se le haya indicado  Consulte con dang médico si usted rylie que dang medicamento no le está ayudando o si presenta efectos secundarios  Infórmele si es alérgico a algún medicamento  Mantenga mc lista actualizada de los [place], las vitaminas y los productos herbales que marcy  Incluya los siguientes datos de los medicamentos: cantidad, frecuencia y motivo de administración  Traiga con usted la lista o los envases de las píldoras a lynnette citas de seguimiento   Lleve la [facility] medicamentos con usted en isaac de mc emergencia  Cuidados personales:  · Use la férula o las plantillas ramona se le indique  Es posible que usted necesite usar mc férula por la noche para mantener el pie estirado mientras duerme  [employer] ayudará a evitar el dolor kaia temprano por la [place]  Las plantillas ayudarán a disminuir la tensión en la fascia plantar al caminar o hacer ejercicio  · Repose según le indicaron  Descanse tanto ramona sea posible para disminuir la hinchazón y evitar un daño mayor  Pregunte a dang médico cuándo puede retomar lynnette [employer]  · Aplique hielo sobre la fascia plantar  El hielo ayuda a evitar daño al tejido y a disminuir la inflamación y el dolor  Llene mc botella de agua con agua y congélela  Envuelva mc toalla alrededor de la botella o cúbrala con mc deidra de [place]  Ruede la botella del agua bajo el pie por 10 minutos en la mañana y después del [place]  · Masaje de la fascia plantar ramona le indicaron  [employer] podría ayudar a disminuir la hinchazón y el dolor  Ruede mc pelota de golf debajo de los pies layla 10 minutos  Renetta esto 3 veces al día  · Vaya a terapia física según indicaciones  Un fisioterapeuta le puede enseñar ejercicios para ayudarle a mejorar el movimiento y la fuerza, y para disminuir el dolor  Evite la fascitis plantar:  · Mantenga un peso saludable  [employer] ayudará a diminuir la [place]  Consulte con dang médico cuánto debería pesar  Pida que le ayude a crear un plan para bajar de peso si usted tiene sobrepeso  · Renetta ejercicios de bajo impacto  Los ejercicios de bajo impacto disminuyen la tensión en la fascia plantar  Por ejemplo, nadar o andar en bicicleta  · Comience nuevas actividades lentamente  Aumente gradualmente la intensidad y la duración  · Use calzado que le quede jamila y que brinde soporte al arco  Reemplace el calzado antes de que la plantilla o el amortiguador de golpes se desgaste   Evite caminar o pararse descalzo o en sandalias por largos períodos de tiempo  · Realice ejercicios de calentamiento antes de hacer mc actividad física  Pregunte a dang médico cómo estirar los músculos de la fascia plantar y la pantorilla  Acuda a lynnette consultas de control con dang médico o podólogo según le indicaron: Anote lynnette preguntas para que se acuerde de hacerlas layla lynnette visitas  © [place] [address] Information is for End User's use only and may not be sold, redistributed or otherwise used for commercial purposes  All illustrations and images included in CareNotes® are the copyrighted property of [employer]  or [address] [place] [address] es sólo para uso en educación  Dang intención no es darle un consejo médico sobre enfermedades o tratamientos  Colsulte con dang Rolanda Bough farmacéutico antes de seguir cualquier régimen médico para saber si es seguro y efectivo para usted

## 2021-01-23 DIAGNOSIS — M25.562 ACUTE PAIN OF LEFT KNEE: ICD-10-CM

## 2021-01-27 ENCOUNTER — EVALUATION (OUTPATIENT)
Dept: PHYSICAL THERAPY | Facility: CLINIC | Age: 61
End: 2021-01-27
Payer: COMMERCIAL

## 2021-01-27 DIAGNOSIS — M79.671 RIGHT FOOT PAIN: ICD-10-CM

## 2021-01-27 DIAGNOSIS — M70.52 BURSITIS OF LEFT KNEE, UNSPECIFIED BURSA: Primary | ICD-10-CM

## 2021-01-27 PROCEDURE — 97162 PT EVAL MOD COMPLEX 30 MIN: CPT | Performed by: PHYSICAL THERAPIST

## 2021-01-27 PROCEDURE — 97110 THERAPEUTIC EXERCISES: CPT | Performed by: PHYSICAL THERAPIST

## 2021-01-27 NOTE — PROGRESS NOTES
PT Evaluation    Today's date: 2021   Patient name: Ai Minaya  : 1960  MRN: [id number]  Referring provider: Alejandrina Restrepo MD  Dx:   Encounter Diagnosis     ICD-10-CM    1  Bursitis of left knee, unspecified bursa  M70 52 Ambulatory referral to Physical Therapy           Subjective Evaluation     History of Present Illness    Patient presents with c/o R foot pain( plantar fascitis) and L knee pain  It has been 2 months and her knee gave her much pain and was diagnosed c bursitis  She did have a fall in the gym 2 years ago and fell from a machine on her L shoulder and knee and had pain for quite a while  She will be seeing orthopedic surgery  She has arthritis in her neck and low back  She does walk c a cane for the last month  Neuro signs: tingling in L toes  Red flags: none  Occupation: Disability- due to depression, anxiety, bipolar      Pain  At best pain ratin  At worst pain ratin  Location: L anterior knee and R plantar surface and dorsal medial surfaceand B shin   Quality: punsante(pulsing), molesta (bothersome)  Relieving factors: tylenol, lying on couch, ice- helps a bit  aggravating factors: First thing in the AM, walk 1/2 mile-     Social Support  Lives with her son and daughter- has 1 flight at home c 5 steps        Patient Goals  Patient goals for therapy: To improve mobility and walk better    STGs  1  Decrease pain by 20% in 2-4 weeks to improve standing tolerance  2  Improve knee ROM by 10 degrees in 2-4 weeks to improve sit to stand  3  Improve hip strength by 1/3 grade in 2-4 weeks to improve stairs performance to a full flight c UE assist in step through pattern  LTGs  1  Decrease pain by 60% in 6-8 weeks  2  Improve walking tolerance to >30 minutes in 6-8 weeks to perform community ambulation  3  Perform job/dressing/showering activities independently without pain in 6-8 weeks  4  Improve stairs tolerance to 1 flight  in 8 weeks         Objective Measurements:    Observation: R rearfoot valgus> L    Gait: decr WB RLE and L foot quick step feet both in IR  Sensation:WFL  Reflexes:NT  Functional squat: Decreased depth and good pronation but pain in R medial ankle  Slump:- SLR:-  Ovidio: - Faddir: L knee pain      Meniscal Pathology Composite Score:   Hx of locking/catching:-  Joint line tenderness: + Pain with hyperextension: - Pain with hyperflexion:-  Yancy:-     MCL0: - MCL30:-  LCL0-: LCL30: -   KISHAN: Tibial ER limited both in 90 and 30 knee flexion on LLE    Knee A/PROM L R Strength- difficulty following instructions L R   Flex   /  / Flex 4 4   Ext  /  / Ext 4 4           Hip A/PROM        Flex  /  / Flex 3+ 3+   ER at 90   ER     IR at 90   IR     Abd   Abd     Ext   Ext             Ankle  AROM   Ankle     DFlex 3 2 DF 4 4   PF 53 52 pf 10 reps 10 reps slight pain    eversion 10 10 eversion     inversion 24 19 inversion             Lumbar AROM        flex WFL       ext Max limited and back pain               Assessment:    Elle Tolentino is a pleasant 64 y o  female who presents with referring diagnosis of L knee pain and R plantar fascitis  The patient's greatest concern is getting better mobility  No further referral appears necessary at this time based upon examination results  The primary movement impairment diagnosis is decreased L tibial ER hypomobility and decreased R calcaneal inversion resulting in pathoanatomical symptoms of L knee pain and plantar fascitis and limiting her ability to walk and perform activity on her feet    Impairments include:  1) Decreased strength  2) Decreased knee ROM   3) Postural deficits     Etiologic factors include progressed sxs after fall  Negative prognostic indicators:depression limited amount of appointments  Positive prognostic indicators: good attitude  Please contact me if you have any further questions or recommendations   Thank you very much for the kind referral         Plan  Patient would benefit from:Skilled physical therapy  Planned therapy interventions: manual therapy, neuromuscular re-education, stretching, strengthening, therapeutic activities, therapeutic exercise, patient education, home exercise program, and activity modification      Frequency: 2x week  Duration in weeks: 8  Treatment plan discussed with: patient             Goals: Patient's goal is To improve mobility and walk better    Precautions: depression, bipolar disorder, anxiety, mostly Kitt[place] speaking  Dx: R Plantar Fascitis, L knee pain      Daily Treatment Diary   Manuals 1/27/2021        L tibial  er gr 3 mob         R calcaneal PROM                           Ther Ex         bike         Hip abd/ext         hr         Christy WINSLOWB                                                      Neuro Re-ed         NBOS on foam-         Bridges                                    Ther Activity         Sit to stand                           Gait Training                           Modalities

## 2021-02-10 ENCOUNTER — APPOINTMENT (OUTPATIENT)
Dept: PHYSICAL THERAPY | Facility: CLINIC | Age: 61
End: 2021-02-10
Payer: COMMERCIAL

## 2021-02-22 NOTE — PROGRESS NOTES
Discharge Note  Yung Adame has made fair functional progress with physical therapy  she was educated and updated in her home exercise program  Radha Aysemaira will be discharged from formal therapy due to no further appointments made

## 2021-02-24 ENCOUNTER — OFFICE VISIT (OUTPATIENT)
Dept: PHYSICAL THERAPY | Facility: CLINIC | Age: 61
End: 2021-02-24
Payer: COMMERCIAL

## 2021-02-24 ENCOUNTER — APPOINTMENT (OUTPATIENT)
Dept: PHYSICAL THERAPY | Facility: CLINIC | Age: 61
End: 2021-02-24
Payer: COMMERCIAL

## 2021-02-24 DIAGNOSIS — M70.52 BURSITIS OF LEFT KNEE, UNSPECIFIED BURSA: ICD-10-CM

## 2021-02-24 DIAGNOSIS — M79.671 RIGHT FOOT PAIN: Primary | ICD-10-CM

## 2021-02-24 PROCEDURE — 97110 THERAPEUTIC EXERCISES: CPT | Performed by: PHYSICAL THERAPIST

## 2021-02-24 PROCEDURE — 97112 NEUROMUSCULAR REEDUCATION: CPT | Performed by: PHYSICAL THERAPIST

## 2021-02-24 PROCEDURE — 97140 MANUAL THERAPY 1/> REGIONS: CPT | Performed by: PHYSICAL THERAPIST

## 2021-02-24 NOTE — PROGRESS NOTES
Daily Note     Today's date: 2021  Patient name: Quan Varela  : 1960  MRN: [id number]  Referring provider: Nelda Johnson MD  Dx:   Encounter Diagnosis     ICD-10-CM    1  Right foot pain  M79 671    2  Bursitis of left knee, unspecified bursa  M70 52                   Subjective: She states she does still feel some pain and has trialed exercises 2 times  She has been quite busy with taking care of her 7  Month granddaughter  Objective: See treatment diary below      Assessment: Tolerated treatment well  She had no pain t/o exercises and just needed cues to not perform excessive reps  Updated HEPPatient would benefit from continued PT      Plan: Continue per plan of care  Will RE at [facility] in a months time        Goals: Patient's goal is To improve mobility and walk better    Precautions: depression, bipolar disorder, anxiety, mostly Serbian speaking  Dx: R Plantar Fascitis, L knee pain      Daily Treatment Diary   Manuals 2021       L tibial  er gr 3 mob  5'       R calcaneal PROM  5'                         Ther Ex         bike  6'       Hip abd/ext  20x       hr  20x       Clamshells GTB  20x       Tibial ER on slideboard   10x       Gastroc ST R  3x :20                                  Neuro Re-ed         NBOS on foam-  5x :10       Bridges  20x                                  Ther Activity         Sit to stand  20x                         Gait Training                           Modalities

## 2021-03-24 ENCOUNTER — APPOINTMENT (OUTPATIENT)
Dept: PHYSICAL THERAPY | Facility: CLINIC | Age: 61
End: 2021-03-24
Payer: COMMERCIAL

## 2021-04-05 ENCOUNTER — APPOINTMENT (OUTPATIENT)
Dept: PHYSICAL THERAPY | Facility: CLINIC | Age: 61
End: 2021-04-05
Payer: COMMERCIAL

## 2021-04-07 ENCOUNTER — OFFICE VISIT (OUTPATIENT)
Dept: PHYSICAL THERAPY | Facility: CLINIC | Age: 61
End: 2021-04-07
Payer: COMMERCIAL

## 2021-04-07 DIAGNOSIS — M70.52 BURSITIS OF LEFT KNEE, UNSPECIFIED BURSA: ICD-10-CM

## 2021-04-07 DIAGNOSIS — M79.671 RIGHT FOOT PAIN: Primary | ICD-10-CM

## 2021-04-07 PROCEDURE — 97530 THERAPEUTIC ACTIVITIES: CPT | Performed by: PHYSICAL THERAPIST

## 2021-04-07 PROCEDURE — 97110 THERAPEUTIC EXERCISES: CPT | Performed by: PHYSICAL THERAPIST

## 2021-04-07 PROCEDURE — 97112 NEUROMUSCULAR REEDUCATION: CPT | Performed by: PHYSICAL THERAPIST

## 2021-04-07 NOTE — PROGRESS NOTES
PT Re-Evaluation and Discharge note    Today's date: 2021   Patient name: Mica Calderon  : 1960  MRN: [id number]  Referring provider: Lety Karimi MD  Dx:   Encounter Diagnosis     ICD-10-CM    1  Right foot pain  M79 671    2  Bursitis of left knee, unspecified bursa  M70 52            Subjective Evaluation     History of Present Illness  She states she has more mobility and is able to do things at home  She is walking c her cane  She feels she has more R heel pain than L knee pain  She can walk about 15 mins before her pain comes on  Hx of injury:  Patient presents with c/o R foot pain( plantar fascitis) and L knee pain  It has been 2 months and her knee gave her much pain and was diagnosed c bursitis  She did have a fall in the gym 2 years ago and fell from a machine on her L shoulder and knee and had pain for quite a while  She will be seeing orthopedic surgery  She has arthritis in her neck and low back  She does walk c a cane for the last month  Neuro signs: tingling in L toes  Red flags: none  Occupation: Disability- due to depression, anxiety, bipolar      Pain  At best pain ratin  At worst pain ratin  Location: L anterior knee and R plantar surface and dorsal medial surfaceand B shin   Quality: punsante(pulsing), molesta (bothersome)  Relieving factors: tylenol, lying on couch, ice- helps a bit  aggravating factors: First thing in the AM, walk 1/2 mile- 15 mins    Social Support  Lives with her son and daughter- has 1 flight at home c 5 steps        Patient Goals  Patient goals for therapy: To improve mobility and walk better    STGs  1  Decrease pain by 20% in 2-4 weeks to improve standing tolerance  2  Improve knee ROM by 10 degrees in 2-4 weeks to improve sit to stand  - met  3  Improve hip strength by 1/3 grade in 2-4 weeks to improve stairs performance to a full flight c UE assist in step through pattern  - met      LTGs  1  Decrease pain by 60% in 6-8 weeks    2  Improve walking tolerance to >30 minutes in 6-8 weeks to perform community ambulation  - not met  3  Perform job/dressing/showering activities independently without pain in 6-8 weeks  - met  4  Improve stairs tolerance to 1 flight  in 8 weeks  - met      Objective Measurements:    Observation: R rearfoot valgus> L    Gait: decr WB RLE and L foot quick step feet both in IR  Sensation:WFL  Reflexes:NT  Functional squat: Decreased depth and good pronation but pain in R medial ankle  Slump:- SLR:-  Jeni Primmer: Irene Salharleym:-      Meniscal Pathology Composite Score:   Hx of locking/catching:-  Joint line tenderness: + Pain with hyperextension: - Pain with hyperflexion:-  Yancy:-     MCL0: - MCL30:-  LCL0-: LCL30: -   KISHAN: Tibial ER limited both in 90 and 30 knee flexion on LLE    Knee A/PROM L R Strength- difficulty following instructions L R   Flex   /  / Flex 4 4   Ext  /  / Ext 4 4           Hip A/PROM        Flex  /  / Flex 3+ 3+   ER at 90   ER     IR at 90   IR     Abd   Abd     Ext   Ext             Ankle  AROM   Ankle     DFlex 3 7 DF 4 4   PF 53 60 pf 10 reps 10 reps   eversion 10 15 eversion     inversion 24 19 inversion             Lumbar AROM        flex WFL       ext Max limited and back pain 5 reps WFL              Assessment:  Felipa Kathleen has demonstrated overall improvement in lumbar/knee ROM, strength, function, and a reduction in pain  Currently, she has made steady progress towards her goals, but continues to remain limited with postural deficits, and control up and down steps  At this time,she was shown how to perform taping to help R foot pain and she is independent in hep so will reach out for PT as needed  Plan  Patient would benefit from: Sweetie taping for arch, proper shoewear to help her hyperpronation and will be d/c to independent HEP     Planned therapy interventions: manual therapy, neuromuscular re-education, stretching, strengthening, therapeutic activities, therapeutic exercise, patient education, home exercise program, and activity modification      Treatment plan discussed with: patient and she was agreeable           Goals: Patient's goal is To improve mobility and walk better    Precautions: depression, bipolar disorder, anxiety, mostly Lithuanian speaking  Dx: R Plantar Fascitis, L knee pain      Daily Treatment Diary   Manuals 1/27/2021 2/24 4/7      L tibial  er gr 3 mob  5'       R calcaneal PROM  5'       R foot medial to lateral pull ML arch   5'               Ther Ex         bike  6' 6'      Hip abd/ext  20x 20x      hr  20x 20x      Clamshells GTB  20x 20x      Tibial ER on slideboard   10x       Gastroc ST R  3x :20                                  Neuro Re-ed         NBOS on foam-  5x :10       Bridges  20x 20x                                 Ther Activity         Sit to stand  20x 20x                        Gait Training                           Modalities

## 2021-07-08 ENCOUNTER — OFFICE VISIT (OUTPATIENT)
Dept: INTERNAL MEDICINE CLINIC | Facility: CLINIC | Age: 61
End: 2021-07-08

## 2021-07-08 VITALS
WEIGHT: 183.4 LBS | DIASTOLIC BLOOD PRESSURE: 76 MMHG | TEMPERATURE: 97.4 F | SYSTOLIC BLOOD PRESSURE: 130 MMHG | HEART RATE: 90 BPM | BODY MASS INDEX: 35.82 KG/M2

## 2021-07-08 DIAGNOSIS — Z12.31 SCREENING MAMMOGRAM, ENCOUNTER FOR: ICD-10-CM

## 2021-07-08 DIAGNOSIS — Z11.4 SCREENING FOR HIV (HUMAN IMMUNODEFICIENCY VIRUS): ICD-10-CM

## 2021-07-08 DIAGNOSIS — L24.9 IRRITANT CONTACT DERMATITIS, UNSPECIFIED TRIGGER: ICD-10-CM

## 2021-07-08 DIAGNOSIS — L21.0 DANDRUFF: ICD-10-CM

## 2021-07-08 DIAGNOSIS — R73.03 PRE-DIABETES: Primary | ICD-10-CM

## 2021-07-08 PROCEDURE — T1015 CLINIC SERVICE: HCPCS | Performed by: INTERNAL MEDICINE

## 2021-07-08 PROCEDURE — 99214 OFFICE O/P EST MOD 30 MIN: CPT | Performed by: INTERNAL MEDICINE

## 2021-07-08 RX ORDER — SELENIUM SULFIDE 2.5 MG/100ML
LOTION TOPICAL DAILY
Qty: 118 ML | Refills: 3 | Status: SHIPPED | OUTPATIENT
Start: 2021-07-08 | End: 2021-08-19

## 2021-07-08 RX ORDER — DIAPER,BRIEF,INFANT-TODD,DISP
EACH MISCELLANEOUS 2 TIMES DAILY PRN
Qty: 30 G | Refills: 0 | Status: SHIPPED | OUTPATIENT
Start: 2021-07-08 | End: 2021-07-19

## 2021-07-08 RX ORDER — KETOCONAZOLE 20 MG/ML
SHAMPOO TOPICAL
Qty: 120 ML | Refills: 1 | Status: SHIPPED | OUTPATIENT
Start: 2021-07-08 | End: 2021-10-27 | Stop reason: SDUPTHER

## 2021-07-08 RX ORDER — PAROXETINE 30 MG/1
30 TABLET, FILM COATED ORAL
COMMUNITY
Start: 2021-07-06

## 2021-07-08 NOTE — PATIENT INSTRUCTIONS
Today we spoke with you about your scalp itching  We think that this is due to dandruff that we will be giving you 2 prescriptions for  First, complete the course of selenium sulfide  If the entire course has not helped, take the entire course of ketoconazole shampoo  If both of these treatments do not help or they make things worse, stop taking the medication and call our office for a follow-up visit  Please make sure to not put any other products in your hair while we are treating the dandruff  For your neck and right hand, we are prescribing a steroid cream (hydrocortisone)  Please take this only as needed, as chronic use can thin your skin and make the treatment less effective if the rash returns  We also placed orders for some overall care management:   HIV screen, mammogram, a lipid panel, and an A1C for your prediabetes  For your prediabetes, please try to lose 7% or more of your current weight (183 lbs) and exercise for 30 minutes per day, at least 5 days a week  Please follow up in 8 weeks for your annual physical exam  Thank you!

## 2021-07-08 NOTE — PROGRESS NOTES
[facility] Visit Note  Alida Stinson 64 y o  female   MRN: [id number]    Assessment and Plan      Diagnoses and all orders for this visit:  Dandruff  Current working diagnosis is that patient's scalp itch is due to traditional dandruff  We will trial selenium sulfide, and if not resolved, completed course of ketoconazole shampoo  If both of these agents are unsuccessful  We will consider a referral to Dermatology  -     selenium sulfide (SELSUN) 2 5 % shampoo; Apply topically daily for 14 days Follow instructions on bottle  Each application should be <85 mL/day  Please use once a day for 14 days  Avoid mixing with other creams  -     ketoconazole (NIZORAL) 2 % shampoo; Apply once a day for 2 weeks for scalp dandruff    Pre-diabetes  Patient was educated about prediabetes  She declined referral to weight management clinic at this time  We discussed the importance of setting a 7% initial body weight goal for targeted weight loss as well as 30 minutes exercise 5 times a week via walking in order to reduce her A1c level  Pending A1c level results, we may trial metformin in a future visit as patient qualifies due to BMI and age range  -     Hemoglobin A1C; Future  -     Lipid Panel with Direct LDL reflex; Future    Irritant contact dermatitis, unspecified trigger  Neck irritation along clavicle, chest, posterior neck is suspected to be contact dermatitis  Patient frequently wears jewelry in this area but she has not made a correlation between jewelry wear and this rash  The rash is not flaking in nature and therefore we do not suspect this to be related to her scalp dandruff  -     hydrocortisone 1 % cream; Apply topically 2 (two) times a day as needed (For neck skin irritation)      Health Maintenance:   Routine screening for HIV, mammogram was ordered    Patient states that she has completed 2 doses of the COVID-19 vaccine and will bring records upon next visit   Screening for HIV (human immunodeficiency virus)  -     HIV 1/2 Antigen/Antibody (4th Generation) w Reflex [facility]; Future  Screening mammogram, encounter for  -     Mammo screening bilateral w 3d & cad; Future      Patient also had complaints about leg swelling but I did not appreciate significant swelling on my exam   She also complained about headache, but I suspect that this is secondary to her scalp itching per her history  I thanked the patient for allowing me to take part in her care  Schedule a follow-up appointment in 8 weeks for annual physical exam      Chief Complaint: Scalp itching  Subjective     History of Present Illness:  Mrs Bhupendra Hillman is a Croatian-speaking 73-year-old female with past medical history of prediabetes and unspecified joint pain in the elbows and back  History was obtained today from patient directly as well as daughter who was in room to interpret Croatian  Patient presents today with complaints scalp itching x1 year  She states that she was at the [employer] and had her hair dyed by someone was not a professional   Ever since that day, she has had significant scalp itching every day, diffusely over her scalp  She has tried some steroid creams and over-the-counter dandruff shampoo with little relief  She states that the itching is so bad that she is still constantly scratching and is unable to stop  No one else in the family has this issue  She also complains of some redness along her neck that seems to have an independent nature from her scalp  She states that the neck rash does not produce flakes  Review of Systems   Constitutional: Negative for chills and fever  HENT: Negative for ear pain and sore throat  +headache, scalp itching, flaking and redness, L ear itching, flaking and redness   Eyes: Negative for pain and visual disturbance  Respiratory: Negative for cough and shortness of breath  Cardiovascular: Positive for leg swelling   Negative for chest pain and palpitations  Gastrointestinal: Negative for abdominal pain and vomiting  Genitourinary: Negative for dysuria and hematuria  Musculoskeletal: Negative for arthralgias and back pain  Skin: Negative for color change and rash  Neurological: Negative for seizures and syncope  All other systems reviewed and are negative  Current Outpatient Medications:     Blood Pressure KIT, by Does not apply route daily, Disp: 1 each, Rfl: 0    PARoxetine (PAXIL) 30 mg tablet, Take 30 mg by mouth daily at bedtime, Disp: , Rfl:     traZODone (DESYREL) 50 mg tablet, TAKE 1 TABLET BY MOUTH AT BEDTIME, MAY TAKE UP TO 2 TABLETS FOR SLEEP, Disp: , Rfl: 1    FLUARIX QUADRIVALENT 0 5 ML IVÁN, Use as directed (Patient not taking: Reported on 2021), Disp: , Rfl: 0    hydrocortisone 1 % cream, Apply topically 2 (two) times a day as needed (For neck skin irritation), Disp: 30 g, Rfl: 0    ketoconazole (NIZORAL) 2 % shampoo, Apply once a day for 2 weeks for scalp dandruff, Disp: 120 mL, Rfl: 1    Misc  Devices (SITZ BATH) MISC, by Does not apply route (Patient not taking: Reported on 2021), Disp: , Rfl:     selenium sulfide (SELSUN) 2 5 % shampoo, Apply topically daily for 14 days Follow instructions on bottle  Each application should be <24 mL/day  Please use once a day for 14 days  Avoid mixing with other creams  , Disp: 118 mL, Rfl: 3    Skin Protectants, Misc  (eucerin) cream, Apply topically as needed for wound care (Patient not taking: Reported on 2021), Disp: 397 g, Rfl: 0  Past Medical History:   Diagnosis Date    Anemia     Anxiety disorder     Bipolar disorder ([facility] [address] )     Psychiatric disorder      Past Surgical History:   Procedure Laterality Date     SECTION      , ,    [facility] DENTAL SURGERY      TUBAL LIGATION       Social History     Socioeconomic History    Marital status: /Civil Union     Spouse name: Not on file    Number of children: Not on file  Years of education: Not on file    Highest education level: Not on file   Occupational History    Not on file   Tobacco Use    Smoking status: Never Smoker    Smokeless tobacco: Never Used   Vaping Use    Vaping Use: Never used   Substance and Sexual Activity    Alcohol use: No    Drug use: No    Sexual activity: Not Currently     Partners: Male     Birth control/protection: Condom Female   Other Topics Concern    Not on file   Social History Narrative    Not on file     Social Determinants of Health     Financial Resource Strain: Low Risk     Difficulty of Paying Living Expenses: Not hard at all   Food Insecurity: No Food Insecurity    Worried About [facility] in the Last Year: Never true    [facility] in the Last Year: Never true   Transportation Needs: No Transportation Needs    Lack of Transportation (Medical): No    Lack of Transportation (Non-Medical): No   Physical Activity: Insufficiently Active    Days of Exercise per Week: 2 days    Minutes of Exercise per Session: 10 min   Stress: No Stress Concern Present    Feeling of Stress : Only a little   Social Connections: Moderately Integrated    Frequency of Communication with Friends and Family: Twice a week    Frequency of Social Gatherings with Friends and Family: More than three times a week    Attends [employer] Services: More than 4 times per year    Active Member of [employer] or Organizations:  Yes    Attends Club or Organization Meetings: 1 to 4 times per year    Marital Status:    Intimate Partner Violence: Not At Risk    Fear of Current or Ex-Partner: No    Emotionally Abused: No    Physically Abused: No    Sexually Abused: No     Family History   Problem Relation Age of Onset    Depression Mother     Diabetes Mother     Arthritis Father     Bipolar disorder Son     Anxiety disorder Son     Depression Son     Anxiety disorder Daughter     Bipolar disorder Daughter     Depression Daughter      No Known Allergies    Objective     Vitals:    07/08/21 1313   BP: 130/76   BP Location: Left arm   Patient Position: Sitting   Cuff Size: Large   Pulse: 90   Temp: (!) 97 4 °F (36 3 °C)   TempSrc: Temporal   Weight: 83 2 kg (183 lb 6 4 oz)       Physical exam:     GENERAL: NAD, obese habitus  HEENT:  Positive for erythema per skin exam below  PERRL, EOMI, no scleral icterus  CARDIAC:  RRR, +S1/S2, no S3/S4 heard, no m/g/r  PULMONARY:  CTA B/L, no wheezing/rales/rhonci, non-labored breathing  ABDOMEN:  Soft, NT/ND,no rebound/guarding/rigidity  Extremities:  No edema, cyanosis, or clubbing  No plaques or other abnormalities noted along the fingernail beds  NEUROLOGIC: Grossly intact  Musculoskeletal strength is 5/5 in all extremities  Reflexes intact and within normal limits  SKIN:  Scalp is symmetrically and diffusely erythematous, flaking  There are no raised or silvery plaques  There is no swelling  There is no drainage or lu trauma  Left pinna has some erythema and flaking  Base of neck on the right side on both anterior and posterior surfaces has some erythema without flaking, plaques  Posterior neck midline has small 1 cm lacerations consistent with fingernail marks  They are clean, dry, intact  Right palmar surface has an approximate 0 5 x 0 5 cm scaly erythematous plaque along base of the thumb  Psych: Normal affect        ==    Nevaeh Lopez MD  PGY-1 Internal Medicine    PLEASE NOTE:  This encounter was completed utilizing the [employer] Direct Speech Voice Recognition Software  Grammatical errors, random word insertions, pronoun errors and incomplete sentences are occasional consequences of the system due to software limitations, ambient noise and hardware issues  These may be missed by proof reading prior to affixing electronic signature   Any questions or concerns about the content, text or information contained within the body of this dictation should be directly addressed to the physician for clarification  Please do not hesitate to call me directly if you have any any questions or concerns

## 2021-07-17 DIAGNOSIS — L24.9 IRRITANT CONTACT DERMATITIS, UNSPECIFIED TRIGGER: ICD-10-CM

## 2021-07-19 RX ORDER — DIAPER,BRIEF,INFANT-TODD,DISP
EACH MISCELLANEOUS 2 TIMES DAILY PRN
Qty: 28.4 G | Refills: 2 | Status: SHIPPED | OUTPATIENT
Start: 2021-07-19 | End: 2021-10-04

## 2021-07-31 ENCOUNTER — APPOINTMENT (OUTPATIENT)
Dept: LAB | Facility: CLINIC | Age: 61
End: 2021-07-31
Payer: COMMERCIAL

## 2021-07-31 DIAGNOSIS — Z11.4 SCREENING FOR HIV (HUMAN IMMUNODEFICIENCY VIRUS): ICD-10-CM

## 2021-07-31 DIAGNOSIS — R73.03 PRE-DIABETES: ICD-10-CM

## 2021-07-31 LAB
CHOLEST SERPL-MCNC: 128 MG/DL (ref 50–200)
EST. AVERAGE GLUCOSE BLD GHB EST-MCNC: 114 MG/DL
HBA1C MFR BLD: 5.6 %
HDLC SERPL-MCNC: 55 MG/DL
LDLC SERPL CALC-MCNC: 63 MG/DL (ref 0–100)
TRIGL SERPL-MCNC: 52 MG/DL

## 2021-07-31 PROCEDURE — 80061 LIPID PANEL: CPT

## 2021-07-31 PROCEDURE — 83036 HEMOGLOBIN GLYCOSYLATED A1C: CPT

## 2021-07-31 PROCEDURE — 36415 COLL VENOUS BLD VENIPUNCTURE: CPT

## 2021-07-31 PROCEDURE — 87389 HIV-1 AG W/HIV-1&-2 AB AG IA: CPT

## 2021-08-02 LAB — HIV 1+2 AB+HIV1 P24 AG SERPL QL IA: NORMAL

## 2021-08-19 DIAGNOSIS — L21.0 DANDRUFF: ICD-10-CM

## 2021-08-19 RX ORDER — SELENIUM SULFIDE 2.5 MG/100ML
LOTION TOPICAL DAILY
Qty: 420 ML | Refills: 0 | Status: SHIPPED | OUTPATIENT
Start: 2021-08-19 | End: 2021-10-27 | Stop reason: SDUPTHER

## 2021-10-04 DIAGNOSIS — L24.9 IRRITANT CONTACT DERMATITIS, UNSPECIFIED TRIGGER: ICD-10-CM

## 2021-10-04 RX ORDER — DIAPER,BRIEF,INFANT-TODD,DISP
EACH MISCELLANEOUS 2 TIMES DAILY PRN
Qty: 28.4 G | Refills: 2 | Status: SHIPPED | OUTPATIENT
Start: 2021-10-04 | End: 2021-12-20

## 2021-10-27 ENCOUNTER — OFFICE VISIT (OUTPATIENT)
Dept: INTERNAL MEDICINE CLINIC | Facility: CLINIC | Age: 61
End: 2021-10-27

## 2021-10-27 ENCOUNTER — TELEPHONE (OUTPATIENT)
Dept: INTERNAL MEDICINE CLINIC | Facility: CLINIC | Age: 61
End: 2021-10-27

## 2021-10-27 VITALS
DIASTOLIC BLOOD PRESSURE: 67 MMHG | HEART RATE: 82 BPM | WEIGHT: 175.6 LBS | TEMPERATURE: 98.2 F | BODY MASS INDEX: 34.47 KG/M2 | OXYGEN SATURATION: 98 % | HEIGHT: 60 IN | SYSTOLIC BLOOD PRESSURE: 112 MMHG

## 2021-10-27 DIAGNOSIS — L21.9 SEBORRHEIC DERMATITIS OF SCALP: ICD-10-CM

## 2021-10-27 DIAGNOSIS — M54.42 ACUTE MIDLINE LOW BACK PAIN WITH LEFT-SIDED SCIATICA: Primary | ICD-10-CM

## 2021-10-27 DIAGNOSIS — L21.9 SEBORRHEIC DERMATITIS OF SCALP: Primary | ICD-10-CM

## 2021-10-27 DIAGNOSIS — L21.0 DANDRUFF: ICD-10-CM

## 2021-10-27 PROCEDURE — 99214 OFFICE O/P EST MOD 30 MIN: CPT | Performed by: INTERNAL MEDICINE

## 2021-10-27 RX ORDER — ZOLPIDEM TARTRATE 10 MG/1
10 TABLET ORAL
COMMUNITY
Start: 2021-10-18

## 2021-10-27 RX ORDER — SELENIUM SULFIDE 2.5 MG/100ML
LOTION TOPICAL DAILY
Qty: 420 ML | Refills: 0 | Status: SHIPPED | OUTPATIENT
Start: 2021-10-27 | End: 2021-11-05

## 2021-10-27 RX ORDER — ZOLPIDEM TARTRATE 5 MG/1
TABLET ORAL
COMMUNITY
Start: 2021-09-03 | End: 2021-10-27 | Stop reason: DRUGHIGH

## 2021-10-27 RX ORDER — AMCINONIDE 1 MG/G
LOTION TOPICAL 2 TIMES DAILY
Qty: 60 ML | Refills: 0 | Status: SHIPPED | OUTPATIENT
Start: 2021-10-27

## 2021-10-27 RX ORDER — KETOCONAZOLE 20 MG/ML
SHAMPOO TOPICAL
Qty: 120 ML | Refills: 1 | Status: SHIPPED | OUTPATIENT
Start: 2021-10-27 | End: 2021-11-18

## 2021-10-28 ENCOUNTER — TELEPHONE (OUTPATIENT)
Dept: INTERNAL MEDICINE CLINIC | Facility: CLINIC | Age: 61
End: 2021-10-28

## 2021-11-02 RX ORDER — BETAMETHASONE VALERATE 0.1 %
LOTION (ML) TOPICAL 2 TIMES DAILY
Qty: 60 ML | Refills: 0 | Status: SHIPPED | OUTPATIENT
Start: 2021-11-02

## 2021-11-04 DIAGNOSIS — L21.0 DANDRUFF: ICD-10-CM

## 2021-11-05 RX ORDER — SELENIUM SULFIDE 2.5 MG/100ML
LOTION TOPICAL
Qty: 420 ML | Refills: 0 | Status: SHIPPED | OUTPATIENT
Start: 2021-11-05 | End: 2022-01-10

## 2021-11-16 DIAGNOSIS — L21.0 DANDRUFF: ICD-10-CM

## 2021-11-18 RX ORDER — KETOCONAZOLE 20 MG/ML
SHAMPOO TOPICAL
Qty: 120 ML | Refills: 1 | Status: SHIPPED | OUTPATIENT
Start: 2021-11-18

## 2021-12-18 DIAGNOSIS — L24.9 IRRITANT CONTACT DERMATITIS, UNSPECIFIED TRIGGER: ICD-10-CM

## 2021-12-20 RX ORDER — DIAPER,BRIEF,INFANT-TODD,DISP
EACH MISCELLANEOUS 2 TIMES DAILY PRN
Qty: 28.4 G | Refills: 2 | Status: SHIPPED | OUTPATIENT
Start: 2021-12-20 | End: 2022-02-08

## 2022-01-10 DIAGNOSIS — L21.0 DANDRUFF: ICD-10-CM

## 2022-01-10 RX ORDER — SELENIUM SULFIDE 2.5 MG/100ML
LOTION TOPICAL
Qty: 360 ML | Refills: 1 | Status: SHIPPED | OUTPATIENT
Start: 2022-01-10 | End: 2022-02-09 | Stop reason: ALTCHOICE

## 2022-02-08 DIAGNOSIS — L24.9 IRRITANT CONTACT DERMATITIS, UNSPECIFIED TRIGGER: ICD-10-CM

## 2022-02-08 RX ORDER — DIAPER,BRIEF,INFANT-TODD,DISP
EACH MISCELLANEOUS 2 TIMES DAILY PRN
Qty: 28.4 G | Refills: 2 | Status: SHIPPED | OUTPATIENT
Start: 2022-02-08 | End: 2022-04-26

## 2022-02-09 ENCOUNTER — OFFICE VISIT (OUTPATIENT)
Dept: FAMILY MEDICINE CLINIC | Facility: CLINIC | Age: 62
End: 2022-02-09
Payer: COMMERCIAL

## 2022-02-09 VITALS
OXYGEN SATURATION: 98 % | TEMPERATURE: 98 F | BODY MASS INDEX: 35.03 KG/M2 | HEART RATE: 91 BPM | DIASTOLIC BLOOD PRESSURE: 88 MMHG | WEIGHT: 176.38 LBS | SYSTOLIC BLOOD PRESSURE: 130 MMHG

## 2022-02-09 DIAGNOSIS — Z00.00 ANNUAL PHYSICAL EXAM: Primary | ICD-10-CM

## 2022-02-09 DIAGNOSIS — F32.4 MAJOR DEPRESSIVE DISORDER IN PARTIAL REMISSION, UNSPECIFIED WHETHER RECURRENT (HCC): ICD-10-CM

## 2022-02-09 DIAGNOSIS — L21.0 DANDRUFF: ICD-10-CM

## 2022-02-09 DIAGNOSIS — Z13.1 SCREENING FOR DIABETES MELLITUS: ICD-10-CM

## 2022-02-09 DIAGNOSIS — Z13.220 SCREENING FOR LIPID DISORDERS: ICD-10-CM

## 2022-02-09 LAB — SL AMB POCT HEMOGLOBIN AIC: 6 (ref ?–6.5)

## 2022-02-09 PROCEDURE — 99396 PREV VISIT EST AGE 40-64: CPT

## 2022-02-09 PROCEDURE — 83036 HEMOGLOBIN GLYCOSYLATED A1C: CPT

## 2022-02-09 NOTE — PATIENT INSTRUCTIONS
Puedes carlie 400 mg de ibuprofen o naproxen con 650 mg de tylenol para el dolor de la espalda  Trata de mantener la piel hidratada  Wellness Visit for Adults   AMBULATORY CARE:   A wellness visit  is when you see your healthcare provider to get screened for health problems  Your healthcare provider will also give you advice on how to stay healthy  Write down your questions so you remember to ask them  Ask your healthcare provider how often you should have a wellness visit  What happens at a wellness visit:  Your healthcare provider will ask about your health, and your family history of health problems  This includes high blood pressure, heart disease, and cancer  He or she will ask if you have symptoms that concern you, if you smoke, and about your mood  You may also be asked about your intake of medicines, supplements, food, and alcohol  Any of the following may be done:  · Your weight  will be checked  Your height may also be checked so your body mass index (BMI) can be calculated  Your BMI shows if you are at a healthy weight  · Your blood pressure  and heart rate will be checked  Your temperature may also be checked  · Blood and urine tests  may be done  Blood tests may be done to check your cholesterol levels  Abnormal cholesterol levels increase your risk for heart disease and stroke  You may also need a blood or urine test to check for diabetes if you are at increased risk  Urine tests may be done to look for signs of an infection or kidney disease  · A physical exam  includes checking your heartbeat and lungs with a stethoscope  Your healthcare provider may also check your skin to look for sun damage  · Screening tests  may be recommended  A screening test is done to check for diseases that may not cause symptoms  The screening tests you may need depend on your age, gender, family history, and lifestyle habits   For example, colorectal screening may be recommended if you are 48years old or older     Screening tests you need if you are a woman:   · A Pap smear  is used to screen for cervical cancer  Pap smears are usually done every 3 to 5 years depending on your age  You may need them more often if you have had abnormal Pap smear test results in the past  Ask your healthcare provider how often you should have a Pap smear  · A mammogram  is an x-ray of your breasts to screen for breast cancer  Experts recommend mammograms every 2 years starting at age 48 years  You may need a mammogram at age 52 years or younger if you have an increased risk for breast cancer  Talk to your healthcare provider about when you should start having mammograms and how often you need them  Vaccines you may need:   · Get an influenza vaccine  every year  The influenza vaccine protects you from the flu  Several types of viruses cause the flu  The viruses change over time, so new vaccines are made each year  · Get a tetanus-diphtheria (Td) booster vaccine  every 10 years  This vaccine protects you against tetanus and diphtheria  Tetanus is a severe infection that may cause painful muscle spasms and lockjaw  Diphtheria is a severe bacterial infection that causes a thick covering in the back of your mouth and throat  · Get a human papillomavirus (HPV) vaccine  if you are female and aged 23 to 32 or male 23 to 24 and never received it  This vaccine protects you from HPV infection  HPV is the most common infection spread by sexual contact  HPV may also cause vaginal, penile, and anal cancers  · Get a pneumococcal vaccine  if you are aged 72 years or older  The pneumococcal vaccine is an injection given to protect you from pneumococcal disease  Pneumococcal disease is an infection caused by pneumococcal bacteria  The infection may cause pneumonia, meningitis, or an ear infection  · Get a shingles vaccine  if you are 60 or older, even if you have had shingles before   The shingles vaccine is an injection to protect you from the varicella-zoster virus  This is the same virus that causes chickenpox  Shingles is a painful rash that develops in people who had chickenpox or have been exposed to the virus  How to eat healthy:  My Plate is a model for planning healthy meals  It shows the types and amounts of foods that should go on your plate  Fruits and vegetables make up about half of your plate, and grains and protein make up the other half  A serving of dairy is included on the side of your plate  The amount of calories and serving sizes you need depends on your age, gender, weight, and height  Examples of healthy foods are listed below:  · Eat a variety of vegetables  such as dark green, red, and orange vegetables  You can also include canned vegetables low in sodium (salt) and frozen vegetables without added butter or sauces  · Eat a variety of fresh fruits , canned fruit in 100% juice, frozen fruit, and dried fruit  · Include whole grains  At least half of the grains you eat should be whole grains  Examples include whole-wheat bread, wheat pasta, brown rice, and whole-grain cereals such as oatmeal     · Eat a variety of protein foods such as seafood (fish and shellfish), lean meat, and poultry without skin (turkey and chicken)  Examples of lean meats include pork leg, shoulder, or tenderloin, and beef round, sirloin, tenderloin, and extra lean ground beef  Other protein foods include eggs and egg substitutes, beans, peas, soy products, nuts, and seeds  · Choose low-fat dairy products such as skim or 1% milk or low-fat yogurt, cheese, and cottage cheese  · Limit unhealthy fats  such as butter, hard margarine, and shortening  Exercise:  Exercise at least 30 minutes per day on most days of the week  Some examples of exercise include walking, biking, dancing, and swimming  You can also fit in more physical activity by taking the stairs instead of the elevator or parking farther away from stores   Include muscle strengthening activities 2 days each week  Regular exercise provides many health benefits  It helps you manage your weight, and decreases your risk for type 2 diabetes, heart disease, stroke, and high blood pressure  Exercise can also help improve your mood  Ask your healthcare provider about the best exercise plan for you  General health and safety guidelines:   · Do not smoke  Nicotine and other chemicals in cigarettes and cigars can cause lung damage  Ask your healthcare provider for information if you currently smoke and need help to quit  E-cigarettes or smokeless tobacco still contain nicotine  Talk to your healthcare provider before you use these products  · Limit alcohol  A drink of alcohol is 12 ounces of beer, 5 ounces of wine, or 1½ ounces of liquor  · Lose weight, if needed  Being overweight increases your risk of certain health conditions  These include heart disease, high blood pressure, type 2 diabetes, and certain types of cancer  · Protect your skin  Do not sunbathe or use tanning beds  Use sunscreen with a SPF 15 or higher  Apply sunscreen at least 15 minutes before you go outside  Reapply sunscreen every 2 hours  Wear protective clothing, hats, and sunglasses when you are outside  · Drive safely  Always wear your seatbelt  Make sure everyone in your car wears a seatbelt  A seatbelt can save your life if you are in an accident  Do not use your cell phone when you are driving  This could distract you and cause an accident  Pull over if you need to make a call or send a text message  · Practice safe sex  Use latex condoms if are sexually active and have more than one partner  Your healthcare provider may recommend screening tests for sexually transmitted infections (STIs)  · Wear helmets, lifejackets, and protective gear  Always wear a helmet when you ride a bike or motorcycle, go skiing, or play sports that could cause a head injury   Wear protective equipment when you play sports  Wear a lifejacket when you are on a boat or doing water sports  © Copyright [employer] 2021 Information is for End User's use only and may not be sold, redistributed or otherwise used for commercial purposes  All illustrations and images included in CareNotes® are the copyrighted property of [employer]  or Ramakrishna Kennedy   The above information is an  only  It is not intended as medical advice for individual conditions or treatments  Talk to your doctor, nurse or pharmacist before following any medical regimen to see if it is safe and effective for you

## 2022-02-09 NOTE — PROGRESS NOTES
[address] [facility] [place]    NAME: Molly Strickland  AGE: 63 y o  SEX: female  : 1960     DATE: 2022     Assessment and Plan:     Problem List Items Addressed This Visit        Musculoskeletal and Integument    Dandruff     · Trial of pyrithione zinc  · Close follow up         Relevant Medications    pyrithione zinc (HEAD AND SHOULDERS) 1 % shampoo       Other    Depression     Follows Psychiatry  Tolerating current regimen well  Continue Ambien, Paxil           Annual physical exam - Primary    Relevant Orders    POCT hemoglobin A1c (Completed)    Screening for diabetes mellitus    Relevant Orders    POCT hemoglobin A1c (Completed)    Screening for lipid disorders    Relevant Orders    Lipid Panel with Direct LDL reflex          Immunizations and preventive care screenings were discussed with patient today  Appropriate education was printed on patient's after visit summary  Counseling:  · Alcohol/drug use: discussed moderation in alcohol intake, the recommendations for healthy alcohol use, and avoidance of illicit drug use  Return in 4 weeks (on 3/9/2022) for Recheck lipid panel  Chief Complaint:     Chief Complaint   Patient presents with    Establish Care      History of Present Illness:     Adult Annual Physical   Patient here for a comprehensive physical exam  The patient reports arthritis, back pain, matitis de borreca, chronic R leg pain, hx bursitis  Takes tylenol which helps, as well as rest    No other medications  C/o itchy head and chest  Concerned about dermatitis? Does not improve with lotion (unknown brand)  Tried Selenium Sulfate but did not help  Also tried hydrocortisone  Due for mammo  Has an appointment this Saturday  Takes Paxil and ambien  Takes Paxil for bipolar and depression  Sees psych every 2 months  Had colonoscopy 2 years ago      Diet and Physical Activity  · Diet/Nutrition: well balanced diet, consuming 3-5 servings of fruits/vegetables daily, adequate fiber intake and adequate whole grain intake  · Exercise: walking, 5-7 times a week on average and less than 30 minutes on average  Depression Screening  PHQ-2/9 Depression Screening         General Health  · Sleep: gets 7-8 hours of sleep on average and snores loudly  · Hearing: normal - bilateral   · Vision: no vision problems  · Dental: regular dental visits  /GYN Health  · Patient is: postmenopausal  · Last menstrual period: 4 years ago  · Contraceptive method: no need; not currently sexually active  Review of Systems:     Review of Systems   Constitutional: Negative for chills and fever  HENT: Negative for ear pain and sore throat  Eyes: Negative for pain and visual disturbance  Respiratory: Negative for cough and shortness of breath  Cardiovascular: Negative for chest pain and palpitations  Gastrointestinal: Negative for abdominal pain and vomiting  Genitourinary: Negative for dysuria and hematuria  Musculoskeletal: Negative for arthralgias and back pain  Skin: Negative for color change and rash  Neurological: Negative for seizures and syncope  All other systems reviewed and are negative       Past Medical History:     Past Medical History:   Diagnosis Date    Anemia     Anxiety disorder     Bipolar disorder ([facility] [address] )     Psychiatric disorder       Past Surgical History:     Past Surgical History:   Procedure Laterality Date     SECTION      , ,    Jack Lal DENTAL SURGERY      TUBAL LIGATION        Social History:     Social History     Socioeconomic History    Marital status: /Civil Union     Spouse name: Not on file    Number of children: Not on file    Years of education: Not on file    Highest education level: Not on file   Occupational History    Not on file   Tobacco Use    Smoking status: Never Smoker    Smokeless tobacco: Never Used   Vaping Use    Vaping Use: Never used   Substance and Sexual Activity    Alcohol use: No    Drug use: No    Sexual activity: Not Currently     Partners: Male     Birth control/protection: Condom Female   Other Topics Concern    Not on file   Social History Narrative    Not on file     Social Determinants of Health     Financial Resource Strain: Low Risk     Difficulty of Paying Living Expenses: Not hard at all   Food Insecurity: No Food Insecurity    Worried About Running Out of Food in the Last Year: Never true    Yue of Food in the Last Year: Never true   Transportation Needs: No Transportation Needs    Lack of Transportation (Medical): No    Lack of Transportation (Non-Medical): No   Physical Activity: Insufficiently Active    Days of Exercise per Week: 2 days    Minutes of Exercise per Session: 10 min   Stress: No Stress Concern Present    Feeling of Stress : Only a little   Social Connections: Moderately Integrated    Frequency of Communication with Friends and Family: Twice a week    Frequency of Social Gatherings with Friends and Family: More than three times a week    Attends Baptism Services: More than 4 times per year    Active Member of [employer] or Organizations:  Yes    Attends Club or Organization Meetings: 1 to 4 times per year    Marital Status:    Intimate Partner Violence: Not At Risk    Fear of Current or Ex-Partner: No    Emotionally Abused: No    Physically Abused: No    Sexually Abused: No   Housing Stability: Not on file      Family History:     Family History   Problem Relation Age of Onset    Depression Mother     Diabetes Mother     Arthritis Father     Bipolar disorder Son     Anxiety disorder Son     Depression Son     Anxiety disorder Daughter     Bipolar disorder Daughter     Depression Daughter       Current Medications:     Current Outpatient Medications   Medication Sig Dispense Refill    amcinonide (CYCLOCORT) 0 1 % lotion Apply topically 2 (two) times a day Please use 2 times a day, preferably after you have showered and before bed, and spread evenly across the scalp  Please use this in combination with your medicated shampoos  This steroid lotion should be used for 3 weeks every day  60 mL 0    Blood Pressure KIT by Does not apply route daily 1 each 0    hydrocortisone 1 % cream APPLY TOPICALLY 2 (TWO) TIMES A DAY AS NEEDED (FOR NECK SKIN IRRITATION) 28 4 g 2    PARoxetine (PAXIL) 30 mg tablet Take 30 mg by mouth daily at bedtime      zolpidem (AMBIEN) 10 mg tablet Take 10 mg by mouth daily at bedtime as needed      betamethasone valerate (VALISONE) 0 1 % lotion Apply topically 2 (two) times a day (Patient not taking: Reported on 2/9/2022 ) 60 mL 0    FLUARIX QUADRIVALENT 0 5 ML IVÁN Use as directed (Patient not taking: Reported on 7/8/2021)  0    ketoconazole (NIZORAL) 2 % shampoo APPLY ONCE A DAY FOR 2 WEEKS FOR SCALP DANDRUFF (Patient not taking: Reported on 2/9/2022) 120 mL 1    Misc  Devices (SITZ BATH) MISC by Does not apply route (Patient not taking: Reported on 7/8/2021)      pyrithione zinc (HEAD AND SHOULDERS) 1 % shampoo Apply topically daily as needed for dandruff 100 mL 3    Skin Protectants, Misc  (eucerin) cream Apply topically as needed for wound care (Patient not taking: Reported on 7/8/2021) 397 g 0     No current facility-administered medications for this visit  Allergies:     No Known Allergies   Physical Exam:     /88 (BP Location: Right arm, Patient Position: Sitting, Cuff Size: Extra-Large)   Pulse 91   Temp 98 °F (36 7 °C) (Temporal)   Wt 80 kg (176 lb 6 oz)   LMP  (LMP Unknown)   SpO2 98%   BMI 35 03 kg/m²     Physical Exam  Vitals and nursing note reviewed  Constitutional:       General: She is not in acute distress  Appearance: She is well-developed  HENT:      Head: Normocephalic and atraumatic        Right Ear: Tympanic membrane and external ear normal       Left Ear: Tympanic membrane and external ear normal  Nose: No congestion or rhinorrhea  Mouth/Throat:      Mouth: Mucous membranes are moist       Pharynx: Oropharynx is clear  Eyes:      Conjunctiva/sclera: Conjunctivae normal    Cardiovascular:      Rate and Rhythm: Normal rate and regular rhythm  Pulses: Normal pulses  Heart sounds: Normal heart sounds  No murmur heard  Pulmonary:      Effort: Pulmonary effort is normal  No respiratory distress  Breath sounds: Normal breath sounds  Abdominal:      Palpations: Abdomen is soft  Tenderness: There is no abdominal tenderness  Musculoskeletal:         General: No swelling, tenderness or signs of injury  Cervical back: Neck supple  Skin:     General: Skin is warm and dry  Capillary Refill: Capillary refill takes less than 2 seconds  Neurological:      General: No focal deficit present  Mental Status: She is alert and oriented to person, place, and time  Cranial Nerves: No cranial nerve deficit  Motor: No weakness     Psychiatric:         Mood and Affect: Mood normal          Behavior: Behavior normal           Katlyn Rodriguez MD  [facility] [address] [facility]

## 2022-02-12 ENCOUNTER — HOSPITAL ENCOUNTER (OUTPATIENT)
Dept: MAMMOGRAPHY | Facility: HOSPITAL | Age: 62
Discharge: HOME | End: 2022-02-12
Payer: COMMERCIAL

## 2022-02-12 DIAGNOSIS — Z12.31 ENCOUNTER FOR SCREENING MAMMOGRAM FOR MALIGNANT NEOPLASM OF BREAST: ICD-10-CM

## 2022-02-12 PROCEDURE — 77063 BREAST TOMOSYNTHESIS BI: CPT

## 2022-02-12 PROCEDURE — 77067 SCR MAMMO BI INCL CAD: CPT

## 2022-02-14 PROBLEM — L21.0 DANDRUFF: Status: ACTIVE | Noted: 2022-02-14

## 2022-02-14 PROBLEM — Z13.1 SCREENING FOR DIABETES MELLITUS: Status: ACTIVE | Noted: 2022-02-14

## 2022-02-14 PROBLEM — Z13.220 SCREENING FOR LIPID DISORDERS: Status: ACTIVE | Noted: 2022-02-14

## 2022-02-16 NOTE — RESULT ENCOUNTER NOTE
Please call patient to let her know that her mammogram is normal and there are no signs of cancer  Thank you      Elias Roberts MD   PGY-1 Internal Medicine  [facility]

## 2022-02-23 ENCOUNTER — TELEPHONE (OUTPATIENT)
Dept: FAMILY MEDICINE CLINIC | Facility: CLINIC | Age: 62
End: 2022-02-23

## 2022-02-23 NOTE — TELEPHONE ENCOUNTER
Patient's daughter called and stated that the insurance needs a CBT code in order to see if the lipid panel is covered by her insurance, [employer] [facility]  They asked if you can follow up with them at [phone] to provide the CBT code  Gatito Mejia can be reached at [phone] or her alt  Number [phone]  Please advise, thank you!

## 2022-03-23 ENCOUNTER — OFFICE VISIT (OUTPATIENT)
Dept: FAMILY MEDICINE CLINIC | Facility: CLINIC | Age: 62
End: 2022-03-23
Payer: COMMERCIAL

## 2022-03-23 VITALS
TEMPERATURE: 97.9 F | DIASTOLIC BLOOD PRESSURE: 80 MMHG | SYSTOLIC BLOOD PRESSURE: 120 MMHG | BODY MASS INDEX: 35.18 KG/M2 | OXYGEN SATURATION: 96 % | WEIGHT: 177.13 LBS | HEART RATE: 88 BPM

## 2022-03-23 DIAGNOSIS — M25.471 SWELLING OF ANKLE, RIGHT: ICD-10-CM

## 2022-03-23 DIAGNOSIS — K21.9 GASTROESOPHAGEAL REFLUX DISEASE WITHOUT ESOPHAGITIS: Primary | ICD-10-CM

## 2022-03-23 DIAGNOSIS — L40.9 PSORIASIS: ICD-10-CM

## 2022-03-23 DIAGNOSIS — M54.31 SCIATICA OF RIGHT SIDE: ICD-10-CM

## 2022-03-23 DIAGNOSIS — L21.0 DANDRUFF: ICD-10-CM

## 2022-03-23 PROCEDURE — 99213 OFFICE O/P EST LOW 20 MIN: CPT

## 2022-03-23 RX ORDER — FAMOTIDINE 20 MG/1
20 TABLET, FILM COATED ORAL 2 TIMES DAILY
Qty: 90 TABLET | Refills: 0 | Status: SHIPPED | OUTPATIENT
Start: 2022-03-23 | End: 2022-04-20

## 2022-03-23 RX ORDER — NAPROXEN 500 MG/1
500 TABLET ORAL 2 TIMES DAILY WITH MEALS
Qty: 90 TABLET | Refills: 0 | Status: SHIPPED | OUTPATIENT
Start: 2022-03-23 | End: 2022-04-20

## 2022-03-23 NOTE — PATIENT INSTRUCTIONS
Ciática   LO QUE NECESITA SABER:   La ciática es mc condición que causa dolor en el nervio ciático  El nervio ciático sale de la kike dorsal y corre por ambos lados de los glúteos  Luego baja por la parte posterior del muslo, la parte inferior de la pierna y el pie  El nervio ciático puede estar comprimido, [place], irritado o estirado  INSTRUCCIONES SOBRE EL JOAN HOSPITALARIA:   Medicamentos:  · AINEs (analgésicos antiinflamatorios no esteroides): Estos medicamentos disminuyen la inflamación y el dolor  Los AINEs se pueden obtener sin receta médica  Consulte con dang médico cuál medicamento es el adecuado para usted  Pregunte cuánto carlie y cuándo  Tómelos ramona se le indique  Cuando no se trinh de la [employer], los medicamentos antiinflamatorios no esteroides pueden causar sangrado estomacal o problemas renales  · Acetaminofeno: Amy medicamento disminuye el dolor  El acetaminofeno puede obtener sin receta médica  Pregunte cuánto carlie y cuándo  [address]  El acetaminofén puede causar daño en el hígado cuando no se marcy de forma correcta  · Relajantes musculares ayudan a reducir dolor y espasmos musculares  · [place] lynnette medicamentos ramona se le haya indicado  Consulte con dang médico si usted rylie que dang medicamento no le está ayudando o si presenta efectos secundarios  Infórmele si es alérgico a algún medicamento  Mantenga mc lista actualizada de los [place], las vitaminas y los productos herbales que marcy  Incluya los siguientes datos de los medicamentos: cantidad, frecuencia y motivo de administración  Traiga con usted la lista o los envases de las píldoras a lynnette citas de seguimiento  Lleve la lista de los medicamentos con usted en isaac de mc emergencia  Acuda a la consulta de control con dang médico según las indicaciones: Anote lynnette preguntas para que se acuerde de hacerlas layla lynnette visitas  El manejo de lynnette síntomas:  · Actividad: Reduzca lynnette actividades   No levante objetos pesados ni tuerza la espalda layla un mínimo de 6 semanas  Retome lentamente lynnette actividades acostumbradas  · Hielo: El hielo ayuda a disminuir la inflamación y el dolor  El hielo también puede contribuir a evitar el daño de los tejidos  Use mc compresa de hielo o ponga hielo triturado en mc bolsa de plástico  [place] con mc toalla y colóquela en la pierna por 15 a 20 minutos cada hora o ramona se le indique  · Calor: El calor ayuda a disminuir el dolor y los espasmos musculares  Aplíquese calor en el área lesionada layla 20 a 30 minutos cada 2 horas layla la cantidad de [employer] indiquen  · Fisioterapia: Es posible que deba atenderse con un fisioterapeuta para que le enseñe ejercicios para aumentar la fuerza y el movimiento y aliviar el dolor  Un terapeuta ocupacional le enseña habilidades para ayudarlo con lynnette actividades diarias  · Use los dispositivos de asistencia joe ramona le indiquen: Es posible que deba usar un soporte para la espalda, ramona mc Phyllis Delgado  Puede que necesite muletas, un bastón o un andador para disminuir la presión en los músculos de la parte inferior de la espalda y las piernas  Pregunte a dang médico por más [employer] dispositivos de asistencia y cómo se usan de forma correcta  Cuidados personales:  · Evite la presión en la espalda y las piernas: No  levante objetos pesados, ni esté de pie o sentado por períodos prolongados de tiempo  · Levante los objetos de Jane yang: Mantenga la Chace Ream y doble las rodillas para alzar un objeto  No doble ni tuerza la espalda cuando levante algo  · Mantenga un peso saludable: Consulte con dang médico cuánto debería pesar  Pida que le ayude a crear un plan para bajar de peso si usted tiene sobrepeso  · Ejercicio: Pídale a dang médico que le recomiende el programa de estiramiento, calentamiento y ejercicio más apropiado para usted      Comuníquese con dang médico si:  · Le duele la parte inferior de la espalda por la noche o cuando descansa  · Le duele la parte inferior de la espalda y se le adormece la pierna por debajo de la rodilla  · Tiene debilidad en mc pierna solamente  · Usted tiene preguntas o inquietudes acerca de dang condición o cuidado  Regrese a la demetri de emergencias si:  · Tiene dificultad para contener la orina o las evacuaciones intestinales  · Tiene debilidad en ambas piernas  · Pierde la sensación en la sam o los glúteos  © Copyright [employer] 2022 Information is for End User's use only and may not be sold, redistributed or otherwise used for commercial purposes  All illustrations and images included in CareNotes® are the copyrighted property of [employer]  or [address] [place] [address] es sólo para uso en educación  Dang intención no es darle un consejo médico sobre enfermedades o tratamientos  Colsulte con dang Edin Orona farmacéutico antes de seguir cualquier régimen médico para saber si es seguro y efectivo para usted  Soriasis   LO QUE NECESITA SABER:   La psoriasis es mc enfermedad de la piel a kimberly plazo en la cual las células de la piel crecen más rápidamente de lo normal  Amy crecimiento acelerado hace que las células se acumulen en la superficie de la piel  Se jorge a parches rojos en relieve cubiertos de escamas de color plateado en la piel  INSTRUCCIONES SOBRE EL JOAN HOSPITALARIA:   Medicamentos:  · Medicamento tópico: Estos ungüentos, cremas y pastas se aplican sobre la piel  ? Humectantes: [place] la piel al [employer] hidratada y combatir la sequedad  ? Esteroides: Amy medicamento podría ser administrado para disminuir inflamación  ? Vitamina D y retinoides: Son cremas de base vitamínica que se emplean para [address]  ? Antralina: Amy medicamento baja la hinchazón y disminuye el crecimiento excesivo de las células de la piel que jorge a las escamas      ? Ácido salicílico: Amy agente de exfoliación contribuye a disminuir la formación de escamas en la piel y el cuero cabelludo  ? Preparaciones de alquitrán: Estos medicamentos combaten la comezón, la formación de escamas y la inflamación  Se pueden obtener en forma de champú, crema o aceite de baño  · Medicamentos de administración oral: Se usan estos medicamentos para tratar los casos graves de psoriasis y se trinh por la boca  Incluyen los esteroides o los retinoides  Es posible que también incluyan los medicamentos que disminuyen el ritmo de crecimiento de las células de la piel o que afectan el sistema inmunológico     · [place] lynnette medicamentos ramona se le haya indicado  Consulte con dang médico si usted rylie que dang medicamento no le está ayudando o si presenta efectos secundarios  Infórmele si es alérgico a algún medicamento  Mantenga mc lista actualizada de los [employer], las vitaminas y los productos herbales que marcy  Incluya los siguientes datos de los medicamentos: cantidad, frecuencia y motivo de administración  Traiga con usted la lista o los envases de las píldoras a lynnette citas de seguimiento  Lleve la lista de los medicamentos con usted en isaac de mc emergencia  Programe mc alicja de seguimiento con dang médico o especialista, según lo indicado: Anote lynnette preguntas para que se acuerde de hacerlas layla lynnette visitas  Cuidados personales:  · Cuide dang piel: Aplíquese emolientes, lubricantes o cremas humectantes de forma regular en la piel  Aplíqueselos cuando dang piel todavía esté húmeda después de carlie un baño  Suspenda el uso de las cremas si provocan ardor o irritación en la piel  Use un jabón suave o aceites de baño para calmar la piel cuando se baña  · Proteja dang piel del sol: La exposición a los allen del sol layla breves períodos de tiempo puede ser buena para la psoriasis  Estar expuesto a los allen del sol layla mucho tiempo o quemarse la piel puede empeorar la psoriasis   Pregunte a dang dermatólogo o médico qué cantidad de sol es apropiada para usted  · Controle el estrés: El estrés puede ocasionar un ataque de psoriasis  Aprenda maneras saludables de controlar la tensión nerviosa, ramona ejercicios de respiración profunda o meditación  · Preste atención a posibles síntomas con los medicamentos nuevos o suplementos herbales: Algunos medicamentos, incluyendo los suplementos herbales, pueden causar un ataque de psoriasis  Pregunte si alguno de los medicamentos que marcy podrían estar empeorando dang psoriasis  Fíjese si dang piel presenta algún cambio siempre que tome lynnette medicamentos  · No fume: Fumar puede causar un ataque de psoriasis  Si usted fuma, nunca es demasiado tarde para dejar de hacerlo  Pida información para dejar de fumar  · Evite los desencadenantes: Las lesiones en la piel, el frío y el alto consumo de alcohol son otros factores que pueden desencadenar un ataque de psoriasis  Comuníquese con dang médico si:  · Jerre Guile  · Tiene fiebre  · Las placas de dang piel no mejoran o Leyla Yonathan  · No puede dormir debido a la comezón de dang piel  · Las placas de dang piel drenan pus o tienen costras blandas de color amarillo  · Usted tiene preguntas o inquietudes acerca de dang condición o cuidado  Regrese a la demetri de emergencias si:  · La psoriasis cubre repentinamente grandes áreas de dang cuerpo y se torna más dolorosa  © Copyright [employer] 2022 Information is for End User's use only and may not be sold, redistributed or otherwise used for commercial purposes  All illustrations and images included in CareNotes® are the copyrighted property of A D A [employer]  or [address] [place] [address] es sólo para uso en educación  Dang intención no es darle un consejo médico sobre enfermedades o tratamientos  Colsulte con dang Viva Hint farmacéutico antes de seguir cualquier régimen médico para saber si es seguro y efectivo para usted        Enfermedad por reflujo gastroesofágico (Stefanie Chu)   CUIDADO AMBULATORIO:   La enfermedad por reflujo gastroesofágico (ERGE) es el reflujo que ocurre más de 2 veces a la semana layla varias semanas  Reflujo significa que el ácido y los alimentos en el estómago regresan al esófago  La ERGE puede causar otros problemas de ty con el tiempo si no es tratada  Causas frecuentes de la ERGE: La ERGE a menudo ocurre porque el músculo inferior (esfínter) del esófago no elroy jamila  Amy esfínter normalmente se abre para dejar pasar los alimentos al [facility] [place]  Luego se elroy para [employer] y el ácido estomacal dentro del [place]  Si el esfínter no elroy jamila, el ácido y los alimentos regresan (reflujo) al esófago  Lo siguiente podría aumentar dang riesgo de la ERGE:  · Ciertos alimentos ramona picantes, chocolate, alimentos que contengan cafeína, menta y alimentos fritos  · Hernia hiatal    · Ciertos medicamentos ramona los antagonistas del calcio (utilizados para tratar la presión arterial nanette), medicamentos para la alergia, sedantes o antidepresivos    · [place], obesidad o esclerodermia    · Acostarse después de comer    · Beber alcohol o fumar cigarrillos    Signos y síntomas:  · Acidez (dolor con ardor en el pecho)    · Dolor después de las comidas que se extiende al marcel, la mandíbula o el hombro    · Dolor que se lewis cuando cambia de posición    · Sabor amargo o ácido en dang boca    · [place] Perone tos seca    · Dificultad para tragar o dolor al tragar    · Ronquera o dolor de garganta    · Eructos o hipo    · Sensación de llenura pronto después de empezar a comer    Llame al número de emergencias local (911 en los [place]) si:  · Usted siente dolor julia en el pecho y dificultad repentina para respirar  Busque atención médica de inmediato si:  · Tiene dificultad para respirar después de vomitar  · Tiene dificultad para tragar o dolor al tragar      · Evelina evacuaciones intestinales son de color sherry, con [place], o de apariencia alquitranada  · Dang vómito parece ramona café molido o contiene maría  Llame a dang médico o gastroenterólogo si:  · Usted siente Chung Robinson y no puede eructar o vomitar  · Vomita grandes cantidades, o vomita con frecuencia  · Usted pierde peso sin proponérselo  · Evelina síntomas empeoran o no mejoran con el tratamiento  · Usted tiene preguntas o inquietudes acerca de dang condición o cuidado  Tratamiento para la ERGE:  · Los medicamentos para disminuir el ácido [place] medicamentos también podrían usarse para ayudar a que dang esfínter esofágico inferior y dang estómago se contraigan (estrechen) más  · La cirugía se realiza para envolver la parte superior del estómago alrededor del esfínter esofágico  [place] fortalecerá el esfínter y prevendrá el reflujo  Control de la ERGE:      · No consuma alimentos o bebidas que puedan aumentar la [place]  Estos incluyen chocolate, menta, comidas fritas o grasosas, bebidas que contienen cafeína o bebidas gaseosas  Otros alimentos incluyen comidas picantes, cebollas, tomates y alimentos a base de tomate  No consuma alimentos y bebidas que puedan irritar dang esófago, ramona las frutas cítricas, los jugos y las bebidas alcohólicas  · No ingiera comidas abundantes  Cuando usted come [employer] a la vez, dang estómago necesita más ácido para digerirla  Consuma 6 comidas pequeñas al día en vez de 3 comidas grandes, y coma lentamente  No consuma alimentos entre 2 y 3 horas antes de [place]  · Eleve la cabecera de dang cama  Coloque bloques de 6 pulgadas debajo de la cabecera de la estructura de dang cama  También podría usar más mc almohada para apoyar dang candy y hombros mientras duerme  · Mantenga un peso saludable  Si usted tiene sobrepeso, la pérdida de peso podría ayudar a aliviar los síntomas de la [id number]  · No fume  Fumar debilita el esfínter esofágico inferior y Raphael el riesgo de [id number]   Pida información a dang médico si usted actualmente fuma y necesita ayuda para dejar de fumar  Los cigarrillos electrónicos o el tabaco sin humo igualmente contienen nicotina  Consulte con dang médico antes de [employer]  · No aplique presión sobre el abdomen  La presión empuja el ácido hacia el esófago  No use ropa que Danita alrededor de [employer]  No se agache  Doble las rodillas para recoger algo  Acuda a lynnette consultas de control con dang médico o gastroenterólogo según le indicaron: Anote lynnette preguntas para que se acuerde de hacerlas layla lynnette visitas  © Copyright [employer] 2022 Information is for End User's use only and may not be sold, redistributed or otherwise used for commercial purposes  All illustrations and images included in CareNotes® are the copyrighted property of A D A [employer]  or [address] [place] [address] es sólo para uso en educación  Dang intención no es darle un consejo médico sobre enfermedades o tratamientos  Colsulte con dang Tomas Mad farmacéutico antes de seguir cualquier régimen médico para saber si es seguro y efectivo para usted

## 2022-03-23 NOTE — PROGRESS NOTES
Family Medicine Office Visit  Brie Galicia 63 y o  female   MRN: [id number] : 1960  ENCOUNTER: 3/23/2022 2:32 PM    Assessment & Plan     This is a 63 y o  female who is here for followup of chronic conditions    Sciatica of right side  Present for several years, likely 2/2 obesity, mostly sedentary lifestyle  Improved in the past with naproxen however she ran out of this prescription    Plan:  OMT Referral  Restart naproxen daily as needed  Close follow-up  Recommend weight loss, regular exercise, and healthy diet  Consider PT Referral    Dandruff  Improved with trial of pyrithione zinc (head and shoulders)    Plan:  Continue daily use of H&S shampoo  F/u with derm for this and apparent psoriatic plaque    Psoriasis  Pt reports having this diagnosed several years ago but was lost to follow up  States that it is very itchy and occasionally bleeds  Active plaque located on L lateral scalp just posterior to ear  Plan:  Close follow up  Derm referral    Swelling of ankle, right  Some mild swelling at ankle joint  No significant tenderness or erythema  Can bear weight on this ankle  Chronically swollen  Has improved in the past with compression stockings  Plan:  Compression stockings  Close follow-up           Avoid processed foods   cycle  for 20 mins/day for 3-5 days/week  Stop physical activity and call office if you develop chest pain that is relieved by rest  If chest pain/sob/sweating not relieved by rest go to nearest emergency room  Follow-up in 4 weeks for sciatic pain, rash on scalp    Subjective     CC: Follow-up (recheck lipid panel)       HPI  Brie Galicia is a 63y o -year-old female who  has a past medical history of Anemia, Anxiety disorder, Bipolar disorder ([address] ), and Psychiatric disorder        Today she presents for c/o R-sided sciatica and psoriasis in her L lateral scalp  Takes tylenol for the leg pain but does not help   Naproxen did help her in the past however she ran out of her script  I explained to her that it's available over the counter  However I will send a prescription just in case her insurance covers it or gets her a discount  She is using her exercise bicysle 3x/week for 10-15 minutes for exercise and can tolerate this, however thinks she can do more with naproxen  Uses a cane at home to help her walk  Dandruff from last visit is better after trial of pyrithione zinc  She will continue this  However she allegedly has a diagnosis of psoriasis and this rash is concerning her because it is not going away  She has had other rashes in the past that self resolved  She is showing me this today  It is pruritic and occasionally bleeds  Review of Systems   Constitutional: Negative for chills and fever  HENT: Negative for ear pain and sore throat  Eyes: Negative for pain and visual disturbance  Respiratory: Negative for cough and shortness of breath  Cardiovascular: Negative for chest pain and palpitations  Gastrointestinal: Negative for abdominal pain and vomiting  Endocrine: Negative  Genitourinary: Negative for dysuria and hematuria  Musculoskeletal: Positive for back pain and gait problem  Negative for arthralgias  Skin: Negative for color change and rash  Allergic/Immunologic: Negative  Neurological: Negative for dizziness, seizures and syncope  Hematological: Negative  Psychiatric/Behavioral: Negative  All other systems reviewed and are negative  Past Medical History, Past Surgical History, Family History, and Social History were reviewed and updated today as appropriate  Objective   /80 (BP Location: Right arm, Patient Position: Sitting, Cuff Size: Large)   Pulse 88   Temp 97 9 °F (36 6 °C) (Temporal)   Wt 80 3 kg (177 lb 2 oz)   LMP  (LMP Unknown)   SpO2 96%   BMI 35 18 kg/m²     Physical Exam  Constitutional:       General: She is not in acute distress  Appearance: Normal appearance   She is not ill-appearing  HENT:      Head: Normocephalic and atraumatic  Right Ear: Tympanic membrane and external ear normal       Left Ear: Tympanic membrane and external ear normal       Mouth/Throat:      Mouth: Mucous membranes are moist       Pharynx: Oropharynx is clear  No oropharyngeal exudate  Eyes:      Extraocular Movements: Extraocular movements intact  Pupils: Pupils are equal, round, and reactive to light  Cardiovascular:      Rate and Rhythm: Normal rate and regular rhythm  Pulses: Normal pulses  Heart sounds: Normal heart sounds  Pulmonary:      Effort: Pulmonary effort is normal       Breath sounds: Normal breath sounds  No wheezing, rhonchi or rales  Abdominal:      General: Abdomen is flat  Bowel sounds are normal  There is no distension  Palpations: Abdomen is soft  Tenderness: There is no abdominal tenderness  Musculoskeletal:         General: Tenderness present  Skin:     General: Skin is warm and dry  Capillary Refill: Capillary refill takes less than 2 seconds  Findings: Erythema and rash present  Neurological:      General: No focal deficit present  Mental Status: She is alert and oriented to person, place, and time     Psychiatric:         Mood and Affect: Mood normal          Behavior: Behavior normal                 No Known Allergies    Health Maintenance     Health Maintenance   Topic Date Due    PT PLAN OF CARE  02/16/2019    Influenza Vaccine (1) 09/01/2021    COVID-19 Vaccine (3 - Booster for Pfizer series) 09/22/2021    Cervical Cancer Screening  03/13/2022    BMI: Followup Plan  10/27/2022    Annual Physical  02/09/2023    Breast Cancer Screening: Mammogram  02/12/2023    BMI: Adult  03/23/2023    Colorectal Cancer Screening  11/19/2024    DTaP,Tdap,and Td Vaccines (2 - Td or Tdap) 08/30/2026    HIV Screening  Completed    Hepatitis C Screening  Completed    Pneumococcal Vaccine: Pediatrics (0 to 5 Years) and At-Risk Patients (6 to 59 Years)  Aged Out    HIB Vaccine  Aged Out    Hepatitis B Vaccine  Aged Out    IPV Vaccine  Aged Out    Hepatitis A Vaccine  Aged Out    Meningococcal ACWY Vaccine  Aged Out    HPV Vaccine  Aged [employer] History   Administered Date(s) Administered    COVID-19 PFIZER VACCINE 0 3 ML IM 04/01/2021, 04/22/2021    INFLUENZA 11/02/2017, 09/15/2018    Influenza Quadrivalent Preservative Free 3 years and older IM 01/21/2016    Influenza, injectable, quadrivalent, preservative free 0 5 mL 09/15/2018    Influenza, recombinant, quadrivalent,injectable, preservative free 09/30/2020    Tdap 08/30/2016         Noam Richey MD   [address]  3/23/2022  2:32 PM    Parts of this note were dictated using M*Modal dictation software

## 2022-04-01 PROBLEM — M54.31 SCIATICA OF RIGHT SIDE: Status: ACTIVE | Noted: 2022-04-01

## 2022-04-01 PROBLEM — L40.9 PSORIASIS: Status: ACTIVE | Noted: 2022-04-01

## 2022-04-01 PROBLEM — K21.9 GASTROESOPHAGEAL REFLUX DISEASE WITHOUT ESOPHAGITIS: Status: ACTIVE | Noted: 2022-04-01

## 2022-04-01 PROBLEM — M25.471 SWELLING OF ANKLE, RIGHT: Status: ACTIVE | Noted: 2022-04-01

## 2022-04-01 NOTE — ASSESSMENT & PLAN NOTE
Present for several years, likely 2/2 obesity, mostly sedentary lifestyle   Improved in the past with naproxen however she ran out of this prescription    Plan:  OMT Referral  Restart naproxen daily as needed  Close follow-up  Recommend weight loss, regular exercise, and healthy diet  Consider PT Referral

## 2022-04-01 NOTE — ASSESSMENT & PLAN NOTE
Some mild swelling at ankle joint  No significant tenderness or erythema  Can bear weight on this ankle  Chronically swollen  Has improved in the past with compression stockings      Plan:  Compression stockings  Close follow-up

## 2022-04-01 NOTE — ASSESSMENT & PLAN NOTE
Pt reports having this diagnosed several years ago but was lost to follow up  States that it is very itchy and occasionally bleeds  Active plaque located on L lateral scalp just posterior to ear      Plan:  Close follow up  Derm referral

## 2022-04-01 NOTE — ASSESSMENT & PLAN NOTE
Improved with trial of pyrithione zinc (head and shoulders)    Plan:  Continue daily use of H&S shampoo  F/u with derm for this and apparent psoriatic plaque

## 2022-04-14 ENCOUNTER — PROCEDURE VISIT (OUTPATIENT)
Dept: FAMILY MEDICINE CLINIC | Facility: CLINIC | Age: 62
End: 2022-04-14
Payer: COMMERCIAL

## 2022-04-14 DIAGNOSIS — M99.05 SOMATIC DYSFUNCTION OF PELVIS REGION: ICD-10-CM

## 2022-04-14 DIAGNOSIS — M99.03 SOMATIC DYSFUNCTION OF LUMBAR REGION: ICD-10-CM

## 2022-04-14 DIAGNOSIS — M54.31 SCIATICA OF RIGHT SIDE: Primary | ICD-10-CM

## 2022-04-14 DIAGNOSIS — M99.02 SOMATIC DYSFUNCTION OF THORACIC REGION: ICD-10-CM

## 2022-04-14 PROCEDURE — 99213 OFFICE O/P EST LOW 20 MIN: CPT | Performed by: FAMILY MEDICINE

## 2022-04-14 NOTE — PROGRESS NOTES
The Assessment/Plan     This is a 63 y o  female who presents for OMT initial visit for:  1  Sciatica of right side     2  Somatic dysfunction of lumbar region     3  Somatic dysfunction of pelvis region     4  Somatic dysfunction of thoracic region          1  Patient tolerated OMT well for the above problems,  advised patient to drink fluids and can use NSAID for soreness after treatment     2  OMT Follow up in 2 weeks  Subjective     Henry Arthur is a 63 y o  female and is here for a OMT initial visit for sciatica  Per chart review and visit at the end of march:  "Present for several years, likely 2/2 obesity, mostly sedentary lifestyle  Improved in the past with naproxen however she ran out of this prescription"  She was then referred here for OMT  The patient reports some neck pain and lower back/sciatic pain  Is the patient taking Pain medication? yes  Has the patient completed physical therapy for this condition? no  Did Patient symptoms improve from last OMT appointment? n/a    The following portions of the patient's history were reviewed and updated as appropriate: allergies, current medications, past family history, past medical history, past social history, past surgical history and problem list     Review of Systems  Review of Systems   Constitutional: Negative for chills and fever  Respiratory: Negative for cough and shortness of breath  Gastrointestinal: Negative for abdominal pain, constipation, diarrhea, nausea and vomiting  Musculoskeletal: Positive for back pain and neck pain  Negative for joint swelling  Skin: Negative for rash and wound  Neurological: Negative for weakness and numbness  Objective     OMT Exam     OMT  Performed by: Jocelyn Smith DO  Authorized by: Jocelyn Smith DO   Universal Protocol:  Consent: Verbal consent obtained    Consent given by: patient  Patient understanding: patient states understanding of the procedure being performed  Patient identity confirmed: verbally with patient        Procedure Details:     Region evaluated and treated:  Cervical, Sacrum/Pelvis, Pelvis Innominate and Thoracic    Thoracic Information  Thoracic Region: T1 - T4  Cervical Details:     Examination Method:  Tissue Texture Change, Stability, Laxity, Effusions, Tone and Tenderness, Pain    Severity:  Mild    Osteopathic Findings:  Cervical paraspinal hypertonicity  tenderpoints in the cervical spine    Treatment Method:  Soft Tissue Treatment, Direct Treatment and Counterstrain Treatment    Response:  Improved - The somatic dysfunction is improved but not completely resolved  Thoracic T1 - T4 details:     Examination Method:  Tissue Texture Change, Stability, Laxity, Effusions, Tone and Tenderness, Pain    Severity:  Moderate    Osteopathic Findings:  Trapezius hypertonicity with bilateral tender points in the muscle belly    Treatment Method:  Direct Treatment, Counterstrain Treatment and Soft Tissue Treatment    Response:  Improved    Sacrum/Pelvis details:     Examination Method:  Tissue Texture Change, Stability, Laxity, Effusions, Tone and Tenderness, Pain    Severity:  Moderate    Osteopathic Findings:  R sided tenderpoint in piriformis muscle with some restricted ROM    Treatment Method:  Soft Tissue Treatment, Muscle Energy Treatment, Indirect Treatment and Direct Treatment    Response:  Improved - The somatic dysfunction is improved but not completely resolved  Pelvis Innominate details:     Examination Method:  Asymmetry, Misalignment, Crepitation, Defects, Masses and Tenderness, Pain    Severity:  Moderate    Osteopathic Findings:  R anterior pelvic innominate    Treatment Method:  Direct Treatment and Muscle Energy Treatment    Response:  Improved - The somatic dysfunction is improved but not completely resolved  Total Regions Treated:  4  Attending provider present in exam room for procedure:  No

## 2022-04-16 DIAGNOSIS — K21.9 GASTROESOPHAGEAL REFLUX DISEASE WITHOUT ESOPHAGITIS: ICD-10-CM

## 2022-04-16 DIAGNOSIS — M54.31 SCIATICA OF RIGHT SIDE: ICD-10-CM

## 2022-04-20 RX ORDER — NAPROXEN 500 MG/1
500 TABLET ORAL 2 TIMES DAILY WITH MEALS
Qty: 90 TABLET | Refills: 0 | Status: SHIPPED | OUTPATIENT
Start: 2022-04-20 | End: 2022-06-07

## 2022-04-20 RX ORDER — FAMOTIDINE 20 MG/1
20 TABLET, FILM COATED ORAL 2 TIMES DAILY
Qty: 90 TABLET | Refills: 0 | Status: SHIPPED | OUTPATIENT
Start: 2022-04-20 | End: 2022-06-07

## 2022-04-25 DIAGNOSIS — L24.9 IRRITANT CONTACT DERMATITIS, UNSPECIFIED TRIGGER: ICD-10-CM

## 2022-04-26 RX ORDER — DIAPER,BRIEF,INFANT-TODD,DISP
EACH MISCELLANEOUS 2 TIMES DAILY PRN
Qty: 28.4 G | Refills: 2 | Status: SHIPPED | OUTPATIENT
Start: 2022-04-26

## 2022-05-04 ENCOUNTER — OFFICE VISIT (OUTPATIENT)
Dept: FAMILY MEDICINE CLINIC | Facility: CLINIC | Age: 62
End: 2022-05-04
Payer: COMMERCIAL

## 2022-05-04 VITALS
DIASTOLIC BLOOD PRESSURE: 80 MMHG | SYSTOLIC BLOOD PRESSURE: 122 MMHG | BODY MASS INDEX: 34.36 KG/M2 | TEMPERATURE: 98.1 F | HEART RATE: 81 BPM | WEIGHT: 175 LBS | HEIGHT: 60 IN | OXYGEN SATURATION: 98 %

## 2022-05-04 DIAGNOSIS — M54.31 SCIATICA OF RIGHT SIDE: ICD-10-CM

## 2022-05-04 DIAGNOSIS — L21.0 DANDRUFF: Primary | ICD-10-CM

## 2022-05-04 DIAGNOSIS — L40.9 PSORIASIS: ICD-10-CM

## 2022-05-04 PROCEDURE — 99213 OFFICE O/P EST LOW 20 MIN: CPT

## 2022-05-04 NOTE — PATIENT INSTRUCTIONS
Dieta con alto contenido de [place]   CUIDADO AMBULATORIO:   [facility] dieta con alto contenido de fibra incluye alimentos con mc nanette cantidad de fibra  La fibra es la parte de las frutas, los vegetales y los granos, que no se descompone al ser ingerida por dang cuerpo  La fibra ayuda a regular las evacuaciones intestinales  La fibra además ayuda a bajar el colesterol, controlar la glucosa en la maría en las personas que sufren de diabetes y para aliviar el estreñimiento  También la fibra podría ayudarle a controlar dang peso debido a que le da la sensación de llenura más rápidamente  La mayoría de los adultos deberían consumir entre 25 a 35 gramos de fibra al día  Consulte con dang dietista o médico sobre la cantidad de fibra que usted necesita  Buenas lovelace de fibra:      · Alimentos que contienen al menos 4 gramos de fibra por porción:     ? ? a ½ de mc taza de cereal con alto contenido de fibra (tristan la etiqueta nutricional en la caja)    ? ½ taza de moras o frambuesas    ? 4 ciruelas pasas    ? 1 alcachofa cocida    ? ½ taza de legumbres cocidas, ramona lentejas o frijoles rojos o pintos    · [employer] contienen 1 a 3 gramos de [place] por porción:     ? 1 rebanada de pan de jason integral, pan integral de carmichael o pan de carmichael    ? ½ taza de arroz integral cocido    ? 4 galletas integrales    ? 1 taza de barbra    ? ½ taza de cereal con 1 a 3 gramos de fibra por porción (tristan la etiqueta nutricional en la caja)    ? 1 porción pequeña de fruta ramona manzana, banana, kaylin, kiwi o naranja    ? 3 dátiles    ? ½ taza de albaricoques enlatados, ensalada de frutas, duraznos o peras    ? ½ taza de verduras crudas o cocidas, ramona zanahorias, coliflor, repollo, espinaca, calabaza o maíz    Formas en que puede aumentar la fibra en dang dieta:  · Escoja arroz integral o melissa en vez de arroz fagan     · Use harina de grano integral en las recetas en vez de harina rebeka      · Maryella Chime y arvejas a los [facility] [address] sopas     · Real Juan Carlos y verduras frescas con la cascara en vez de jugos  Otras pautas dietéticas que debe seguir:  · Hildy Ivette a dang dieta lentamente  Usted podría presentar malestar o inflamación estomacal y gases si añade fibra a dang dieta demasiado rápido  · Urszula mucho líquido a medida que agrega fibra a dang dieta  Es posible que tenga náuseas o desarrolle estreñimiento si no marcy suficiente agua  Pregunte cuánto líquido debe carlie cada día y cuáles líquidos son los más adecuados para usted  © Copyright [employer] 2022 Information is for End User's use only and may not be sold, redistributed or otherwise used for commercial purposes  All illustrations and images included in CareNotes® are the copyrighted property of [employer]  or [address] [place] [address] es sólo para uso en educación  Dang intención no es darle un consejo médico sobre enfermedades o tratamientos  Colsulte con dang Ai Sravani farmacéutico antes de seguir cualquier régimen médico para saber si es seguro y efectivo para usted

## 2022-05-04 NOTE — PROGRESS NOTES
Family Medicine Office Visit  Asher Mcnally 63 y o  female   MRN: [id number] : 1960  ENCOUNTER: 2022 11:29 AM    Assessment & Plan     This is a 63 y o  female who is here for Follow-up      Sciatica of right side  Improved from last visit  Can continue naproxen as needed, and follow with DO for OMT  Psoriasis  F/U with dermatology    Dandruff  Much improved since last visit  Continue H&S shampoo  Prediabetes  Lab Results   Component Value Date    HGBA1C 6 0 2022     Provided information on dietary sources of fiber e g  fruits, vegetables, whole grains, and beans/lentils  Counseled on reducing intake of animal fats and processed sugars  Follow-up in 6 months for scheduled follow-up    Subjective     CC: Follow-up       HPI  Asher Mcnally is a 63y o -year-old female who  has a past medical history of Anemia, Anxiety disorder, Bipolar disorder ([address] ), and Psychiatric disorder        Today she presents for follow up of dandruff, psoriasis, sciatic pain  Visit was brief as patient had no money to put in the meter  No complaints today  Here for follow-up after OMT for sciatic pain  Had an OMT visit with Dr Iam Carvajal, sciatica is much better  She is interesting in following up regularly with her, and will schedule a visit in the future if the pain returns/worsens  Has an appointment for a dermatologist  Saw that her A1C came back at 6%, prediabetic  Gave patient information on eating foods higher in fiber, more fruits and vegetables  Review of Systems   Constitutional: Negative for chills and fever  HENT: Negative for ear pain and sore throat  Eyes: Negative for pain and visual disturbance  Respiratory: Negative for cough and shortness of breath  Cardiovascular: Negative for chest pain and palpitations  Gastrointestinal: Negative for abdominal pain and vomiting  Genitourinary: Negative for dysuria and hematuria  Musculoskeletal: Negative for arthralgias and back pain  Skin: Negative for color change and rash  Neurological: Negative for seizures and syncope  All other systems reviewed and are negative  Past Medical History, Past Surgical History, Family History, and Social History were reviewed and updated today as appropriate  Objective   /80   Pulse 81   Temp 98 1 °F (36 7 °C)   Ht 4' 11 5" (1 511 m)   Wt 79 4 kg (175 lb)   LMP  (LMP Unknown)   SpO2 98%   BMI 34 75 kg/m²     Physical Exam  Constitutional:       General: She is not in acute distress  Appearance: Normal appearance  She is not ill-appearing  HENT:      Head: Normocephalic and atraumatic  Right Ear: External ear normal       Left Ear: External ear normal       Mouth/Throat:      Mouth: Mucous membranes are moist       Pharynx: Oropharynx is clear  No oropharyngeal exudate  Eyes:      Extraocular Movements: Extraocular movements intact  Pupils: Pupils are equal, round, and reactive to light  Cardiovascular:      Rate and Rhythm: Normal rate and regular rhythm  Pulses: Normal pulses  Heart sounds: Normal heart sounds  Pulmonary:      Effort: Pulmonary effort is normal       Breath sounds: Normal breath sounds  No wheezing, rhonchi or rales  Abdominal:      General: Abdomen is flat  Bowel sounds are normal  There is no distension  Palpations: Abdomen is soft  Tenderness: There is no abdominal tenderness  Skin:     General: Skin is warm and dry  Capillary Refill: Capillary refill takes less than 2 seconds  Findings: Rash present  Comments: Dandruff much improved from prior  Psoriatic plaque still present  Per daughter, pt has appointment made with derm next month  Neurological:      General: No focal deficit present  Mental Status: She is alert and oriented to person, place, and time     Psychiatric:         Mood and Affect: Mood normal          Behavior: Behavior normal           Labs reviewed: HbA1c  Imaging reviewed: none    No Known Allergies    Health Maintenance     Health Maintenance   Topic Date Due    PT PLAN OF CARE  02/16/2019    COVID-19 Vaccine (3 - Booster for Pfizer series) 09/22/2021    Cervical Cancer Screening  03/13/2022    Influenza Vaccine (Season Ended) 09/01/2022    Annual Physical  02/09/2023    Breast Cancer Screening: Mammogram  02/12/2023    BMI: Followup Plan  05/04/2023    BMI: Adult  05/04/2023    Colorectal Cancer Screening  11/19/2024    DTaP,Tdap,and Td Vaccines (2 - Td or Tdap) 08/30/2026    HIV Screening  Completed    Hepatitis C Screening  Completed    Pneumococcal Vaccine: Pediatrics (0 to 5 Years) and At-Risk Patients (6 to 59 Years)  Aged Out    HIB Vaccine  Aged Out    Hepatitis B Vaccine  Aged Out    IPV Vaccine  Aged Out    Hepatitis A Vaccine  Aged Out    Meningococcal ACWY Vaccine  Aged Out    HPV Vaccine  Aged [employer] History   Administered Date(s) Administered    COVID-19 PFIZER VACCINE 0 3 ML IM 04/01/2021, 04/22/2021    INFLUENZA 11/02/2017, 09/15/2018    Influenza Quadrivalent Preservative Free 3 years and older IM 01/21/2016    Influenza, injectable, quadrivalent, preservative free 0 5 mL 09/15/2018    Influenza, recombinant, quadrivalent,injectable, preservative free 09/30/2020    Tdap 08/30/2016         Noam Richey MD   [address]  5/4/2022  11:29 AM    Parts of this note were dictated using [employer] dictation software

## 2022-05-10 PROBLEM — R73.03 PREDIABETES: Status: ACTIVE | Noted: 2022-05-10

## 2022-05-10 NOTE — ASSESSMENT & PLAN NOTE
Lab Results   Component Value Date    BA1C 6 0 02/09/2022     Provided information on dietary sources of fiber e g  fruits, vegetables, whole grains, and beans/lentils  Counseled on reducing intake of animal fats and processed sugars

## 2022-06-03 DIAGNOSIS — M54.31 SCIATICA OF RIGHT SIDE: ICD-10-CM

## 2022-06-03 DIAGNOSIS — K21.9 GASTROESOPHAGEAL REFLUX DISEASE WITHOUT ESOPHAGITIS: ICD-10-CM

## 2022-06-07 RX ORDER — FAMOTIDINE 20 MG/1
20 TABLET, FILM COATED ORAL 2 TIMES DAILY
Qty: 90 TABLET | Refills: 0 | Status: SHIPPED | OUTPATIENT
Start: 2022-06-07 | End: 2022-07-05

## 2022-06-07 RX ORDER — NAPROXEN 500 MG/1
500 TABLET ORAL 2 TIMES DAILY WITH MEALS
Qty: 90 TABLET | Refills: 0 | Status: SHIPPED | OUTPATIENT
Start: 2022-06-07

## 2022-07-05 DIAGNOSIS — K21.9 GASTROESOPHAGEAL REFLUX DISEASE WITHOUT ESOPHAGITIS: ICD-10-CM

## 2022-07-05 RX ORDER — FAMOTIDINE 20 MG/1
20 TABLET, FILM COATED ORAL 2 TIMES DAILY
Qty: 90 TABLET | Refills: 0 | Status: SHIPPED | OUTPATIENT
Start: 2022-07-05 | End: 2022-08-12

## 2022-08-12 DIAGNOSIS — K21.9 GASTROESOPHAGEAL REFLUX DISEASE WITHOUT ESOPHAGITIS: ICD-10-CM

## 2022-08-12 RX ORDER — FAMOTIDINE 20 MG/1
20 TABLET, FILM COATED ORAL 2 TIMES DAILY
Qty: 90 TABLET | Refills: 0 | Status: SHIPPED | OUTPATIENT
Start: 2022-08-12 | End: 2022-09-29

## 2022-08-20 ENCOUNTER — APPOINTMENT (OUTPATIENT)
Dept: LAB | Facility: CLINIC | Age: 62
End: 2022-08-20
Payer: COMMERCIAL

## 2022-08-20 DIAGNOSIS — Z79.899 ENCOUNTER FOR LONG-TERM (CURRENT) USE OF OTHER MEDICATIONS: ICD-10-CM

## 2022-08-20 LAB
ALBUMIN SERPL BCP-MCNC: 4.3 G/DL (ref 3.5–5)
ALP SERPL-CCNC: 47 U/L (ref 34–104)
ALT SERPL W P-5'-P-CCNC: 18 U/L (ref 7–52)
ANION GAP SERPL CALCULATED.3IONS-SCNC: 5 MMOL/L (ref 4–13)
AST SERPL W P-5'-P-CCNC: 18 U/L (ref 13–39)
BASOPHILS # BLD AUTO: 0.02 THOUSANDS/ΜL (ref 0–0.1)
BASOPHILS NFR BLD AUTO: 0 % (ref 0–1)
BILIRUB SERPL-MCNC: 0.53 MG/DL (ref 0.2–1)
BUN SERPL-MCNC: 13 MG/DL (ref 5–25)
CALCIUM SERPL-MCNC: 9.5 MG/DL (ref 8.4–10.2)
CHLORIDE SERPL-SCNC: 102 MMOL/L (ref 96–108)
CHOLEST SERPL-MCNC: 154 MG/DL
CO2 SERPL-SCNC: 29 MMOL/L (ref 21–32)
CREAT SERPL-MCNC: 0.51 MG/DL (ref 0.6–1.3)
EOSINOPHIL # BLD AUTO: 0.06 THOUSAND/ΜL (ref 0–0.61)
EOSINOPHIL NFR BLD AUTO: 1 % (ref 0–6)
ERYTHROCYTE [DISTWIDTH] IN BLOOD BY AUTOMATED COUNT: 12.3 % (ref 11.6–15.1)
EST. AVERAGE GLUCOSE BLD GHB EST-MCNC: 114 MG/DL
GFR SERPL CREATININE-BSD FRML MDRD: 105 ML/MIN/1.73SQ M
GLUCOSE P FAST SERPL-MCNC: 91 MG/DL (ref 65–99)
HBA1C MFR BLD: 5.6 %
HCT VFR BLD AUTO: 37.6 % (ref 34.8–46.1)
HDLC SERPL-MCNC: 54 MG/DL
HGB BLD-MCNC: 11.8 G/DL (ref 11.5–15.4)
IMM GRANULOCYTES # BLD AUTO: 0.02 THOUSAND/UL (ref 0–0.2)
IMM GRANULOCYTES NFR BLD AUTO: 0 % (ref 0–2)
LDLC SERPL CALC-MCNC: 82 MG/DL (ref 0–100)
LYMPHOCYTES # BLD AUTO: 0.99 THOUSANDS/ΜL (ref 0.6–4.47)
LYMPHOCYTES NFR BLD AUTO: 21 % (ref 14–44)
MCH RBC QN AUTO: 28.3 PG (ref 26.8–34.3)
MCHC RBC AUTO-ENTMCNC: 31.4 G/DL (ref 31.4–37.4)
MCV RBC AUTO: 90 FL (ref 82–98)
MONOCYTES # BLD AUTO: 0.35 THOUSAND/ΜL (ref 0.17–1.22)
MONOCYTES NFR BLD AUTO: 7 % (ref 4–12)
NEUTROPHILS # BLD AUTO: 3.29 THOUSANDS/ΜL (ref 1.85–7.62)
NEUTS SEG NFR BLD AUTO: 71 % (ref 43–75)
NONHDLC SERPL-MCNC: 100 MG/DL
NRBC BLD AUTO-RTO: 0 /100 WBCS
PLATELET # BLD AUTO: 230 THOUSANDS/UL (ref 149–390)
PMV BLD AUTO: 11.4 FL (ref 8.9–12.7)
POTASSIUM SERPL-SCNC: 4.5 MMOL/L (ref 3.5–5.3)
PROT SERPL-MCNC: 7.7 G/DL (ref 6.4–8.4)
RBC # BLD AUTO: 4.17 MILLION/UL (ref 3.81–5.12)
SODIUM SERPL-SCNC: 136 MMOL/L (ref 135–147)
TRIGL SERPL-MCNC: 90 MG/DL
TSH SERPL DL<=0.05 MIU/L-ACNC: 2.71 UIU/ML (ref 0.45–4.5)
WBC # BLD AUTO: 4.73 THOUSAND/UL (ref 4.31–10.16)

## 2022-08-20 PROCEDURE — 84443 ASSAY THYROID STIM HORMONE: CPT

## 2022-08-20 PROCEDURE — 80053 COMPREHEN METABOLIC PANEL: CPT

## 2022-08-20 PROCEDURE — 83036 HEMOGLOBIN GLYCOSYLATED A1C: CPT

## 2022-08-20 PROCEDURE — 84244 ASSAY OF RENIN: CPT

## 2022-08-20 PROCEDURE — 80061 LIPID PANEL: CPT

## 2022-08-20 PROCEDURE — 36415 COLL VENOUS BLD VENIPUNCTURE: CPT

## 2022-08-20 PROCEDURE — 85025 COMPLETE CBC W/AUTO DIFF WBC: CPT

## 2022-08-25 LAB — RENIN PLAS-CCNC: 0.52 NG/ML/HR (ref 0.17–5.38)

## 2022-09-06 ENCOUNTER — VBI (OUTPATIENT)
Dept: ADMINISTRATIVE | Facility: OTHER | Age: 62
End: 2022-09-06

## 2022-09-23 DIAGNOSIS — M54.31 SCIATICA OF RIGHT SIDE: ICD-10-CM

## 2022-09-23 RX ORDER — NAPROXEN 500 MG/1
500 TABLET ORAL 2 TIMES DAILY WITH MEALS
Qty: 90 TABLET | Refills: 0 | Status: SHIPPED | OUTPATIENT
Start: 2022-09-23 | End: 2022-10-12 | Stop reason: SDUPTHER

## 2022-09-29 DIAGNOSIS — K21.9 GASTROESOPHAGEAL REFLUX DISEASE WITHOUT ESOPHAGITIS: ICD-10-CM

## 2022-09-29 RX ORDER — FAMOTIDINE 20 MG/1
20 TABLET, FILM COATED ORAL 2 TIMES DAILY
Qty: 90 TABLET | Refills: 0 | Status: SHIPPED | OUTPATIENT
Start: 2022-09-29 | End: 2022-10-12 | Stop reason: SDUPTHER

## 2022-10-11 DIAGNOSIS — K21.9 GASTROESOPHAGEAL REFLUX DISEASE WITHOUT ESOPHAGITIS: ICD-10-CM

## 2022-10-11 DIAGNOSIS — M54.31 SCIATICA OF RIGHT SIDE: ICD-10-CM

## 2022-10-11 PROBLEM — Z13.220 SCREENING FOR LIPID DISORDERS: Status: RESOLVED | Noted: 2022-02-14 | Resolved: 2022-10-11

## 2022-10-12 RX ORDER — NAPROXEN 500 MG/1
500 TABLET ORAL 2 TIMES DAILY WITH MEALS
Qty: 90 TABLET | Refills: 0 | Status: SHIPPED | OUTPATIENT
Start: 2022-10-12

## 2022-10-12 RX ORDER — FAMOTIDINE 20 MG/1
20 TABLET, FILM COATED ORAL 2 TIMES DAILY
Qty: 90 TABLET | Refills: 0 | Status: SHIPPED | OUTPATIENT
Start: 2022-10-12 | End: 2022-10-26

## 2022-10-26 DIAGNOSIS — K21.9 GASTROESOPHAGEAL REFLUX DISEASE WITHOUT ESOPHAGITIS: ICD-10-CM

## 2022-10-26 RX ORDER — FAMOTIDINE 20 MG/1
20 TABLET, FILM COATED ORAL 2 TIMES DAILY
Qty: 90 TABLET | Refills: 0 | Status: SHIPPED | OUTPATIENT
Start: 2022-10-26

## 2022-11-15 ENCOUNTER — TELEPHONE (OUTPATIENT)
Dept: DERMATOLOGY | Facility: CLINIC | Age: 62
End: 2022-11-15

## 2022-11-15 NOTE — TELEPHONE ENCOUNTER
Patient daughter called to make an appt for her mother      Called daughter and left message on voicemail to return our call    **patient was scheduled on 5/24/22 with Dr Hopper but was canceled   Patient rafa have a referral placed

## 2022-12-15 DIAGNOSIS — K21.9 GASTROESOPHAGEAL REFLUX DISEASE WITHOUT ESOPHAGITIS: ICD-10-CM

## 2022-12-15 RX ORDER — FAMOTIDINE 20 MG/1
20 TABLET, FILM COATED ORAL 2 TIMES DAILY
Qty: 90 TABLET | Refills: 0 | Status: SHIPPED | OUTPATIENT
Start: 2022-12-15

## 2023-01-31 DIAGNOSIS — K21.9 GASTROESOPHAGEAL REFLUX DISEASE WITHOUT ESOPHAGITIS: ICD-10-CM

## 2023-01-31 RX ORDER — FAMOTIDINE 20 MG/1
20 TABLET, FILM COATED ORAL 2 TIMES DAILY
Qty: 90 TABLET | Refills: 0 | Status: SHIPPED | OUTPATIENT
Start: 2023-01-31

## 2023-03-02 ENCOUNTER — OFFICE VISIT (OUTPATIENT)
Dept: FAMILY MEDICINE CLINIC | Facility: CLINIC | Age: 63
End: 2023-03-02

## 2023-03-02 VITALS
BODY MASS INDEX: 33.77 KG/M2 | SYSTOLIC BLOOD PRESSURE: 130 MMHG | HEIGHT: 60 IN | HEART RATE: 82 BPM | OXYGEN SATURATION: 97 % | DIASTOLIC BLOOD PRESSURE: 80 MMHG | WEIGHT: 172 LBS

## 2023-03-02 DIAGNOSIS — F32.4 MAJOR DEPRESSIVE DISORDER IN PARTIAL REMISSION, UNSPECIFIED WHETHER RECURRENT (HCC): ICD-10-CM

## 2023-03-02 DIAGNOSIS — Z12.31 ENCOUNTER FOR SCREENING MAMMOGRAM FOR MALIGNANT NEOPLASM OF BREAST: ICD-10-CM

## 2023-03-02 DIAGNOSIS — Z00.00 ANNUAL PHYSICAL EXAM: Primary | ICD-10-CM

## 2023-03-02 DIAGNOSIS — K21.9 GASTROESOPHAGEAL REFLUX DISEASE WITHOUT ESOPHAGITIS: ICD-10-CM

## 2023-03-02 DIAGNOSIS — L21.0 DANDRUFF: ICD-10-CM

## 2023-03-02 RX ORDER — KETOCONAZOLE 20 MG/ML
1 SHAMPOO TOPICAL 2 TIMES WEEKLY
Qty: 120 ML | Refills: 1 | Status: SHIPPED | OUTPATIENT
Start: 2023-03-02

## 2023-03-02 RX ORDER — PAROXETINE HYDROCHLORIDE 40 MG/1
40 TABLET, FILM COATED ORAL
Qty: 90 TABLET | Refills: 0 | Status: SHIPPED | OUTPATIENT
Start: 2023-03-02

## 2023-03-02 RX ORDER — FAMOTIDINE 40 MG/1
40 TABLET, FILM COATED ORAL 2 TIMES DAILY
Qty: 60 TABLET | Refills: 2 | Status: SHIPPED | OUTPATIENT
Start: 2023-03-02

## 2023-03-02 NOTE — PROGRESS NOTES
[address] \[facility]\"" MEDICINE NATALIE    NAME: Fransisca Chawla  AGE: 63 y o  SEX: female  : 1960     DATE: 3/2/2023     Assessment and Plan:     Problem List Items Addressed This Visit        Digestive    Gastroesophageal reflux disease without esophagitis    Relevant Medications    famotidine (PEPCID) 40 MG tablet       Musculoskeletal and Integument    Dandruff     Dandruff returned for past few weeks  No improvement with H&S, which helped before  She called her dermatologist who suggested it may be fungal and she might require antifungal shampoo  Scaly, flaky dandruff noted on exam     Plan:  Nizoral shampoo 2x/week  Ana Ro as needed  Continue f/u with dermatology         Relevant Medications    ketoconazole (NIZORAL) 2 % shampoo       Other    Depression     Endorses good mood lately  No signs or symptoms of depression  Psych recently increased Paxil dose from 30 mg to 40 mg, which she is tolerating well  Plan:  Continue regular follow up with psych  Continue Paxil         Relevant Medications    PARoxetine (PAXIL) 40 MG tablet    BMI 34 0-34 9,adult     Body mass index is 34 16 kg/m²  • Recommend incorporating a more whole foods plant-predominant diet along with decreasing consumption of red meats and processed foods  o Food Rx filled this AM  o Encourage increased fiber intake  • Per AHA guidelines, recommend moderate-vigorous intensity exercise for 30 minutes a day for 5 days a week or a total of 150 min/week  • Referral to weight management           Relevant Orders    Ambulatory Referral to Weight Management   Other Visit Diagnoses     Annual physical exam    -  Primary    Encounter for screening mammogram for malignant neoplasm of breast        Relevant Orders    Mammo screening bilateral w 3d & cad          Immunizations and preventive care screenings were discussed with patient today   Appropriate education was printed on patient's after visit summary  Counseling:  Alcohol/drug use: discussed moderation in alcohol intake, the recommendations for healthy alcohol use, and avoidance of illicit drug use  Dental Health: discussed importance of regular tooth brushing, flossing, and dental visits  · Exercise: the importance of regular exercise/physical activity was discussed  Recommend exercise 3-5 times per week for at least 30 minutes  BMI Counseling: Body mass index is 34 16 kg/m²  The BMI is above normal  Nutrition recommendations include decreasing portion sizes, encouraging healthy choices of fruits and vegetables, decreasing fast food intake, limiting drinks that contain sugar and reducing intake of saturated and trans fat  No pharmacotherapy was ordered  Rationale for BMI follow-up plan is due to patient being overweight or obese  Return in about 1 year (around 3/2/2024) for Annual physical      Chief Complaint:     Chief Complaint   Patient presents with   • Follow-up      History of Present Illness:     Adult Annual Physical   Patient here for a comprehensive physical exam  The patient reports problems - dandruff, obesity  Diet and Physical Activity  · Diet/Nutrition: poor diet, high fat diet and limited fruits/vegetables  · Exercise: very active at home doing chores, climbing up and down stairs  Depression Screening  PHQ-2/9 Depression Screening         General Health  · Sleep: sleeps well, gets 4-6 hours of sleep on average and snores loudly  · Hearing: normal - bilateral   · Vision: no vision problems and wears glasses  · Dental: regular dental visits, brushes teeth three times daily and flosses teeth occasionally  /GYN Health  · Patient is: postmenopausal  · Last menstrual period: ~10 years ago  · Contraceptive method: none  Review of Systems:     Review of Systems   Constitutional: Negative for chills and fever  HENT: Negative for ear pain and sore throat      Eyes: Negative for pain and visual disturbance  Respiratory: Negative for cough and shortness of breath  Cardiovascular: Negative for chest pain and palpitations  Gastrointestinal: Negative for abdominal pain and vomiting  Genitourinary: Negative for dysuria and hematuria  Musculoskeletal: Negative for arthralgias and back pain  Skin: Negative for color change and rash  Neurological: Negative for seizures and syncope  All other systems reviewed and are negative       Past Medical History:     Past Medical History:   Diagnosis Date   • Anemia    • Anxiety disorder    • Bipolar disorder ([facility] [address] )    • Psychiatric disorder       Past Surgical History:     Past Surgical History:   Procedure Laterality Date   •  SECTION      , ,    • DENTAL SURGERY     • TUBAL LIGATION        Social History:     Social History     Socioeconomic History   • Marital status: /Civil Union     Spouse name: Not on file   • Number of children: Not on file   • Years of education: Not on file   • Highest education level: Not on file   Occupational History   • Not on file   Tobacco Use   • Smoking status: Never   • Smokeless tobacco: Never   Vaping Use   • Vaping Use: Never used   Substance and Sexual Activity   • Alcohol use: No   • Drug use: No   • Sexual activity: Not Currently     Partners: Male     Birth control/protection: Condom Female   Other Topics Concern   • Not on file   Social History Narrative   • Not on file     Social Determinants of Health     Financial Resource Strain: Not on file   Food Insecurity: Not on file   Transportation Needs: Not on file   Physical Activity: Not on file   Stress: Not on file   Social Connections: Not on file   Intimate Partner Violence: Not on file   Housing Stability: Not on file      Family History:     Family History   Problem Relation Age of Onset   • Depression Mother    • Diabetes Mother    • Arthritis Father    • Bipolar disorder Son    • Anxiety disorder Son    • Depression Son    • Anxiety disorder Daughter    • Bipolar disorder Daughter    • Depression Daughter       Current Medications:     Current Outpatient Medications   Medication Sig Dispense Refill   • famotidine (PEPCID) 40 MG tablet Take 1 tablet (40 mg total) by mouth 2 (two) times a day 60 tablet 2   • ketoconazole (NIZORAL) 2 % shampoo Apply 1 application  topically 2 (two) times a week 120 mL 1   • PARoxetine (PAXIL) 40 MG tablet Take 1 tablet (40 mg total) by mouth daily at bedtime 90 tablet 0   • amcinonide (CYCLOCORT) 0 1 % lotion Apply topically 2 (two) times a day Please use 2 times a day, preferably after you have showered and before bed, and spread evenly across the scalp  Please use this in combination with your medicated shampoos  This steroid lotion should be used for 3 weeks every day  60 mL 0   • betamethasone valerate (VALISONE) 0 1 % lotion Apply topically 2 (two) times a day (Patient not taking: Reported on 2/9/2022 ) 60 mL 0   • Blood Pressure KIT by Does not apply route daily 1 each 0   • hydrocortisone 1 % cream APPLY TOPICALLY 2 (TWO) TIMES A DAY AS NEEDED (FOR NECK SKIN IRRITATION) 28 4 g 2   • Misc  Devices (SITZ BATH) MISC by Does not apply route (Patient not taking: Reported on 7/8/2021)     • naproxen (NAPROSYN) 500 mg tablet Take 1 tablet (500 mg total) by mouth 2 (two) times a day with meals 90 tablet 0   • Skin Protectants, Misc  (eucerin) cream Apply topically as needed for wound care (Patient not taking: Reported on 7/8/2021) 397 g 0   • zolpidem (AMBIEN) 10 mg tablet Take 10 mg by mouth daily at bedtime as needed       No current facility-administered medications for this visit  Allergies:     No Known Allergies   Physical Exam:     /80   Pulse 82   Ht 4' 11 5" (1 511 m)   Wt 78 kg (172 lb)   LMP  (LMP Unknown)   SpO2 97%   BMI 34 16 kg/m²     Physical Exam  Vitals and nursing note reviewed  Constitutional:       General: She is not in acute distress       Appearance: She is well-developed  She is obese  HENT:      Head: Normocephalic and atraumatic  Eyes:      Conjunctiva/sclera: Conjunctivae normal    Cardiovascular:      Rate and Rhythm: Normal rate and regular rhythm  Heart sounds: No murmur heard  Pulmonary:      Effort: Pulmonary effort is normal  No respiratory distress  Breath sounds: Normal breath sounds  Abdominal:      Palpations: Abdomen is soft  Tenderness: There is no abdominal tenderness  Musculoskeletal:         General: No swelling  Cervical back: Neck supple  Skin:     General: Skin is warm and dry  Capillary Refill: Capillary refill takes less than 2 seconds  Neurological:      Mental Status: She is alert     Psychiatric:         Mood and Affect: Mood normal           Jeri Lerma MD  [facility]

## 2023-03-02 NOTE — PATIENT INSTRUCTIONS
Dieta con alto contenido de [place]   LO QUE NECESITA SABER:   Canary Se dieta con alto contenido de fibra incluye alimentos con mc joan cantidad de fibra  La fibra es la parte de las frutas, los vegetales y los granos, que no se descompone al ser ingerida por dang cuerpo  La fibra ayuda a regular las evacuaciones intestinales  La fibra además ayuda a bajar el colesterol, controlar la glucosa en la maría en las personas que sufren de diabetes y para aliviar el estreñimiento  También la fibra podría ayudarle a controlar dang peso debido a que le da la sensación de llenura más rápidamente  La mayoría de los adultos deberían consumir entre 25 a 35 gramos de fibra al día  Consulte con dang dietista o médico sobre la cantidad de fibra que usted necesita  INSTRUCCIONES SOBRE [facility]ITAL[facility]:   Buenas lovelace de fibra:      Alimentos que contienen al menos 4 gramos de fibra por porción:     ? a ½ de mc taza de cereal con alto contenido de fibra (tristan la etiqueta nutricional en la caja)    ½ taza de moras o frambuesas    4 ciruelas pasas    1 alcachofa cocida    ½ taza de legumbres cocidas, ramona lentejas o frijoles rojos o pintos    Alimentos que contienen 1 a 3 gramos de [place] por porción:     1 rebanada de pan de jason integral, pan integral de carmichael o pan de carmichael    ½ taza de arroz integral cocido    4 galletas integrales    1 taza de barbra    ½ taza de cereal con 1 a 3 gramos de fibra por porción (tristan la etiqueta nutricional en la caja)    1 porción pequeña de fruta ramona manzana, banana, kaylin, kiwi o naranja    3 dátiles    ½ taza de albaricoques enlatados, ensalada de frutas, duraznos o peras    ½ taza de verduras crudas o cocidas, ramona zanahorias, coliflor, repollo, espinaca, calabaza o maíz  Formas en que puede aumentar la [place] en dang dieta:  Rose Sat integral o melissa en vez de arroz fagan     Use harina de grano integral en las recetas en vez de idalia Shaw y miranda a los [place] [facility] [address] y verduras frescas con la cascara en vez de jugos  Otras pautas dietéticas que debe seguir:  Virgle Endeavor a cruz dieta lentamente  Usted podría presentar malestar o inflamación estomacal y gases si añade fibra a cruz dieta demasiado rápido  Urszula mucho líquido a medida que agrega fibra a cruz dieta  Es posible que tenga náuseas o desarrolle estreñimiento si no marcy suficiente agua  Pregunte cuánto líquido debe carlie cada día y cuáles líquidos son los más adecuados para usted  © Copyright Carney Robinson 2022 Information is for End User's use only and may not be sold, redistributed or otherwise used for commercial purposes  Esta información es sólo para uso en educación  Cruz intención no es darle un consejo médico sobre enfermedades o tratamientos  Colsulte con cruz Mort Baas farmacéutico antes de seguir cualquier régimen médico para saber si es seguro y efectivo para usted  Control del peso   LO QUE NECESITA SABER:   Tener sobrepeso aumenta cruz riesgo de presentar problemas de ty ramona enfermedades del corazón, hipertensión, diabetes tipo 2 y ciertos tipos de cáncer  También puede aumentar cruz riesgo de presentar osteoartritis, apnea del sueño y otros problemas respiratorios  Trate de bajar de peso de forma gradual y [place]  Incluso mc mínima pérdida de peso puede disminuir cruz riesgo de problemas de [place]  INSTRUCCIONES SOBRE [facility]ITAL[facility]:   Cómo bajar de peso de Radha forma yang: Kia Tigre forma yang y saludable para perder peso es consumir menos calorías y realizar mc actividad física en forma regular  Usted puede perder hasta 1 oscar por semana al reducir el consumo de 500 calorías cada día  Usted puede reducir el consumo de calorías al comer porciones más pequeñas o eliminar los alimentos con alto contenido de calorías  Anila las etiquetas para determinar la cantidad de calorías que contienen los alimentos que consume           También puede quemar calorías al [employer] ejercicio ramona caminar, nadar o montar en bicicleta  Es más probable que usted [employer] peso si hace de estos cambios parte de dang estilo de kalie  Realice mc actividad física por lo menos 30 minutos al día, la mayoría de los días de la [place]  Usted también puede realizar más actividad física usando las escaleras en vez de los ascensores o estacionarse más lejos cuando [place]ridge Burner a las tiendas  Pregunte a dang médico acerca del mejor plan de ejercicio para usted  Plan de alimentación saludable para el control del peso: Un plan de alimentación saludable incluye mc variedad de alimentos, contiene más pocas calorías y lo ayuda a estar saludable  Un plan de alimentación saludable incluye lo siguiente:     Consuma alimentos de grano integral con más frecuencia  Un plan de alimentación saludable debe contener alimentos con fibra  La fibra es la parte de las frutas, verduras y granos que dang cuerpo no puede digerir  Los alimentos de granos integrales son saludables y suministran fibra adicional a dang Denisse Rockers  Algunos ejemplos de alimentos de granos integrales son los panes integrales, pastas integrales, la barbra, el arroz integral y jason de bulgur  Consuma mc variedad de verduras todos los días  [address], coliflor, col nusrat y [place]  Coma verduras anaranjadas ramona las zanahorias, mikki dulces y calabaza de invierno  Consuma mc variedad de frutas todos los [address]  Escoja frutas frescas o enlatadas en dang propio jugo o con jugo bajo en [place] en vez de jugo  El [place] de frutas tiene [address] o shanae nada de Joann  Consuma productos lácteos con bajo contenido de [place]  Reyes Católicos 85 de 1%  Consuma yogur descremado y requesón (cottage) semidescremado  Trate de consumir quesos descremados ramona el queso mozzarela y otros quesos semidescremado  Seleccione yamel y otros alimentos con proteínas bajos en grasa   Escoja kylah u otras legumbres Cliff christensens o las lentejas  Seleccione pescado, carne de aves sin piel (ramona el lois o [place]), lopez de carne [place] (de res o de cerdo)  Antes de cocinar las yamel o las aves domitila cualquier parte de grasa visible  Use menos grasas y aceites  Trate de hornear los alimentos en lugar de freírlos  Trate de hornear los alimentos en lugar de freírlos  Consuma menos alimentos de alto tenor graso  Coma menos alimentos altos en grasa ramona las mikki fritas, donas, helados y pasteles  Consuma menos dulces  Limite los alimentos y las bebidas con un gran contenido de azúcar  Estos incluyen los caramelos, galletas, gaseosas normales y bebidas endulzadas  Formas de reducir las calorías:  Reduzca el tamaño de las porciones  Use platos pequeños para servirse porciones pequeñas  No coma segundas porciones  Cuando coma en un restaurante, pida mc Paola Noun y guarde en darshan la mitad de la comida antes de empezar a comer  Comparta con alguien un plato de entrada  Reemplace los bocadillos o meriendas altos en calorías por los saludables de menos calorías  Escoja frutas frescas, verduras, galletas de arroz bajo en grasa o palomitas de maíz en lugar de comer mikki fritas de paquete, nueces o dulces de chocolate  Osino agua o bebidas dietéticas en lugar de las endulzadas  No vaya al silva cuando tenga hambre  Usted podría ser más propenso a elegir alimentos no saludables  Lleve mc lista de alimentos saludables y vaya de compras después de adelaide comido  Coma lynnette comidas regularmente  No se salte ninguna comida  No omita ninguna comida porque esto puede conducir a comer más a mc hora más tarde del día  Cave podría traerle problemas para perder peso  Si no tiene tiempo para hacer comidas regulares, consuma un refrigerio saludable  Hable con un dietista para que lo ayude a crear un plan de comidas y un horario que tim adecuados para usted      Otras cosas que debe tener en cuenta mientras trata de bajar de peso:  Jaleel Flight consciente de las situaciones que podrían ocasionarle ganas de comer en exceso, ramona el comer mientras tyler la televisión  Busque formas para evitar estas situaciones  Por ejemplo, leer un libro, caminar o hacer trabajos manuales  Reúnase con un karley de [de-identified] o con personas que también están tratando de bajar de [place]  Natalia le puede ayudar a mantenerse motivado y continuar progresando en dang objetivo de perder peso  © Copyright [facility] 2022 Information is for End User's use only and may not be sold, redistributed or otherwise used for commercial purposes  Esta información es sólo para uso en educación  Dang intención no es darle un consejo médico sobre enfermedades o tratamientos  Colsulte con dang Deadra Topete farmacéutico antes de seguir cualquier régimen médico para saber si es seguro y efectivo para usted

## 2023-03-03 NOTE — ASSESSMENT & PLAN NOTE
Endorses good mood lately  No signs or symptoms of depression  Psych recently increased Paxil dose from 30 mg to 40 mg, which she is tolerating well      Plan:  Continue regular follow up with psych  Continue Paxil

## 2023-03-03 NOTE — ASSESSMENT & PLAN NOTE
Body mass index is 34 16 kg/m²      • Recommend incorporating a more whole foods plant-predominant diet along with decreasing consumption of red meats and processed foods  o Food Rx filled this AM  o Encourage increased fiber intake  • Per AHA guidelines, recommend moderate-vigorous intensity exercise for 30 minutes a day for 5 days a week or a total of 150 min/week  • Referral to weight management

## 2023-03-03 NOTE — ASSESSMENT & PLAN NOTE
Dandruff returned for past few weeks  No improvement with H&S, which helped before  She called her dermatologist who suggested it may be fungal and she might require antifungal shampoo   Scaly, flaky dandruff noted on exam     Plan:  Nizoral shampoo 2x/week  Ana Schmidt as needed  Continue f/u with dermatology

## 2023-04-29 ENCOUNTER — HOSPITAL ENCOUNTER (EMERGENCY)
Facility: HOSPITAL | Age: 63
Discharge: HOME | End: 2023-04-29
Attending: EMERGENCY MEDICINE | Admitting: EMERGENCY MEDICINE

## 2023-04-29 VITALS
DIASTOLIC BLOOD PRESSURE: 69 MMHG | TEMPERATURE: 98.5 F | OXYGEN SATURATION: 100 % | RESPIRATION RATE: 18 BRPM | HEART RATE: 75 BPM | WEIGHT: 172 LBS | SYSTOLIC BLOOD PRESSURE: 132 MMHG | BODY MASS INDEX: 34.16 KG/M2

## 2023-04-29 DIAGNOSIS — J06.9 URI (UPPER RESPIRATORY INFECTION): Primary | ICD-10-CM

## 2023-04-29 LAB
FLUAV RNA RESP QL NAA+PROBE: NEGATIVE
FLUBV RNA RESP QL NAA+PROBE: NEGATIVE
RSV RNA RESP QL NAA+PROBE: NEGATIVE
SARS-COV-2 RNA RESP QL NAA+PROBE: NEGATIVE

## 2023-04-29 RX ORDER — OXYMETAZOLINE HYDROCHLORIDE 0.05 G/100ML
2 SPRAY NASAL ONCE
Status: COMPLETED | OUTPATIENT
Start: 2023-04-29 | End: 2023-04-29

## 2023-04-29 RX ORDER — ACETAMINOPHEN 325 MG/1
650 TABLET ORAL EVERY 6 HOURS PRN
Qty: 30 TABLET | Refills: 0 | Status: SHIPPED | OUTPATIENT
Start: 2023-04-29

## 2023-04-29 RX ORDER — KETOROLAC TROMETHAMINE 30 MG/ML
30 INJECTION, SOLUTION INTRAMUSCULAR; INTRAVENOUS ONCE
Status: COMPLETED | OUTPATIENT
Start: 2023-04-29 | End: 2023-04-29

## 2023-04-29 RX ORDER — IBUPROFEN 400 MG/1
400 TABLET ORAL EVERY 6 HOURS PRN
Qty: 30 TABLET | Refills: 0 | Status: SHIPPED | OUTPATIENT
Start: 2023-04-29 | End: 2023-05-06

## 2023-04-29 RX ORDER — ACETAMINOPHEN 325 MG/1
975 TABLET ORAL ONCE
Status: COMPLETED | OUTPATIENT
Start: 2023-04-29 | End: 2023-04-29

## 2023-04-29 RX ORDER — OXYMETAZOLINE HYDROCHLORIDE 0.05 G/100ML
2 SPRAY NASAL 2 TIMES DAILY
Qty: 1.2 ML | Refills: 0 | Status: SHIPPED | OUTPATIENT
Start: 2023-04-29 | End: 2023-05-02

## 2023-04-29 RX ADMIN — OXYMETAZOLINE HYDROCHLORIDE 2 SPRAY: 0.05 SPRAY NASAL at 11:52

## 2023-04-29 RX ADMIN — DEXAMETHASONE SODIUM PHOSPHATE 10 MG: 10 INJECTION, SOLUTION INTRAMUSCULAR; INTRAVENOUS at 11:51

## 2023-04-29 RX ADMIN — ACETAMINOPHEN 975 MG: 325 TABLET ORAL at 11:49

## 2023-04-29 RX ADMIN — KETOROLAC TROMETHAMINE 30 MG: 30 INJECTION, SOLUTION INTRAMUSCULAR; INTRAVENOUS at 11:54

## 2023-04-29 NOTE — ED PROVIDER NOTES
Final Diagnoses:     1  URI (upper respiratory infection)         Nursing Triage:     Chief Complaint   Patient presents with    Sore Throat     Pt has c/o sore throat and a rash on her R arm for 2 days     HPI:   This is a 63 y o  female presenting for evaluation of viral syndrome  The patient for the last couple days has been having cough, rhinorrhea, sore throat  No known sick contacts  Eating/drinking okay  Tried tylenol once yesterday  No dizziness/LH  Denies SOB/CP  Denies any urinary tract infection symptoms (burning, itching, pain, blood, frequency)  Vaccinated jolanta Avila x2    PMH: Bipolar disorder  Denies recent surgeries  Denies smoking drinking drugs    ASSESSMENT + PLAN:   Viral syndrome: Patient's story / exam fits with viral syndrome  - Offered viral testing ? patient wants   - Discussed Tylenol/NSAIDs here + q6h at home  - Discussed that it will have to run its course and low yield of antibiotics  - RTER precautions discussed orally  - afrin as a nasal decongestant    Physical:   General: VSS, NAD, awake, alert  Well-nourished, well-developed  Appears stated age  Speaking normally in full sentences  Head: Normocephalic, atraumatic, nontender  Eyes: PERRL, EOM-I  No diplopia  No hyphema  No subconjunctival hemorrhages  Symmetrical lids  ENT: Atraumatic external nose and ears  MMM  Minimal pharyngeal erythema WITHOUT exudates  Normal voice  No lymph nodes  No chest wall tenderness  Normal gait  Able to get up from a laying position without any assistance  No LE edema  No malocclusion  No stridor  Normal phonation  No drooling  Normal swallowing  Neck: Symmetric, trachea midline  No JVD  CV: RRR  +S1/S2  No murmurs or gallops  Peripheral pulses +2 throughout  No chest wall tenderness  Lungs:   Unlabored No retractions  CTAB, lungs sounds equal bilateral    No tachypnea  Abd: +BS, soft, NT/ND    MSK:   FROM   Back:   No rashes  Skin: Dry, intact     Neuro: AAOx3, GCS 15, CN II-XII grossly intact  Motor grossly intact  Psychiatric/Behavioral: Appropriate mood and affect   Exam: deferred    Vitals:    23 1124   BP: 132/69   BP Location: Left arm   Pulse: 75   Resp: 18   Temp: 98 5 °F (36 9 °C)   TempSrc: Tympanic   SpO2: 100%   Weight: 78 kg (172 lb)     - There are no obvious limitations to social determinants of care  - Nursing note reviewed  - Vitals reviewed  - Orders placed by myself and/or advanced practitioner / resident     - Previous chart was reviewed  - [place] language barrier: iPad transaltor used  - History obtained from patient  - There are no limitations to the history obtained:     Past Medical:    has a past medical history of Anemia, Anxiety disorder, Bipolar disorder ([address] ), and Psychiatric disorder  Past Surgical:    has a past surgical history that includes  section; Tubal ligation; and Dental surgery  Social:     Social History     Substance and Sexual Activity   Alcohol Use No     Social History     Tobacco Use   Smoking Status Never   Smokeless Tobacco Never     Social History     Substance and Sexual Activity   Drug Use No       Echo:   No results found for this or any previous visit  No results found for this or any previous visit  Cath:    No results found for this or any previous visit        Code Status: No Order  Advance Directive and Living Will:      Power of :    POLST:    Medications   acetaminophen (TYLENOL) tablet 975 mg (has no administration in time range)   ketorolac (TORADOL) injection 30 mg (has no administration in time range)   dexamethasone oral liquid 10 mg 1 mL (has no administration in time range)   oxymetazoline (AFRIN) 0 05 % nasal spray 2 spray (has no administration in time range)     No orders to display     Orders Placed This Encounter   Procedures    FLU/RSV/COVID - if FLU/RSV clinically relevant     Labs Reviewed - No data to display  Time reflects when diagnosis was documented in both MDM as applicable and the Disposition within this note     Time User Action Codes Description Comment    4/29/2023 11:41 AM Janny Ivory [[id number]] URI (upper respiratory infection)       ED Disposition     ED Disposition   Discharge    Condition   Stable    Date/Time   Sat Apr 29, 2023 11:43 AM    Comment   Javan Wesley discharge to home/self care  Follow-up Information     Follow up With Specialties Details Why Contact Info Additional [address] Emergency Department Emergency Medicine Go to  If symptoms worsen [address] Emergency Department, [address] [place] [address], [address] [facility]        Patient's Medications   Discharge Prescriptions    ACETAMINOPHEN (TYLENOL) 325 MG TABLET    Take 2 tablets (650 mg total) by mouth every 6 (six) hours as needed for mild pain or fever       Start Date: 4/29/2023 End Date: --       Order Dose: 650 mg       Quantity: 30 tablet    Refills: 0    IBUPROFEN (MOTRIN) 400 MG TABLET    Take 1 tablet (400 mg total) by mouth every 6 (six) hours as needed for mild pain for up to 7 days       Start Date: 4/29/2023 End Date: 5/6/2023       Order Dose: 400 mg       Quantity: 30 tablet    Refills: 0    OXYMETAZOLINE (AFRIN) 0 05 % NASAL SPRAY    2 sprays by Each Nare route 2 (two) times a day for 3 days       Start Date: 4/29/2023 End Date: 5/2/2023       Order Dose: 2 sprays       Quantity: 1 2 mL    Refills: 0     No discharge procedures on file  Prior to Admission Medications   Prescriptions Last Dose Informant Patient Reported? Taking? Blood Pressure KIT   No No   Sig: by Does not apply route daily   Misc   Devices (SITZ BATH) MISC   Yes No   Sig: by Does not apply route   Patient not taking: Reported on 7/8/2021   PARoxetine (PAXIL) 40 MG tablet   No No   Sig: Take 1 tablet (40 mg total) by mouth daily at "bedtime   Skin Protectants, Misc  (eucerin) cream   No No   Sig: Apply topically as needed for wound care   Patient not taking: Reported on 7/8/2021   amcinonide (CYCLOCORT) 0 1 % lotion   No No   Sig: Apply topically 2 (two) times a day Please use 2 times a day, preferably after you have showered and before bed, and spread evenly across the scalp  Please use this in combination with your medicated shampoos  This steroid lotion should be used for 3 weeks every day  betamethasone valerate (VALISONE) 0 1 % lotion   No No   Sig: Apply topically 2 (two) times a day   Patient not taking: Reported on 2/9/2022    clobetasol (TEMOVATE) 0 05 % external solution   No No   Sig: Apply topically 2 (two) times a day To scalp for up to 2 weeks at a time  Please allow one week break in between uses  famotidine (PEPCID) 40 MG tablet   No No   Sig: Take 1 tablet (40 mg total) by mouth 2 (two) times a day   hydrocortisone 1 % cream   No No   Sig: APPLY TOPICALLY 2 (TWO) TIMES A DAY AS NEEDED (FOR NECK SKIN IRRITATION)   ketoconazole (NIZORAL) 2 % shampoo   No No   Sig: Apply 1 application  topically 2 (two) times a week   naproxen (NAPROSYN) 500 mg tablet   No No   Sig: Take 1 tablet (500 mg total) by mouth 2 (two) times a day with meals   zolpidem (AMBIEN) 10 mg tablet   Yes No   Sig: Take 10 mg by mouth daily at bedtime as needed      Facility-Administered Medications: None                        Portions of the record may have been created with voice recognition software  Occasional wrong word or \"sound a like\" substitutions may have occurred due to the inherent limitations of voice recognition software  Read the chart carefully and recognize, using context, where substitutions have occurred      Electronically signed by:  Lucy Pang MD  04/29/23 1146    "

## 2023-04-29 NOTE — Clinical Note
Ai Ricci was seen and treated in our emergency department on 4/29/2023  No restrictions            Diagnosis: Viral syndrome    Jolynn    She may return on this date: 05/01/2023         If you have any questions or concerns, please don't hesitate to call        Lulu Feldman MD    ______________________________           _______________          _______________  Hospital Representative                              Date                                Time

## 2023-05-03 DIAGNOSIS — L21.0 DANDRUFF: ICD-10-CM

## 2023-05-03 RX ORDER — KETOCONAZOLE 20 MG/ML
1 SHAMPOO TOPICAL 2 TIMES WEEKLY
Qty: 120 ML | Refills: 0 | Status: SHIPPED | OUTPATIENT
Start: 2023-05-04

## 2023-07-14 DIAGNOSIS — L40.9 PSORIASIS: ICD-10-CM

## 2023-07-14 DIAGNOSIS — L21.0 DANDRUFF: ICD-10-CM

## 2023-07-17 RX ORDER — KETOCONAZOLE 20 MG/ML
1 SHAMPOO TOPICAL 2 TIMES WEEKLY
Qty: 120 ML | Refills: 0 | OUTPATIENT
Start: 2023-07-17

## 2023-07-17 RX ORDER — CLOBETASOL PROPIONATE 0.46 MG/ML
SOLUTION TOPICAL 2 TIMES DAILY
Qty: 50 ML | Refills: 3 | OUTPATIENT
Start: 2023-07-17

## 2023-07-19 DIAGNOSIS — L40.9 PSORIASIS: ICD-10-CM

## 2023-07-19 DIAGNOSIS — L21.0 DANDRUFF: ICD-10-CM

## 2023-07-19 RX ORDER — CLOBETASOL PROPIONATE 0.46 MG/ML
SOLUTION TOPICAL 2 TIMES DAILY
Qty: 50 ML | Refills: 1 | Status: SHIPPED | OUTPATIENT
Start: 2023-07-19

## 2023-07-19 RX ORDER — KETOCONAZOLE 20 MG/ML
1 SHAMPOO TOPICAL 2 TIMES WEEKLY
Qty: 120 ML | Refills: 11 | Status: SHIPPED | OUTPATIENT
Start: 2023-07-20

## 2023-07-19 NOTE — TELEPHONE ENCOUNTER
Patient's daughter called wanting to know why the Ketoconale 2% shampoo and the Cloetasol 0.05% external solution were denied>    Please check into this and send in the refill into the pharmacy    Please call the patient to advise    thanks

## 2023-08-16 DIAGNOSIS — L40.9 PSORIASIS: ICD-10-CM

## 2023-08-17 RX ORDER — CLOBETASOL PROPIONATE 0.46 MG/ML
SOLUTION TOPICAL 2 TIMES DAILY
Qty: 50 ML | Refills: 1 | Status: SHIPPED | OUTPATIENT
Start: 2023-08-17

## 2023-10-12 ENCOUNTER — OFFICE VISIT (OUTPATIENT)
Dept: INTERNAL MEDICINE CLINIC | Facility: CLINIC | Age: 63
End: 2023-10-12

## 2023-10-12 VITALS
SYSTOLIC BLOOD PRESSURE: 126 MMHG | TEMPERATURE: 98.1 F | BODY MASS INDEX: 35.93 KG/M2 | HEIGHT: 60 IN | WEIGHT: 183 LBS | DIASTOLIC BLOOD PRESSURE: 76 MMHG | HEART RATE: 91 BPM

## 2023-10-12 DIAGNOSIS — F33.1 MODERATE EPISODE OF RECURRENT MAJOR DEPRESSIVE DISORDER (HCC): ICD-10-CM

## 2023-10-12 DIAGNOSIS — E66.9 OBESITY (BMI 35.0-39.9 WITHOUT COMORBIDITY): ICD-10-CM

## 2023-10-12 DIAGNOSIS — Z00.00 ANNUAL PHYSICAL EXAM: Primary | ICD-10-CM

## 2023-10-12 DIAGNOSIS — Z12.4 SCREENING FOR CERVICAL CANCER: ICD-10-CM

## 2023-10-12 DIAGNOSIS — Z12.31 ENCOUNTER FOR SCREENING MAMMOGRAM FOR BREAST CANCER: ICD-10-CM

## 2023-10-12 PROCEDURE — 99386 PREV VISIT NEW AGE 40-64: CPT | Performed by: STUDENT IN AN ORGANIZED HEALTH CARE EDUCATION/TRAINING PROGRAM

## 2023-10-12 RX ORDER — CARIPRAZINE 1.5 MG/1
1.5 CAPSULE, GELATIN COATED ORAL DAILY
COMMUNITY
Start: 2023-10-10

## 2023-10-12 RX ORDER — BUPROPION HYDROCHLORIDE 75 MG/1
150 TABLET ORAL DAILY
COMMUNITY
Start: 2023-08-05

## 2023-10-12 RX ORDER — ALPRAZOLAM 0.5 MG/1
1 TABLET ORAL 2 TIMES DAILY PRN
COMMUNITY
Start: 2023-09-30

## 2023-10-12 NOTE — ASSESSMENT & PLAN NOTE
PHQ-2/9 Depression Screening    Little interest or pleasure in doing things: 1 - several days  Feeling down, depressed, or hopeless: 3 - nearly every day  Trouble falling or staying asleep, or sleeping too much: 3 - nearly every day  Feeling tired or having little energy: 3 - nearly every day  Poor appetite or overeatin - not at all  Feeling bad about yourself - or that you are a failure or have let yourself or your family down: 1 - several days  Trouble concentrating on things, such as reading the newspaper or watching television: 2 - more than half the days  Moving or speaking so slowly that other people could have noticed. Or the opposite - being so fidgety or restless that you have been moving around a lot more than usual: 2 - more than half the days  Thoughts that you would be better off dead, or of hurting yourself in some way: 1 - several days  PHQ-9 Score: 16   PHQ-9 Interpretation: Moderately severe depression            R/o hypothyroidism, vit D deficiency, bipolar disorder    TSH ordered  Vit D ordered    Social support: spouse, daughter, granddaughter  Lives with spouse, feels safe  Denies active or passive SI/HI, no access to weapons at home  no irritability, + psychomotor slowing, + feelings of guilt, + low energy levels, + issues concentrating, + anhedonia  no manic symptoms  Current regimen: welbutrin 150 daily, paxil 40 daily, vybranz 1.5mg daily, continue   Spoke with patient and daughter and educated patient on serotonin syndrome.  ED precautions given   Couseled on talking to psych dr about med rec and changing from paxil to citalopram/esciatlopram considering age; would avoid mertazipine in setting of excessive sleep and obesity  Encouraged healthy diet and lifestyle modifications  Encouraged talk-therapy, journaling, and midnfullness, patient does have psychiatrist  Encouraged 15 minutes unfiltered sunlight daily

## 2023-10-12 NOTE — PATIENT INSTRUCTIONS
Wellness Visit for Adults   AMBULATORY CARE:   A wellness visit  is when you see your healthcare provider to get screened for health problems. Your healthcare provider will also give you advice on how to stay healthy. Write down your questions so you remember to ask them. Ask your healthcare provider how often you should have a wellness visit. What happens at a wellness visit:  Your healthcare provider will ask about your health, and your family history of health problems. This includes high blood pressure, heart disease, and cancer. He or she will ask if you have symptoms that concern you, if you smoke, and about your mood. You may also be asked about your intake of medicines, supplements, food, and alcohol. Any of the following may be done: Your weight  will be checked. Your height may also be checked so your body mass index (BMI) can be calculated. Your BMI shows if you are at a healthy weight. Your blood pressure  and heart rate will be checked. Your temperature may also be checked. Blood and urine tests  may be done. Blood tests may be done to check your cholesterol levels. Abnormal cholesterol levels increase your risk for heart disease and stroke. You may also need a blood or urine test to check for diabetes if you are at increased risk. Urine tests may be done to look for signs of an infection or kidney disease. A physical exam  includes checking your heartbeat and lungs with a stethoscope. Your healthcare provider may also check your skin to look for sun damage. Screening tests  may be recommended. A screening test is done to check for diseases that may not cause symptoms. The screening tests you may need depend on your age, gender, family history, and lifestyle habits. For example, colorectal screening may be recommended if you are 48years old or older. Screening tests you need if you are a woman:   A Pap smear  is used to screen for cervical cancer.  Pap smears are usually done every 3 to 5 years depending on your age. You may need them more often if you have had abnormal Pap smear test results in the past. Ask your healthcare provider how often you should have a Pap smear. A mammogram  is an x-ray of your breasts to screen for breast cancer. Experts recommend mammograms every 2 years starting at age 48 years. You may need a mammogram at age 52 years or younger if you have an increased risk for breast cancer. Talk to your healthcare provider about when you should start having mammograms and how often you need them. Vaccines you may need:   Get an influenza vaccine  every year. The influenza vaccine protects you from the flu. Several types of viruses cause the flu. The viruses change over time, so new vaccines are made each year. Get a tetanus-diphtheria (Td) booster vaccine  every 10 years. This vaccine protects you against tetanus and diphtheria. Tetanus is a severe infection that may cause painful muscle spasms and lockjaw. Diphtheria is a severe bacterial infection that causes a thick covering in the back of your mouth and throat. Get a human papillomavirus (HPV) vaccine  if you are female and aged 23 to 32 or male 23 to 24 and never received it. This vaccine protects you from HPV infection. HPV is the most common infection spread by sexual contact. HPV may also cause vaginal, penile, and anal cancers. Get a pneumococcal vaccine  if you are aged 72 years or older. The pneumococcal vaccine is an injection given to protect you from pneumococcal disease. Pneumococcal disease is an infection caused by pneumococcal bacteria. The infection may cause pneumonia, meningitis, or an ear infection. Get a shingles vaccine  if you are 60 or older, even if you have had shingles before. The shingles vaccine is an injection to protect you from the varicella-zoster virus. This is the same virus that causes chickenpox.  Shingles is a painful rash that develops in people who had chickenpox or have been exposed to the virus. How to eat healthy:  My Plate is a model for planning healthy meals. It shows the types and amounts of foods that should go on your plate. Fruits and vegetables make up about half of your plate, and grains and protein make up the other half. A serving of dairy is included on the side of your plate. The amount of calories and serving sizes you need depends on your age, gender, weight, and height. Examples of healthy foods are listed below:  Eat a variety of vegetables  such as dark green, red, and orange vegetables. You can also include canned vegetables low in sodium (salt) and frozen vegetables without added butter or sauces. Eat a variety of fresh fruits , canned fruit in 100% juice, frozen fruit, and dried fruit. Include whole grains. At least half of the grains you eat should be whole grains. Examples include whole-wheat bread, wheat pasta, brown rice, and whole-grain cereals such as oatmeal.    Eat a variety of protein foods such as seafood (fish and shellfish), lean meat, and poultry without skin (turkey and chicken). Examples of lean meats include pork leg, shoulder, or tenderloin, and beef round, sirloin, tenderloin, and extra lean ground beef. Other protein foods include eggs and egg substitutes, beans, peas, soy products, nuts, and seeds. Choose low-fat dairy products such as skim or 1% milk or low-fat yogurt, cheese, and cottage cheese. Limit unhealthy fats  such as butter, hard margarine, and shortening. Exercise:  Exercise at least 30 minutes per day on most days of the week. Some examples of exercise include walking, biking, dancing, and swimming. You can also fit in more physical activity by taking the stairs instead of the elevator or parking farther away from stores. Include muscle strengthening activities 2 days each week. Regular exercise provides many health benefits.  It helps you manage your weight, and decreases your risk for type 2 diabetes, heart disease, stroke, and high blood pressure. Exercise can also help improve your mood. Ask your healthcare provider about the best exercise plan for you. General health and safety guidelines:   Do not smoke. Nicotine and other chemicals in cigarettes and cigars can cause lung damage. Ask your healthcare provider for information if you currently smoke and need help to quit. E-cigarettes or smokeless tobacco still contain nicotine. Talk to your healthcare provider before you use these products. Limit alcohol. A drink of alcohol is 12 ounces of beer, 5 ounces of wine, or 1½ ounces of liquor. Lose weight, if needed. Being overweight increases your risk of certain health conditions. These include heart disease, high blood pressure, type 2 diabetes, and certain types of cancer. Protect your skin. Do not sunbathe or use tanning beds. Use sunscreen with a SPF 15 or higher. Apply sunscreen at least 15 minutes before you go outside. Reapply sunscreen every 2 hours. Wear protective clothing, hats, and sunglasses when you are outside. Drive safely. Always wear your seatbelt. Make sure everyone in your car wears a seatbelt. A seatbelt can save your life if you are in an accident. Do not use your cell phone when you are driving. This could distract you and cause an accident. Pull over if you need to make a call or send a text message. Practice safe sex. Use latex condoms if are sexually active and have more than one partner. Your healthcare provider may recommend screening tests for sexually transmitted infections (STIs). Wear helmets, lifejackets, and protective gear. Always wear a helmet when you ride a bike or motorcycle, go skiing, or play sports that could cause a head injury. Wear protective equipment when you play sports. Wear a lifejacket when you are on a boat or doing water sports.     © Copyright [employer] 2023 Information is for End User's use only and may not be sold, redistributed or otherwise used for commercial purposes. The above information is an  only. It is not intended as medical advice for individual conditions or treatments. Talk to your doctor, nurse or pharmacist before following any medical regimen to see if it is safe and effective for you.

## 2023-10-12 NOTE — ASSESSMENT & PLAN NOTE
- DM screening: ordered  - CV screening: ordered  - Colon CA screening: referral to GI  - Hep C screening: not indicated  - Lung CA screening: not indicated  - Breast CA screening: mammo ordered  - Cervical CA screening: referral to gyn  - Osteoporosis screening: will need 2024      Immunization:  Flu administered yes      Return:  - in 1 year for next annual or PRN for any acute complains/issues

## 2023-10-12 NOTE — ASSESSMENT & PLAN NOTE
- pt with BMI of Body mass index is 36.34 kg/m².   - pt counseled on risks associated with obesity and it's contribution to other health issues  - advise portion control, healthy food choices, drinking water instead of juice/soda  - advise beginning light exercise program (i.e. Walking 30min 4-5x/wk) or consider gym membership  - advise keeping food diary to help identify problem foods/times/stressors  -consider nutrition referral  - pt advised that weight loss of ~ 1lb/wk is a good goal and not to become frustrated if loss is slower  -as BMI > 35, does qualify for bariatric surgery at this time, though declines referral at this time

## 2023-10-12 NOTE — PROGRESS NOTES
[address] [facility] Marisela Prayer    NAME: Gita Coleman  AGE: 63 y.o. SEX: female  : 1960     DATE: 10/12/2023     Assessment and Plan:     Problem List Items Addressed This Visit       Depression     PHQ-2/9 Depression Screening    Little interest or pleasure in doing things: 1 - several days  Feeling down, depressed, or hopeless: 3 - nearly every day  Trouble falling or staying asleep, or sleeping too much: 3 - nearly every day  Feeling tired or having little energy: 3 - nearly every day  Poor appetite or overeatin - not at all  Feeling bad about yourself - or that you are a failure or have let yourself or your family down: 1 - several days  Trouble concentrating on things, such as reading the newspaper or watching television: 2 - more than half the days  Moving or speaking so slowly that other people could have noticed. Or the opposite - being so fidgety or restless that you have been moving around a lot more than usual: 2 - more than half the days  Thoughts that you would be better off dead, or of hurting yourself in some way: 1 - several days  PHQ-9 Score: 16   PHQ-9 Interpretation: Moderately severe depression          R/o hypothyroidism, vit D deficiency, bipolar disorder    TSH ordered  Vit D ordered    Social support: spouse, daughter, granddaughter  Lives with spouse, feels safe  Denies active or passive SI/HI, no access to weapons at home  no irritability, + psychomotor slowing, + feelings of guilt, + low energy levels, + issues concentrating, + anhedonia  no manic symptoms  Current regimen: welbutrin 150 daily, paxil 40 daily, vybranz 1.5mg daily, continue   Spoke with patient and daughter and educated patient on serotonin syndrome.  ED precautions given   Couseled on talking to psych dr about med rec and changing from paxil to citalopram/esciatlopram considering age; would avoid mertazipine in setting of excessive sleep and obesity  Encouraged healthy diet and lifestyle modifications  Encouraged talk-therapy, journaling, and midnfullness, patient does have psychiatrist  Encouraged 15 minutes unfiltered sunlight daily           Relevant Medications    buPROPion (WELLBUTRIN) 75 mg tablet    Vraylar 1.5 MG capsule    ALPRAZolam (XANAX) 0.5 mg tablet    Annual physical exam - Primary     - DM screening: ordered  - CV screening: ordered  - Colon CA screening: referral to GI  - Hep C screening: not indicated  - Lung CA screening: not indicated  - Breast CA screening: mammo ordered  - Cervical CA screening: referral to gyn  - Osteoporosis screening: will need 2024      Immunization:  Flu administered yes      Return:  - in 1 year for next annual or PRN for any acute complains/issues           Relevant Orders    Mammo screening bilateral w 3d & cad    Ambulatory referral to Obstetrics / Gynecology    Lipid panel    Hemoglobin A1C    CBC and Platelet    Comprehensive metabolic panel    Vitamin D 25 hydroxy    TSH + Free T4    Iron Panel (Includes Ferritin, Iron Sat%, Iron, and TIBC)    Encounter for screening mammogram for breast cancer    Relevant Orders    Mammo screening bilateral w 3d & cad    Screening for cervical cancer    Relevant Orders    Ambulatory referral to Obstetrics / Gynecology    Obesity (BMI 35.0-39.9 without comorbidity)     - pt with BMI of Body mass index is 36.34 kg/m².   - pt counseled on risks associated with obesity and it's contribution to other health issues  - advise portion control, healthy food choices, drinking water instead of juice/soda  - advise beginning light exercise program (i.e. Walking 30min 4-5x/wk) or consider gym membership  - advise keeping food diary to help identify problem foods/times/stressors  -consider nutrition referral  - pt advised that weight loss of ~ 1lb/wk is a good goal and not to become frustrated if loss is slower  -as BMI > 35, does qualify for bariatric surgery at this time, though declines referral at this time         Relevant Orders    Lipid panel    Hemoglobin A1C    Vitamin D 25 hydroxy         Immunizations and preventive care screenings were discussed with patient today. Appropriate education was printed on patient's after visit summary. Counseling:  Alcohol/drug use: discussed moderation in alcohol intake, the recommendations for healthy alcohol use, and avoidance of illicit drug use. Dental Health: discussed importance of regular tooth brushing, flossing, and dental visits. Injury prevention: discussed safety/seat belts, safety helmets, smoke detectors, carbon dioxide detectors, and smoking near bedding or upholstery. Sexual health: discussed sexually transmitted diseases, partner selection, use of condoms, avoidance of unintended pregnancy, and contraceptive alternatives. Exercise: the importance of regular exercise/physical activity was discussed. Recommend exercise 3-5 times per week for at least 30 minutes. Return in 6 months (on 4/12/2024). Chief Complaint:     Chief Complaint   Patient presents with    Physical Exam      History of Present Illness:     Adult Annual Physical   Patient here for a comprehensive physical exam. Patient has a PMHx of depression, psoriasis, GERD, pre-DM. The patient reports  issues with sleep and concentration . SI/HI negative  Interests yes, spening time with family  Guitly feeling intermittently  Energy low  Concerntraion difficulties  Appatite ok  Psychomotor slowing yes  Sleeps a lot  Has therapist, talk-therapy, feeling comfortable with her    Daughter Popyesi Faizan is in room and confirms HPI  Compliant with medications    Diet and Physical Activity  Diet/Nutrition: well balanced diet. Exercise: walking, 1-2 times a week on average, and less than 30 minutes on average.       Depression Screening  PHQ-2/9 Depression Screening    Little interest or pleasure in doing things: 1 - several days  Feeling down, depressed, or hopeless: 3 - nearly every day  Trouble falling or staying asleep, or sleeping too much: 3 - nearly every day  Feeling tired or having little energy: 3 - nearly every day  Poor appetite or overeatin - not at all  Feeling bad about yourself - or that you are a failure or have let yourself or your family down: 1 - several days  Trouble concentrating on things, such as reading the newspaper or watching television: 2 - more than half the days  Moving or speaking so slowly that other people could have noticed. Or the opposite - being so fidgety or restless that you have been moving around a lot more than usual: 2 - more than half the days  Thoughts that you would be better off dead, or of hurting yourself in some way: 1 - several days  PHQ-9 Score: 16   PHQ-9 Interpretation: Moderately severe depression          General Health  Sleep: sleeps well and gets 7-8 hours of sleep on average. Hearing: normal - bilateral.  Vision: vision problems: cataracts L, goes for regular eye exams, most recent eye exam <1 year ago, and wears glasses. Dental: regular dental visits and brushes teeth twice daily. /GYN Health  Patient is: postmenopausal  Last menstrual period: 5 years  Contraceptive method:  abstinate . Advanced Care Planning  Do you have an advanced directive? no  Do you have a durable medical power of ? no     Review of Systems:     Review of Systems   Constitutional:  Negative for chills and fever. Eyes:  Positive for visual disturbance (cataracts). Respiratory:  Negative for cough and shortness of breath. Cardiovascular:  Positive for palpitations. Negative for chest pain. Gastrointestinal:  Negative for abdominal pain, diarrhea, nausea and vomiting. Endocrine: Positive for cold intolerance and heat intolerance. Neurological:  Negative for dizziness, syncope and light-headedness. Psychiatric/Behavioral:  Negative for sleep disturbance.        Past Medical History:     Past Medical History:   Diagnosis Date    Anemia     Anxiety disorder     Bipolar disorder ([address])     Psychiatric disorder       Past Surgical History:     Past Surgical History:   Procedure Laterality Date     SECTION      1984, 1986, 80    DENTAL SURGERY      TUBAL LIGATION        Social History:     Social History     Socioeconomic History    Marital status: /Civil Union     Spouse name: None    Number of children: None    Years of education: None    Highest education level: None   Occupational History    None   Tobacco Use    Smoking status: Never    Smokeless tobacco: Never   Vaping Use    Vaping Use: Never used   Substance and Sexual Activity    Alcohol use: No    Drug use: No    Sexual activity: Not Currently     Partners: Male     Birth control/protection: Condom Female   Other Topics Concern    None   Social History Narrative    None     Social Determinants of Health     Financial Resource Strain: Medium Risk (2023)    Overall Financial Resource Strain (CARDIA)     Difficulty of Paying Living Expenses: Somewhat hard   Food Insecurity: Food Insecurity Present (2023)    Hunger Vital Sign     Worried About Running Out of Food in the Last Year: Sometimes true     Ran Out of Food in the Last Year: Sometimes true   Transportation Needs: No Transportation Needs (2023)    PRAPARE - Transportation     Lack of Transportation (Medical): No     Lack of Transportation (Non-Medical): No   Physical Activity: Insufficiently Active (2021)    Exercise Vital Sign     Days of Exercise per Week: 2 days     Minutes of Exercise per Session: 10 min   Stress: No Stress Concern Present (2021)    [facility]     Feeling of Stress : Only a little   Social Connections:  Moderately Integrated (2021)    Social Connection and Isolation Panel [NHANES]     Frequency of Communication with Friends and Family: Twice a week     Frequency of Social Gatherings with Friends and Family: More than three times a week     Attends [employer] Services: More than 4 times per year     Active Member of Clubs or Organizations: Yes     Attends Club or Organization Meetings: 1 to 4 times per year     Marital Status:    Intimate Partner Violence: Not At Risk (7/8/2021)    Humiliation, Afraid, Rape, and Kick questionnaire     Fear of Current or Ex-Partner: No     Emotionally Abused: No     Physically Abused: No     Sexually Abused: No   Housing Stability: Not on file      Family History:     Family History   Problem Relation Age of Onset    Depression Mother     Diabetes Mother     Arthritis Father     Bipolar disorder Son     Anxiety disorder Son     Depression Son     Anxiety disorder Daughter     Bipolar disorder Daughter     Depression Daughter       Current Medications:     Current Outpatient Medications   Medication Sig Dispense Refill    acetaminophen (TYLENOL) 325 mg tablet Take 2 tablets (650 mg total) by mouth every 6 (six) hours as needed for mild pain or fever 30 tablet 0    ALPRAZolam (XANAX) 0.5 mg tablet Take 1 mg by mouth 2 (two) times a day as needed for anxiety      amcinonide (CYCLOCORT) 0.1 % lotion Apply topically 2 (two) times a day Please use 2 times a day, preferably after you have showered and before bed, and spread evenly across the scalp. Please use this in combination with your medicated shampoos. This steroid lotion should be used for 3 weeks every day. 60 mL 0    Blood Pressure KIT by Does not apply route daily 1 each 0    buPROPion (WELLBUTRIN) 75 mg tablet Take 150 mg by mouth in the morning      clobetasol (TEMOVATE) 0.05 % external solution APPLY TOPICALLY 2 (TWO) TIMES A DAY TO SCALP FOR UP TO 2 WEEKS AT A TIME. PLEASE ALLOW ONE WEEK BREAK IN BETWEEN USES.  50 mL 1    famotidine (PEPCID) 40 MG tablet Take 1 tablet (40 mg total) by mouth 2 (two) times a day 60 tablet 2    ketoconazole (NIZORAL) 2 % shampoo Apply 1 Application topically 2 (two) times a week 120 mL 11    oxymetazoline (AFRIN) 0.05 % nasal spray 2 sprays by Each Nare route 2 (two) times a day for 3 days 1.2 mL 0    PARoxetine (PAXIL) 40 MG tablet Take 1 tablet (40 mg total) by mouth daily at bedtime 90 tablet 0    Vraylar 1.5 MG capsule Take 1.5 mg by mouth daily      zolpidem (AMBIEN) 10 mg tablet Take 10 mg by mouth daily at bedtime as needed      Skin Protectants, Misc. (eucerin) cream Apply topically as needed for wound care (Patient not taking: Reported on 7/8/2021) 397 g 0     No current facility-administered medications for this visit. Allergies:     No Known Allergies   Physical Exam:     /76 (BP Location: Left arm, Patient Position: Sitting, Cuff Size: Adult)   Pulse 91   Temp 98.1 °F (36.7 °C) (Temporal)   Ht 4' 11.5" (1.511 m)   Wt 83 kg (183 lb)   LMP  (LMP Unknown)   BMI 36.34 kg/m²     Physical Exam  Constitutional:       Appearance: She is obese. She is not ill-appearing. HENT:      Right Ear: Tympanic membrane, ear canal and external ear normal. There is no impacted cerumen. Left Ear: Tympanic membrane, ear canal and external ear normal. There is no impacted cerumen. Nose: Nose normal.      Mouth/Throat:      Mouth: Mucous membranes are moist.      Pharynx: No oropharyngeal exudate. Eyes:      Conjunctiva/sclera: Conjunctivae normal.      Pupils: Pupils are equal, round, and reactive to light. Neck:      Comments: Mild thyromegaly notes  Cardiovascular:      Rate and Rhythm: Normal rate. Heart sounds: No murmur heard. Pulmonary:      Effort: Pulmonary effort is normal.      Breath sounds: No wheezing or rales. Abdominal:      General: Bowel sounds are normal.      Tenderness: There is no abdominal tenderness. There is no guarding. Musculoskeletal:      Cervical back: Neck supple. No tenderness. Right lower leg: No edema. Left lower leg: No edema. Skin:     General: Skin is warm and dry.    Neurological:      Mental Status: She is alert. Deep Tendon Reflexes: Reflexes normal.      Comments: DTRs 2+ globally   Psychiatric:         Behavior: Behavior normal.         Thought Content:  Thought content normal.         Judgment: Judgment normal.      Comments: Depressed mood and affect          Adair Hernandez DO  [facility]

## 2023-10-18 ENCOUNTER — APPOINTMENT (OUTPATIENT)
Dept: LAB | Facility: CLINIC | Age: 63
End: 2023-10-18
Payer: COMMERCIAL

## 2023-10-18 DIAGNOSIS — E66.9 OBESITY (BMI 35.0-39.9 WITHOUT COMORBIDITY): ICD-10-CM

## 2023-10-18 DIAGNOSIS — Z00.00 ANNUAL PHYSICAL EXAM: ICD-10-CM

## 2023-10-18 LAB
25(OH)D3 SERPL-MCNC: 28.5 NG/ML (ref 30–100)
ALBUMIN SERPL BCP-MCNC: 4.5 G/DL (ref 3.5–5)
ALP SERPL-CCNC: 52 U/L (ref 34–104)
ALT SERPL W P-5'-P-CCNC: 17 U/L (ref 7–52)
ANION GAP SERPL CALCULATED.3IONS-SCNC: 4 MMOL/L
AST SERPL W P-5'-P-CCNC: 17 U/L (ref 13–39)
BILIRUB SERPL-MCNC: 0.56 MG/DL (ref 0.2–1)
BUN SERPL-MCNC: 10 MG/DL (ref 5–25)
CALCIUM SERPL-MCNC: 9.5 MG/DL (ref 8.4–10.2)
CHLORIDE SERPL-SCNC: 100 MMOL/L (ref 96–108)
CHOLEST SERPL-MCNC: 137 MG/DL
CO2 SERPL-SCNC: 30 MMOL/L (ref 21–32)
CREAT SERPL-MCNC: 0.64 MG/DL (ref 0.6–1.3)
ERYTHROCYTE [DISTWIDTH] IN BLOOD BY AUTOMATED COUNT: 12.3 % (ref 11.6–15.1)
EST. AVERAGE GLUCOSE BLD GHB EST-MCNC: 126 MG/DL
FERRITIN SERPL-MCNC: 44 NG/ML (ref 11–307)
GFR SERPL CREATININE-BSD FRML MDRD: 97 ML/MIN/1.73SQ M
GLUCOSE P FAST SERPL-MCNC: 94 MG/DL (ref 65–99)
HBA1C MFR BLD: 6 %
HCT VFR BLD AUTO: 40.8 % (ref 34.8–46.1)
HDLC SERPL-MCNC: 45 MG/DL
HGB BLD-MCNC: 13 G/DL (ref 11.5–15.4)
IRON SATN MFR SERPL: 20 % (ref 15–50)
IRON SERPL-MCNC: 72 UG/DL (ref 50–212)
LDLC SERPL CALC-MCNC: 76 MG/DL (ref 0–100)
MCH RBC QN AUTO: 28.6 PG (ref 26.8–34.3)
MCHC RBC AUTO-ENTMCNC: 31.9 G/DL (ref 31.4–37.4)
MCV RBC AUTO: 90 FL (ref 82–98)
NONHDLC SERPL-MCNC: 92 MG/DL
PLATELET # BLD AUTO: 204 THOUSANDS/UL (ref 149–390)
PMV BLD AUTO: 11.1 FL (ref 8.9–12.7)
POTASSIUM SERPL-SCNC: 4.4 MMOL/L (ref 3.5–5.3)
PROT SERPL-MCNC: 7.8 G/DL (ref 6.4–8.4)
RBC # BLD AUTO: 4.54 MILLION/UL (ref 3.81–5.12)
SODIUM SERPL-SCNC: 134 MMOL/L (ref 135–147)
T4 FREE SERPL-MCNC: 0.7 NG/DL (ref 0.61–1.12)
TIBC SERPL-MCNC: 358 UG/DL (ref 250–450)
TRIGL SERPL-MCNC: 78 MG/DL
TSH SERPL DL<=0.05 MIU/L-ACNC: 3.64 UIU/ML (ref 0.45–4.5)
UIBC SERPL-MCNC: 286 UG/DL (ref 155–355)
WBC # BLD AUTO: 5.49 THOUSAND/UL (ref 4.31–10.16)

## 2023-10-18 PROCEDURE — 80061 LIPID PANEL: CPT

## 2023-10-18 PROCEDURE — 83550 IRON BINDING TEST: CPT

## 2023-10-18 PROCEDURE — 85027 COMPLETE CBC AUTOMATED: CPT

## 2023-10-18 PROCEDURE — 84443 ASSAY THYROID STIM HORMONE: CPT

## 2023-10-18 PROCEDURE — 84439 ASSAY OF FREE THYROXINE: CPT

## 2023-10-18 PROCEDURE — 36415 COLL VENOUS BLD VENIPUNCTURE: CPT

## 2023-10-18 PROCEDURE — 80053 COMPREHEN METABOLIC PANEL: CPT

## 2023-10-18 PROCEDURE — 82306 VITAMIN D 25 HYDROXY: CPT

## 2023-10-18 PROCEDURE — 83540 ASSAY OF IRON: CPT

## 2023-10-18 PROCEDURE — 82728 ASSAY OF FERRITIN: CPT

## 2023-10-18 PROCEDURE — 83036 HEMOGLOBIN GLYCOSYLATED A1C: CPT

## 2023-10-20 DIAGNOSIS — E55.9 VITAMIN D DEFICIENCY: Primary | ICD-10-CM

## 2023-10-20 RX ORDER — ERGOCALCIFEROL 1.25 MG/1
50000 CAPSULE ORAL WEEKLY
Qty: 8 CAPSULE | Refills: 0 | Status: SHIPPED | OUTPATIENT
Start: 2023-10-20 | End: 2023-12-09

## 2023-12-06 DIAGNOSIS — E55.9 VITAMIN D DEFICIENCY: ICD-10-CM

## 2023-12-06 RX ORDER — ERGOCALCIFEROL 1.25 MG/1
CAPSULE ORAL
Qty: 8 CAPSULE | Refills: 0 | OUTPATIENT
Start: 2023-12-06

## 2023-12-11 PROBLEM — Z12.4 SCREENING FOR CERVICAL CANCER: Status: RESOLVED | Noted: 2023-10-12 | Resolved: 2023-12-11

## 2024-03-26 ENCOUNTER — TELEPHONE (OUTPATIENT)
Dept: INTERNAL MEDICINE CLINIC | Facility: CLINIC | Age: 64
End: 2024-03-26

## 2024-03-26 ENCOUNTER — CONSULT (OUTPATIENT)
Dept: INTERNAL MEDICINE CLINIC | Facility: CLINIC | Age: 64
End: 2024-03-26

## 2024-03-26 VITALS
HEIGHT: 60 IN | TEMPERATURE: 98.1 F | SYSTOLIC BLOOD PRESSURE: 147 MMHG | WEIGHT: 198 LBS | BODY MASS INDEX: 38.87 KG/M2 | DIASTOLIC BLOOD PRESSURE: 81 MMHG | HEART RATE: 114 BPM

## 2024-03-26 DIAGNOSIS — Z01.818 PREOP EXAMINATION: Primary | ICD-10-CM

## 2024-03-26 DIAGNOSIS — H33.22 LEFT RETINAL DETACHMENT: ICD-10-CM

## 2024-03-26 DIAGNOSIS — F32.4 MAJOR DEPRESSIVE DISORDER IN PARTIAL REMISSION, UNSPECIFIED WHETHER RECURRENT (HCC): ICD-10-CM

## 2024-03-26 PROCEDURE — 99213 OFFICE O/P EST LOW 20 MIN: CPT | Performed by: INTERNAL MEDICINE

## 2024-03-26 NOTE — PROGRESS NOTES
[facility]  INTERNAL MEDICINE OFFICE VISIT     PATIENT INFORMATION     Jolynn Guillory   63 y.o. female   MRN: [id number]    ASSESSMENT/PLAN     Diagnoses and all orders for this visit:    Left retinal detachment  Preop examination  Risk Factor Score (Yes=1, No=0)   Hx of TIA/CVA 0   Hx of prior ischemic heart disease (AMI, unstable angina, Q waves on EKG, CABG) 0   Hx of congestive heart failure 0   Serum Creatinine >2 mg/dl 0   Insulin dependent diabetes mellitus 0   Total Score 0     Risk of MACE (30-day)     Points Risk % (95% CI), Shree, 2017 Risk % (95% CI) Jonatan, 1999   0 3.9 (2.8-5.4) 0.4 (0.05-1.5)   1 6 (4.9-7.4) 0.9 (0.3-2.1)   2 10.1 (8.1-12.6) 6.6 (3.9-10.3)   3 or more 15 (11.1-20) >11 (5.8-18.4)       Advice: due to RCRI score 0, patient has a 3.9% 30-day risk of death, MI, or cardiac arrest or other MACE associated with this operation/procedure. The procedure is not considered high/elevated risk. Patient may proceed with the procedure in question without further cardiac evaluation. Final decision of whether or not the patient should undergo the procedure in question remains with the physician performing the procedure.          Major depressive disorder in partial remission, unspecified whether recurrent (HCC)  Patient reports that her depression is still significant. She recently saw her psychiatrist regarding this and her medication regimen has been adjusted in the chart to reflect what she is currently taking. Patient was instructed to continue with this regimen per her Psychiatrist and follow up as scheduled on 4/8. She does report that her depression is starting to improve. No suicidal or homicidal ideation at time of visit.      Schedule a follow-up appointment in 6 weeks for follow up depression.    HEALTH MAINTENANCE     Immunization History   Administered Date(s) Administered    COVID-19 PFIZER VACCINE 0.3 ML IM 04/01/2021, 04/22/2021    INFLUENZA  "11/02/2017, 09/15/2018    Influenza Quadrivalent Preservative Free 3 years and older IM 01/21/2016    Influenza, injectable, quadrivalent, preservative free 0.5 mL 09/15/2018    Influenza, recombinant, quadrivalent,injectable, preservative free 09/30/2020    Tdap 08/30/2016     CHIEF COMPLAINT     Chief Complaint   Patient presents with    Pre-op Exam     Left eye surgery 4/3      HISTORY OF PRESENT ILLNESS     Patient is a 62yo F with history of bipolar disorder, anemia, anxiety, GERD who presents for preop eval for left retina surgery on 4/3/2024. Patient has no history of MI, CVA. She denies any exertional chest pain or dyspnea, palpitations. She is able to do grocery shopping, cooking, cleaning with only mild difficulty related to ambulating.    On her online check in for today's visit, patient noted significant depression on PHQ screening. She last saw her psychiatrist 2 weeks ago and told them that her depression was \"very strong\". Our list includes Paroxetine 40, Buproprion 150, Vraylar 1.5, Ambien, Xanax. The  list that she is showing that she is compliant with includes: Ambien 10, Xanax 0.5, Abilify 5, Lexapro 10, Wellbutrin 300. No changes were made by her psychiatrist and she notes that she is feeling better as she is taking her medications. Next appointment with Dr. Niecy Anglin is on 4/8/2024 with [facility]. She currently declines any thoughts of harming herself, suicidal ideation, homicidal ideation.    REVIEW OF SYSTEMS     Review of Systems - 12 point review of systems completed and negative unless stated above.    OBJECTIVE     Vitals:    03/26/24 1327   BP: 147/81   BP Location: Left arm   Patient Position: Sitting   Cuff Size: Large   Pulse: (!) 114   Temp: 98.1 °F (36.7 °C)   TempSrc: Temporal   Weight: 89.8 kg (198 lb)   Height: 4' 11.5\" (1.511 m)     Physical Exam  Vitals reviewed.   Constitutional:       General: She is not in acute distress.     Appearance: Normal " appearance. She is well-developed.   HENT:      Head: Normocephalic and atraumatic.   Eyes:      Conjunctiva/sclera: Conjunctivae normal.   Cardiovascular:      Rate and Rhythm: Normal rate and regular rhythm.      Heart sounds: S1 normal and S2 normal. No murmur heard.  Pulmonary:      Effort: Pulmonary effort is normal. No respiratory distress.      Breath sounds: Normal breath sounds.   Abdominal:      General: Bowel sounds are normal. There is no distension.      Palpations: Abdomen is soft.      Tenderness: There is no abdominal tenderness.   Musculoskeletal:      Cervical back: Neck supple.      Right lower leg: No edema.      Left lower leg: No edema.   Skin:     General: Skin is warm and dry.   Neurological:      Mental Status: She is alert and oriented to person, place, and time.   Psychiatric:         Mood and Affect: Mood normal.         Thought Content: Thought content normal.       CURRENT MEDICATIONS     Current Outpatient Medications:     acetaminophen (TYLENOL) 325 mg tablet, Take 2 tablets (650 mg total) by mouth every 6 (six) hours as needed for mild pain or fever, Disp: 30 tablet, Rfl: 0    ALPRAZolam (XANAX) 0.5 mg tablet, Take 1 mg by mouth 2 (two) times a day as needed for anxiety, Disp: , Rfl:     amcinonide (CYCLOCORT) 0.1 % lotion, Apply topically 2 (two) times a day Please use 2 times a day, preferably after you have showered and before bed, and spread evenly across the scalp. Please use this in combination with your medicated shampoos. This steroid lotion should be used for 3 weeks every day., Disp: 60 mL, Rfl: 0    amoxicillin (AMOXIL) 875 mg tablet, Take 1 tablet (875 mg total) by mouth 2 (two) times a day for 10 days, Disp: 20 tablet, Rfl: 0    benzonatate (TESSALON PERLES) 100 mg capsule, 2 po q 8 hr prn cough, Disp: 30 capsule, Rfl: 0    Blood Pressure KIT, by Does not apply route daily, Disp: 1 each, Rfl: 0    buPROPion (WELLBUTRIN) 75 mg tablet, Take 150 mg by mouth in the morning,  Disp: , Rfl:     clobetasol (TEMOVATE) 0.05 % external solution, APPLY TOPICALLY 2 (TWO) TIMES A DAY TO SCALP FOR UP TO 2 WEEKS AT A TIME. PLEASE ALLOW ONE WEEK BREAK IN BETWEEN USES., Disp: 50 mL, Rfl: 1    famotidine (PEPCID) 40 MG tablet, Take 1 tablet (40 mg total) by mouth 2 (two) times a day, Disp: 60 tablet, Rfl: 2    ketoconazole (NIZORAL) 2 % shampoo, Apply 1 Application topically 2 (two) times a week, Disp: 120 mL, Rfl: 11    oxymetazoline (AFRIN) 0.05 % nasal spray, 2 sprays by Each Nare route 2 (two) times a day for 3 days, Disp: 1.2 mL, Rfl: 0    PARoxetine (PAXIL) 40 MG tablet, Take 1 tablet (40 mg total) by mouth daily at bedtime, Disp: 90 tablet, Rfl: 0    Vraylar 1.5 MG capsule, Take 1.5 mg by mouth daily, Disp: , Rfl:     zolpidem (AMBIEN) 10 mg tablet, Take 10 mg by mouth daily at bedtime as needed, Disp: , Rfl:     PAST MEDICAL & SURGICAL HISTORY     Past Medical History:   Diagnosis Date    Anemia     Anxiety disorder     Bipolar disorder (HCC)     Psychiatric disorder      Past Surgical History:   Procedure Laterality Date     SECTION      , ,     DENTAL SURGERY      TUBAL LIGATION       SOCIAL & FAMILY HISTORY     Social History     Socioeconomic History    Marital status: /Civil Union     Spouse name: Not on file    Number of children: Not on file    Years of education: Not on file    Highest education level: Not on file   Occupational History    Not on file   Tobacco Use    Smoking status: Never    Smokeless tobacco: Never   Vaping Use    Vaping status: Never Used   Substance and Sexual Activity    Alcohol use: No    Drug use: No    Sexual activity: Not Currently     Partners: Male     Birth control/protection: Condom Female   Other Topics Concern    Not on file   Social History Narrative    Not on file     Social Determinants of Health     Financial Resource Strain: Low Risk  (3/25/2024)    Overall Financial Resource Strain (CARDIA)     Difficulty of Paying Living  Expenses: Not very hard   Food Insecurity: No Food Insecurity (3/25/2024)    Hunger Vital Sign     Worried About Running Out of Food in the Last Year: Never true     Ran Out of Food in the Last Year: Never true   Transportation Needs: No Transportation Needs (3/25/2024)    PRAPARE - Transportation     Lack of Transportation (Medical): No     Lack of Transportation (Non-Medical): No   Physical Activity: Insufficiently Active (7/8/2021)    Exercise Vital Sign     Days of Exercise per Week: 2 days     Minutes of Exercise per Session: 10 min   Stress: No Stress Concern Present (7/8/2021)    Citizen of [place] [employer] of Occupational Health - Occupational Stress Questionnaire     Feeling of Stress : Only a little   Social Connections: Moderately Integrated (7/8/2021)    Social Connection and Isolation Panel [NHANES]     Frequency of Communication with Friends and Family: Twice a week     Frequency of Social Gatherings with Friends and Family: More than three times a week     Attends Christianity Services: More than 4 times per year     Active Member of Clubs or Organizations: Yes     Attends Club or Organization Meetings: 1 to 4 times per year     Marital Status:    Intimate Partner Violence: Not At Risk (7/8/2021)    Humiliation, Afraid, Rape, and Kick questionnaire     Fear of Current or Ex-Partner: No     Emotionally Abused: No     Physically Abused: No     Sexually Abused: No   Housing Stability: Low Risk  (3/25/2024)    Housing Stability Vital Sign     Unable to Pay for Housing in the Last Year: No     Number of Places Lived in the Last Year: 1     Unstable Housing in the Last Year: No     Social History     Substance and Sexual Activity   Alcohol Use No       Social History     Substance and Sexual Activity   Drug Use No     Social History     Tobacco Use   Smoking Status Never   Smokeless Tobacco Never     Family History   Problem Relation Age of Onset    Depression Mother     Diabetes Mother     Arthritis Father      Bipolar disorder Son     Anxiety disorder Son     Depression Son     Anxiety disorder Daughter     Bipolar disorder Daughter     Depression Daughter                ==  Rc Beck DO  PGY-3  [facility] Internal Medicine Residency    [place] [address], [address]  [place] [address]  Office: [phone]  Fax: [phone]

## 2024-03-27 ENCOUNTER — OFFICE VISIT (OUTPATIENT)
Dept: INTERNAL MEDICINE CLINIC | Facility: CLINIC | Age: 64
End: 2024-03-27

## 2024-03-27 VITALS
BODY MASS INDEX: 38.87 KG/M2 | HEIGHT: 60 IN | SYSTOLIC BLOOD PRESSURE: 134 MMHG | WEIGHT: 198 LBS | TEMPERATURE: 98.1 F | DIASTOLIC BLOOD PRESSURE: 82 MMHG | HEART RATE: 101 BPM

## 2024-03-27 DIAGNOSIS — J01.00 ACUTE MAXILLARY SINUSITIS, RECURRENCE NOT SPECIFIED: Primary | ICD-10-CM

## 2024-03-27 PROBLEM — M25.562 LEFT KNEE PAIN: Status: RESOLVED | Noted: 2020-10-28 | Resolved: 2024-03-27

## 2024-03-27 PROBLEM — M25.471 SWELLING OF ANKLE, RIGHT: Status: RESOLVED | Noted: 2022-04-01 | Resolved: 2024-03-27

## 2024-03-27 PROCEDURE — 99213 OFFICE O/P EST LOW 20 MIN: CPT | Performed by: FAMILY MEDICINE

## 2024-03-27 RX ORDER — ESCITALOPRAM OXALATE 10 MG/1
10 TABLET ORAL DAILY
COMMUNITY

## 2024-03-27 RX ORDER — ARIPIPRAZOLE 5 MG/1
5 TABLET ORAL DAILY
COMMUNITY

## 2024-03-27 RX ORDER — BENZONATATE 100 MG/1
CAPSULE ORAL
Qty: 30 CAPSULE | Refills: 0 | Status: SHIPPED | OUTPATIENT
Start: 2024-03-27

## 2024-03-27 RX ORDER — AMOXICILLIN 875 MG/1
875 TABLET, COATED ORAL 2 TIMES DAILY
Qty: 20 TABLET | Refills: 0 | Status: SHIPPED | OUTPATIENT
Start: 2024-03-27 | End: 2024-04-06

## 2024-03-27 NOTE — ASSESSMENT & PLAN NOTE
Please get as much rest as possible , drink plenty of fluids , tea with honey and lemon , nasal saline instillations liberally , you may take tylenol or ibuprofen for aches , fever , start amoxicillin and tessalon perles , call and reschedule an appointment if you are not improving or have concern

## 2024-03-27 NOTE — PROGRESS NOTES
Name: Jolynn Guillory      : 1960      MRN: [id number]  Encounter Provider: Heather Pineda MD  Encounter Date: 3/27/2024   Encounter department: [facility]    Assessment & Plan     1. Acute maxillary sinusitis, recurrence not specified  Assessment & Plan:  Please get as much rest as possible , drink plenty of fluids , tea with honey and lemon , nasal saline instillations liberally , you may take tylenol or ibuprofen for aches , fever , start amoxicillin and tessalon perles , call and reschedule an appointment if you are not improving or have concern       Orders:  -     amoxicillin (AMOXIL) 875 mg tablet; Take 1 tablet (875 mg total) by mouth 2 (two) times a day for 10 days  -     benzonatate (TESSALON PERLES) 100 mg capsule; 2 po q 8 hr prn cough           Subjective     Cough  Associated symptoms include rhinorrhea and a sore throat. Pertinent negatives include no chest pain, chills, ear pain, fever, rash, shortness of breath or wheezing.    Pt here c/o head congestion runny nose , cough sx started 2 weeks ,  acting as interpretor ? Fever ,  had been sick similar sx , she continues with sinus congestion , nasal mucous yellow , + post nasal drip , feels there is sputum , coughing at night as well - wheezing , she has taken Dayquil   Review of Systems   Constitutional:  Negative for chills and fever.   HENT:  Positive for congestion, rhinorrhea, sinus pressure and sore throat. Negative for ear pain.    Eyes:  Negative for pain and visual disturbance.   Respiratory:  Positive for cough. Negative for shortness of breath and wheezing.    Cardiovascular:  Negative for chest pain and palpitations.   Gastrointestinal:  Negative for abdominal pain and vomiting.   Genitourinary:  Negative for dysuria and hematuria.   Musculoskeletal:  Negative for arthralgias and back pain.   Skin:  Negative for color change and rash.   Neurological:  Negative for seizures and  syncope.   Psychiatric/Behavioral:  Positive for sleep disturbance.         Due to cough    All other systems reviewed and are negative.      Past Medical History:   Diagnosis Date    Anemia     Anxiety disorder     Bipolar disorder (HCC)     Psychiatric disorder      Past Surgical History:   Procedure Laterality Date     SECTION      , ,     DENTAL SURGERY      TUBAL LIGATION       Family History   Problem Relation Age of Onset    Depression Mother     Diabetes Mother     Arthritis Father     Bipolar disorder Son     Anxiety disorder Son     Depression Son     Anxiety disorder Daughter     Bipolar disorder Daughter     Depression Daughter      Social History     Socioeconomic History    Marital status: /Civil Union     Spouse name: None    Number of children: None    Years of education: None    Highest education level: None   Occupational History    None   Tobacco Use    Smoking status: Never    Smokeless tobacco: Never   Vaping Use    Vaping status: Never Used   Substance and Sexual Activity    Alcohol use: No    Drug use: No    Sexual activity: Not Currently     Partners: Male     Birth control/protection: Condom Female   Other Topics Concern    None   Social History Narrative    None     Social Determinants of Health     Financial Resource Strain: Low Risk  (3/25/2024)    Overall Financial Resource Strain (CARDIA)     Difficulty of Paying Living Expenses: Not very hard   Food Insecurity: No Food Insecurity (3/25/2024)    Hunger Vital Sign     Worried About Running Out of Food in the Last Year: Never true     Ran Out of Food in the Last Year: Never true   Transportation Needs: No Transportation Needs (3/25/2024)    PRAPARE - Transportation     Lack of Transportation (Medical): No     Lack of Transportation (Non-Medical): No   Physical Activity: Insufficiently Active (2021)    Exercise Vital Sign     Days of Exercise per Week: 2 days     Minutes of Exercise per Session: 10 min    Stress: No Stress Concern Present (7/8/2021)    [place] [employer] Occupational Health - Occupational Stress Questionnaire     Feeling of Stress : Only a little   Social Connections: Moderately Integrated (7/8/2021)    Social Connection and Isolation Panel [NHANES]     Frequency of Communication with Friends and Family: Twice a week     Frequency of Social Gatherings with Friends and Family: More than three times a week     Attends Evangelical Services: More than 4 times per year     Active Member of Clubs or Organizations: Yes     Attends Club or Organization Meetings: 1 to 4 times per year     Marital Status:    Intimate Partner Violence: Not At Risk (7/8/2021)    Humiliation, Afraid, Rape, and Kick questionnaire     Fear of Current or Ex-Partner: No     Emotionally Abused: No     Physically Abused: No     Sexually Abused: No   Housing Stability: Low Risk  (3/25/2024)    Housing Stability Vital Sign     Unable to Pay for Housing in the Last Year: No     Number of Places Lived in the Last Year: 1     Unstable Housing in the Last Year: No     Current Outpatient Medications on File Prior to Visit   Medication Sig    acetaminophen (TYLENOL) 325 mg tablet Take 2 tablets (650 mg total) by mouth every 6 (six) hours as needed for mild pain or fever    ALPRAZolam (XANAX) 0.5 mg tablet Take 1 mg by mouth 2 (two) times a day as needed for anxiety    amcinonide (CYCLOCORT) 0.1 % lotion Apply topically 2 (two) times a day Please use 2 times a day, preferably after you have showered and before bed, and spread evenly across the scalp. Please use this in combination with your medicated shampoos. This steroid lotion should be used for 3 weeks every day.    ARIPiprazole (ABILIFY) 5 mg tablet Take 5 mg by mouth daily    Blood Pressure KIT by Does not apply route daily    buPROPion (WELLBUTRIN) 75 mg tablet Take 300 mg by mouth in the morning    clobetasol (TEMOVATE) 0.05 % external solution APPLY TOPICALLY 2 (TWO) TIMES A  "DAY TO SCALP FOR UP TO 2 WEEKS AT A TIME. PLEASE ALLOW ONE WEEK BREAK IN BETWEEN USES.    escitalopram (LEXAPRO) 10 mg tablet Take 10 mg by mouth daily    famotidine (PEPCID) 40 MG tablet Take 1 tablet (40 mg total) by mouth 2 (two) times a day    ketoconazole (NIZORAL) 2 % shampoo Apply 1 Application topically 2 (two) times a week    oxymetazoline (AFRIN) 0.05 % nasal spray 2 sprays by Each Nare route 2 (two) times a day for 3 days    zolpidem (AMBIEN) 10 mg tablet Take 10 mg by mouth daily at bedtime as needed    [DISCONTINUED] PARoxetine (PAXIL) 40 MG tablet Take 1 tablet (40 mg total) by mouth daily at bedtime    [DISCONTINUED] Skin Protectants, Misc. (eucerin) cream Apply topically as needed for wound care (Patient not taking: Reported on 7/8/2021)    [DISCONTINUED] Vraylar 1.5 MG capsule Take 1.5 mg by mouth daily     No Known Allergies  Immunization History   Administered Date(s) Administered    COVID-19 PFIZER VACCINE 0.3 ML IM 04/01/2021, 04/22/2021    INFLUENZA 11/02/2017, 09/15/2018    Influenza Quadrivalent Preservative Free 3 years and older IM 01/21/2016    Influenza, injectable, quadrivalent, preservative free 0.5 mL 09/15/2018    Influenza, recombinant, quadrivalent,injectable, preservative free 09/30/2020    Tdap 08/30/2016       Objective     /82 (BP Location: Right arm, Patient Position: Sitting, Cuff Size: Large)   Pulse 101   Temp 98.1 °F (36.7 °C) (Temporal)   Ht 4' 11.5\" (1.511 m)   Wt 89.8 kg (198 lb)   LMP  (LMP Unknown)   BMI 39.32 kg/m²     Physical Exam  Constitutional:       Comments: Skin with good color turgor , well hydrated ,no distress noted     HENT:      Head: Normocephalic and atraumatic.   Cardiovascular:      Rate and Rhythm: Normal rate and regular rhythm.      Heart sounds: Normal heart sounds.   Pulmonary:      Breath sounds: Normal breath sounds.   Lymphadenopathy:      Cervical:      Right cervical: No superficial cervical adenopathy.     Left cervical: No " superficial cervical adenopathy.   Skin:     General: Skin is warm and dry.   Neurological:      Comments: Non focal exam    Psychiatric:         Attention and Perception: Attention normal.         Mood and Affect: Mood normal.         Speech: Speech normal.         Behavior: Behavior normal.         Thought Content: Thought content normal.       Heather Pineda MD

## 2024-03-28 NOTE — TELEPHONE ENCOUNTER
[facility] office called and said the form that was faxed, needs a provider signature, and it needs to say if patient is cleared or not for surgery.    Printed out and gave to Gia Fofana is in office 3/29 to complete.     Once completed, please scan in chart again once faxed.

## 2024-04-11 ENCOUNTER — OFFICE VISIT (OUTPATIENT)
Dept: INTERNAL MEDICINE CLINIC | Facility: CLINIC | Age: 64
End: 2024-04-11

## 2024-04-11 VITALS
TEMPERATURE: 98.3 F | WEIGHT: 194 LBS | HEART RATE: 91 BPM | HEIGHT: 57 IN | RESPIRATION RATE: 18 BRPM | DIASTOLIC BLOOD PRESSURE: 86 MMHG | BODY MASS INDEX: 41.85 KG/M2 | SYSTOLIC BLOOD PRESSURE: 135 MMHG | OXYGEN SATURATION: 97 %

## 2024-04-11 DIAGNOSIS — R53.83 OTHER FATIGUE: Primary | ICD-10-CM

## 2024-04-11 DIAGNOSIS — F32.4 MAJOR DEPRESSIVE DISORDER IN PARTIAL REMISSION, UNSPECIFIED WHETHER RECURRENT (HCC): ICD-10-CM

## 2024-04-11 PROCEDURE — 99213 OFFICE O/P EST LOW 20 MIN: CPT | Performed by: INTERNAL MEDICINE

## 2024-04-11 NOTE — PROGRESS NOTES
Assessment/Plan:    No problem-specific Assessment & Plan notes found for this encounter.       Diagnoses and all orders for this visit:    Other fatigue  -     TSH, 3rd generation with Free T4 reflex; Future  -     CBC and differential; Future  -     Comprehensive metabolic panel; Future    Major depressive disorder in partial remission, unspecified whether recurrent (HCC)        Patient has 2-day history of fatigue since starting increased dose of bupropion 300 mg additionally to Lexapro, Abilify, Ambien, Xanax.  Associated with tremors.  On exam no coarse or fine tremors, finger-to-nose testing normal, gait is normal, some slowed speech.  No clonus on exam.  Differentials include polypharmacy, hypothyroidism, metabolic/electrolyte abnormality, less likely serotonin syndrome though possible with multiple psychiatric medications and slight serotonergic effect of bupropion.  Discussed with patient to continue off of bupropion until she calls psychiatrist to discuss dose adjustments --encourage patient to discuss soon as possible as she may face adverse effects from discontinuing bupropion.  We will obtain thyroid studies and basic labs.  Subjective:      Patient ID: Jolynn Guillory is a 63 y.o. female.    HPI patient is a 63-year-old female with past medical history of prediabetes, psoriasis, obesity, GERD, depression presenting for 2-day history of fatigue.  She states that this fatigue started after she started taking new dose of medications on bupropion 300 mg which was recently increased at her last psychiatry appointment.  She also experienced 2 days of feeling shaky and tremors which she does not have at this time.  She has discontinued use of her bupropion.  She states that she has had normal sleep patterns 8 hours a day which has not changed since her fatigue.  Denies any cold-like symptoms like cough, rhinorrhea, congestion, sore throat, fever and no B symptoms such as weight loss, night sweats, chills.   "Otherwise does not have any loss of appetite.  Denies any heat intolerance, weight changes, palpitations, chest pain, difficulty concentrating-/does endorse anxiety which she has at baseline.  Denies any other symptoms.    Patient continues to take Xanax, Lexapro, Abilify, Ambien, which are additional antidepressive medication.    The following portions of the patient's history were reviewed and updated as appropriate: allergies, current medications, past family history, past medical history, past social history, past surgical history, and problem list.    Review of Systems   Constitutional:  Positive for fatigue. Negative for appetite change, chills, diaphoresis, fever and unexpected weight change.   HENT: Negative.  Negative for ear pain and sore throat.    Eyes:  Negative for pain and visual disturbance.   Respiratory: Negative.  Negative for cough and shortness of breath.    Cardiovascular:  Negative for chest pain, palpitations and leg swelling.   Gastrointestinal:  Negative for abdominal pain.   Genitourinary:  Negative for dysuria and hematuria.   Musculoskeletal:  Negative for arthralgias and back pain.   Skin:  Negative for color change and rash.   Neurological:  Negative for seizures and syncope.   Psychiatric/Behavioral:  Positive for dysphoric mood. Negative for behavioral problems, confusion, decreased concentration and sleep disturbance. The patient is nervous/anxious.    All other systems reviewed and are negative.        Objective:      /86   Pulse 91   Temp 98.3 °F (36.8 °C) (Temporal)   Resp 18   Ht 4' 9\" (1.448 m)   Wt 88 kg (194 lb)   LMP  (LMP Unknown)   SpO2 97%   BMI 41.98 kg/m²          Physical Exam      "

## 2024-04-14 ENCOUNTER — APPOINTMENT (OUTPATIENT)
Dept: LAB | Facility: CLINIC | Age: 64
End: 2024-04-14
Payer: COMMERCIAL

## 2024-04-14 DIAGNOSIS — R53.83 OTHER FATIGUE: ICD-10-CM

## 2024-04-14 LAB
ALBUMIN SERPL BCP-MCNC: 4.6 G/DL (ref 3.5–5)
ALP SERPL-CCNC: 55 U/L (ref 34–104)
ALT SERPL W P-5'-P-CCNC: 23 U/L (ref 7–52)
ANION GAP SERPL CALCULATED.3IONS-SCNC: 6 MMOL/L (ref 4–13)
AST SERPL W P-5'-P-CCNC: 19 U/L (ref 13–39)
BASOPHILS # BLD AUTO: 0.03 THOUSANDS/ÂΜL (ref 0–0.1)
BASOPHILS NFR BLD AUTO: 0 % (ref 0–1)
BILIRUB SERPL-MCNC: 0.62 MG/DL (ref 0.2–1)
BUN SERPL-MCNC: 10 MG/DL (ref 5–25)
CALCIUM SERPL-MCNC: 9.6 MG/DL (ref 8.4–10.2)
CHLORIDE SERPL-SCNC: 99 MMOL/L (ref 96–108)
CO2 SERPL-SCNC: 29 MMOL/L (ref 21–32)
CREAT SERPL-MCNC: 0.63 MG/DL (ref 0.6–1.3)
EOSINOPHIL # BLD AUTO: 0.03 THOUSAND/ÂΜL (ref 0–0.61)
EOSINOPHIL NFR BLD AUTO: 0 % (ref 0–6)
ERYTHROCYTE [DISTWIDTH] IN BLOOD BY AUTOMATED COUNT: 12.3 % (ref 11.6–15.1)
GFR SERPL CREATININE-BSD FRML MDRD: 97 ML/MIN/1.73SQ M
GLUCOSE P FAST SERPL-MCNC: 97 MG/DL (ref 65–99)
HCT VFR BLD AUTO: 41.7 % (ref 34.8–46.1)
HGB BLD-MCNC: 13.6 G/DL (ref 11.5–15.4)
IMM GRANULOCYTES # BLD AUTO: 0.03 THOUSAND/UL (ref 0–0.2)
IMM GRANULOCYTES NFR BLD AUTO: 0 % (ref 0–2)
LYMPHOCYTES # BLD AUTO: 1.14 THOUSANDS/ÂΜL (ref 0.6–4.47)
LYMPHOCYTES NFR BLD AUTO: 17 % (ref 14–44)
MCH RBC QN AUTO: 28.6 PG (ref 26.8–34.3)
MCHC RBC AUTO-ENTMCNC: 32.6 G/DL (ref 31.4–37.4)
MCV RBC AUTO: 88 FL (ref 82–98)
MONOCYTES # BLD AUTO: 0.45 THOUSAND/ÂΜL (ref 0.17–1.22)
MONOCYTES NFR BLD AUTO: 7 % (ref 4–12)
NEUTROPHILS # BLD AUTO: 5.07 THOUSANDS/ÂΜL (ref 1.85–7.62)
NEUTS SEG NFR BLD AUTO: 76 % (ref 43–75)
NRBC BLD AUTO-RTO: 0 /100 WBCS
PLATELET # BLD AUTO: 226 THOUSANDS/UL (ref 149–390)
PMV BLD AUTO: 11.2 FL (ref 8.9–12.7)
POTASSIUM SERPL-SCNC: 4.2 MMOL/L (ref 3.5–5.3)
PROT SERPL-MCNC: 8.2 G/DL (ref 6.4–8.4)
RBC # BLD AUTO: 4.75 MILLION/UL (ref 3.81–5.12)
SODIUM SERPL-SCNC: 134 MMOL/L (ref 135–147)
TSH SERPL DL<=0.05 MIU/L-ACNC: 3.38 UIU/ML (ref 0.45–4.5)
WBC # BLD AUTO: 6.75 THOUSAND/UL (ref 4.31–10.16)

## 2024-04-14 PROCEDURE — 84443 ASSAY THYROID STIM HORMONE: CPT

## 2024-04-14 PROCEDURE — 36415 COLL VENOUS BLD VENIPUNCTURE: CPT

## 2024-04-14 PROCEDURE — 85025 COMPLETE CBC W/AUTO DIFF WBC: CPT

## 2024-04-14 PROCEDURE — 80053 COMPREHEN METABOLIC PANEL: CPT

## 2024-04-23 ENCOUNTER — CONSULT (OUTPATIENT)
Dept: MULTI SPECIALTY CLINIC | Facility: CLINIC | Age: 64
End: 2024-04-23

## 2024-04-23 ENCOUNTER — TELEPHONE (OUTPATIENT)
Dept: INTERNAL MEDICINE CLINIC | Facility: CLINIC | Age: 64
End: 2024-04-23

## 2024-04-23 VITALS
OXYGEN SATURATION: 97 % | TEMPERATURE: 98.1 F | RESPIRATION RATE: 18 BRPM | BODY MASS INDEX: 43.47 KG/M2 | WEIGHT: 193.25 LBS | DIASTOLIC BLOOD PRESSURE: 75 MMHG | HEART RATE: 75 BPM | SYSTOLIC BLOOD PRESSURE: 122 MMHG | HEIGHT: 56 IN

## 2024-04-23 DIAGNOSIS — M25.50 ARTHRALGIA, UNSPECIFIED JOINT: ICD-10-CM

## 2024-04-23 DIAGNOSIS — L40.9 PSORIASIS: Primary | ICD-10-CM

## 2024-04-23 NOTE — TELEPHONE ENCOUNTER
Folder Color-  Blue    Name of Form- Department of Human Services  Physician Certification form    Form to be filled out by-  Dr Cooney    Form to be Faxed [phone]    Form sent via [employer]

## 2024-04-23 NOTE — PROGRESS NOTES
[facility]  RHEUMATOLOGY OFFICE VISIT     PATIENT INFORMATION     Jolynn Guillory   63 y.o. female   MRN: [id number]    ASSESSMENT/PLAN     Is a 63-year-old woman with a history of psoriasis who comes here from referral by dermatology for possible psoriatic arthritis.  Of note patient has had bilateral knee pain for the past year or so, around the same time she was diagnosed with psoriasis.    However upon further review of patient's natural history and per physical exam, we believe it is more likely that patient is suffering from patellofemoral OA.  Her psoriasis is currently well-controlled, affecting about 1% of BSA at this time.  Furthermore her joint pain which is present mostly throughout the day is more indicative of OA.  For this reason we will get bilateral XR of the knees & begin treatment with Voltaren 1% 4 times daily.  She may return to follow-up with rheumatology in 6 months, where we will further determine cause of her arthralgia.    Diagnoses and all orders for this visit:    Psoriasis  -     XR knee 3 vw left non injury; Future  -     XR knee 3 vw right non injury; Future    Arthralgia, unspecified joint  -     XR knee 3 vw left non injury; Future  -     XR knee 3 vw right non injury; Future  -     Diclofenac Sodium (VOLTAREN) 1 %; Apply 4 g topically 4 (four) times a day      Schedule a follow-up appointment with Rheumatology clinic in 6 months.      HEALTH MAINTENANCE     Immunization History   Administered Date(s) Administered   • COVID-19 PFIZER VACCINE 0.3 ML IM 04/01/2021, 04/22/2021   • INFLUENZA 11/02/2017, 09/15/2018   • Influenza Quadrivalent Preservative Free 3 years and older IM 01/21/2016   • Influenza, injectable, quadrivalent, preservative free 0.5 mL 09/15/2018   • Influenza, recombinant, quadrivalent,injectable, preservative free 09/30/2020   • Tdap 08/30/2016       CHIEF COMPLAINT     Chief Complaint   Patient presents with   • Consult     "    HISTORY OF PRESENT ILLNESS     Today Miss Guillory is seen in clinic accompained by her . She states she is still having MSK pain, in particular in both of her legs. This pain is particularly found in her knees. This pain is present mostly in the mornings and during the day, and improves when she becomes more active throughout the day.     She states that her psoriasis has been well-controlled, currently without any rashes or intense pruritus. She was dx'd with psoriasis about 1 year ago, which is around the same time that this joint pain also came on. Will occasionally take tylenol for this knee pain, and will also occasionally use a walker for ambulation b/c/o pain as well. Denies any recent or prior trauma to her knees, nor any fx or orthopedic surgery to this area as well.     Patient does have Fhx of arthritis.    REVIEW OF SYSTEMS     Review of Systems   All other systems reviewed and are negative.      OBJECTIVE     Vitals:    04/23/24 1421   BP: 122/75   Pulse: 75   Resp: 18   Temp: 98.1 °F (36.7 °C)   TempSrc: Temporal   SpO2: 97%   Weight: 87.7 kg (193 lb 4 oz)   Height: 4' 8\" (1.422 m)     Physical Exam  Vitals and nursing note reviewed.   Constitutional:       General: She is not in acute distress.     Appearance: She is well-developed.   HENT:      Head: Normocephalic and atraumatic.   Eyes:      Conjunctiva/sclera: Conjunctivae normal.   Cardiovascular:      Rate and Rhythm: Normal rate and regular rhythm.      Heart sounds: No murmur heard.  Pulmonary:      Effort: Pulmonary effort is normal. No respiratory distress.      Breath sounds: Normal breath sounds.   Abdominal:      Palpations: Abdomen is soft.      Tenderness: There is no abdominal tenderness.   Musculoskeletal:         General: No swelling or tenderness.      Cervical back: Neck supple.      Comments: No synovitis  Knees are non-erythematous   Skin:     General: Skin is warm.      Capillary Refill: Capillary refill takes less than " 2 seconds.      Findings: Rash (Around scalp) present.   Neurological:      Mental Status: She is alert.   Psychiatric:         Mood and Affect: Mood normal.         CURRENT MEDICATIONS     Current Outpatient Medications:   •  Diclofenac Sodium (VOLTAREN) 1 %, Apply 4 g topically 4 (four) times a day, Disp: 480 g, Rfl: 0  •  acetaminophen (TYLENOL) 325 mg tablet, Take 2 tablets (650 mg total) by mouth every 6 (six) hours as needed for mild pain or fever, Disp: 30 tablet, Rfl: 0  •  ALPRAZolam (XANAX) 0.5 mg tablet, Take 1 mg by mouth 2 (two) times a day as needed for anxiety, Disp: , Rfl:   •  amcinonide (CYCLOCORT) 0.1 % lotion, Apply topically 2 (two) times a day Please use 2 times a day, preferably after you have showered and before bed, and spread evenly across the scalp. Please use this in combination with your medicated shampoos. This steroid lotion should be used for 3 weeks every day., Disp: 60 mL, Rfl: 0  •  ARIPiprazole (ABILIFY) 5 mg tablet, Take 5 mg by mouth daily, Disp: , Rfl:   •  benzonatate (TESSALON PERLES) 100 mg capsule, 2 po q 8 hr prn cough, Disp: 30 capsule, Rfl: 0  •  Blood Pressure KIT, by Does not apply route daily, Disp: 1 each, Rfl: 0  •  buPROPion (WELLBUTRIN) 75 mg tablet, Take 300 mg by mouth in the morning, Disp: , Rfl:   •  clobetasol (TEMOVATE) 0.05 % external solution, APPLY TOPICALLY 2 (TWO) TIMES A DAY TO SCALP FOR UP TO 2 WEEKS AT A TIME. PLEASE ALLOW ONE WEEK BREAK IN BETWEEN USES., Disp: 50 mL, Rfl: 1  •  escitalopram (LEXAPRO) 10 mg tablet, Take 10 mg by mouth daily, Disp: , Rfl:   •  famotidine (PEPCID) 40 MG tablet, Take 1 tablet (40 mg total) by mouth 2 (two) times a day, Disp: 60 tablet, Rfl: 2  •  ketoconazole (NIZORAL) 2 % shampoo, Apply 1 Application topically 2 (two) times a week, Disp: 120 mL, Rfl: 11  •  oxymetazoline (AFRIN) 0.05 % nasal spray, 2 sprays by Each Nare route 2 (two) times a day for 3 days, Disp: 1.2 mL, Rfl: 0  •  zolpidem (AMBIEN) 10 mg tablet, Take  10 mg by mouth daily at bedtime as needed, Disp: , Rfl:     PAST MEDICAL & SURGICAL HISTORY     Past Medical History:   Diagnosis Date   • Anemia    • Anxiety disorder    • Bipolar disorder (HCC)    • Psychiatric disorder      Past Surgical History:   Procedure Laterality Date   •  SECTION      , ,    • DENTAL SURGERY     • TUBAL LIGATION         SOCIAL & FAMILY HISTORY     Social History     Socioeconomic History   • Marital status: /Civil Union     Spouse name: Not on file   • Number of children: Not on file   • Years of education: Not on file   • Highest education level: Not on file   Occupational History   • Not on file   Tobacco Use   • Smoking status: Never   • Smokeless tobacco: Never   Vaping Use   • Vaping status: Never Used   Substance and Sexual Activity   • Alcohol use: No   • Drug use: No   • Sexual activity: Not Currently     Partners: Male     Birth control/protection: Condom Female   Other Topics Concern   • Not on file   Social History Narrative   • Not on file     Social Determinants of Health     Financial Resource Strain: Low Risk  (3/25/2024)    Overall Financial Resource Strain (CARDIA)    • Difficulty of Paying Living Expenses: Not very hard   Food Insecurity: No Food Insecurity (3/25/2024)    Hunger Vital Sign    • Worried About Running Out of Food in the Last Year: Never true    • Ran Out of Food in the Last Year: Never true   Transportation Needs: No Transportation Needs (3/25/2024)    PRAPARE - Transportation    • Lack of Transportation (Medical): No    • Lack of Transportation (Non-Medical): No   Physical Activity: Insufficiently Active (2021)    Exercise Vital Sign    • Days of Exercise per Week: 2 days    • Minutes of Exercise per Session: 10 min   Stress: No Stress Concern Present (2021)    [employer] [place] [employer] - Occupational Stress Questionnaire    • Feeling of Stress : Only a little   Social Connections: Moderately Integrated  (7/8/2021)    Social Connection and Isolation Panel [NHANES]    • Frequency of Communication with Friends and Family: Twice a week    • Frequency of Social Gatherings with Friends and Family: More than three times a week    • Attends [employer]ism Services: More than 4 times per year    • Active Member of Clubs or Organizations: Yes    • Attends Club or Organization Meetings: 1 to 4 times per year    • Marital Status:    Intimate Partner Violence: Not At Risk (7/8/2021)    Humiliation, Afraid, Rape, and Kick questionnaire    • Fear of Current or Ex-Partner: No    • Emotionally Abused: No    • Physically Abused: No    • Sexually Abused: No   Housing Stability: Low Risk  (3/25/2024)    Housing Stability Vital Sign    • Unable to Pay for Housing in the Last Year: No    • Number of Places Lived in the Last Year: 1    • Unstable Housing in the Last Year: No     Social History     Substance and Sexual Activity   Alcohol Use No       Social History     Substance and Sexual Activity   Drug Use No     Social History     Tobacco Use   Smoking Status Never   Smokeless Tobacco Never     Family History   Problem Relation Age of Onset   • Depression Mother    • Diabetes Mother    • Arthritis Father    • Bipolar disorder Son    • Anxiety disorder Son    • Depression Son    • Anxiety disorder Daughter    • Bipolar disorder Daughter    • Depression Daughter        = == == == == == == == == == == == == == == == == == == == == == == == == == == == == == ==    Dewey Hodgson MD  Internal Medicine Resident, PGY-2  [facility], [address], [address]  [place] [address]  Office: [phone]  Fax: [phone]

## 2024-05-01 PROBLEM — J01.00 ACUTE MAXILLARY SINUSITIS: Status: RESOLVED | Noted: 2024-03-27 | Resolved: 2024-05-01

## 2024-05-15 ENCOUNTER — VBI (OUTPATIENT)
Dept: ADMINISTRATIVE | Facility: OTHER | Age: 64
End: 2024-05-15

## 2024-05-24 ENCOUNTER — TELEPHONE (OUTPATIENT)
Dept: INTERNAL MEDICINE CLINIC | Facility: CLINIC | Age: 64
End: 2024-05-24

## 2024-05-24 NOTE — TELEPHONE ENCOUNTER
Received voice message from patient/. Attempted to reach patient at . Interpretor ID [id number]. Unable to leave a voice message.

## 2024-05-28 ENCOUNTER — OFFICE VISIT (OUTPATIENT)
Dept: INTERNAL MEDICINE CLINIC | Facility: CLINIC | Age: 64
End: 2024-05-28

## 2024-05-28 VITALS
BODY MASS INDEX: 43.36 KG/M2 | OXYGEN SATURATION: 99 % | HEART RATE: 91 BPM | WEIGHT: 193.4 LBS | DIASTOLIC BLOOD PRESSURE: 81 MMHG | TEMPERATURE: 98.1 F | SYSTOLIC BLOOD PRESSURE: 136 MMHG

## 2024-05-28 DIAGNOSIS — F32.4 MAJOR DEPRESSIVE DISORDER IN PARTIAL REMISSION, UNSPECIFIED WHETHER RECURRENT (HCC): ICD-10-CM

## 2024-05-28 DIAGNOSIS — H33.22 LEFT RETINAL DETACHMENT: Primary | ICD-10-CM

## 2024-05-28 PROCEDURE — 99213 OFFICE O/P EST LOW 20 MIN: CPT | Performed by: INTERNAL MEDICINE

## 2024-05-28 NOTE — PROGRESS NOTES
[facility]  INTERNAL MEDICINE OFFICE VISIT     PATIENT INFORMATION     Jolynn Guillory   63 y.o. female   MRN: [id number]    ASSESSMENT/PLAN     Diagnoses and all orders for this visit:    Left retinal detachment  Patient with recent surgery for retinal detachment complaining today of redness of her left eye. She appears to have injected conjunctiva of her left eye. Fortunately patient does not have any alarm symptoms (see below). However, given that her symptoms started after she stopped taking/ran out of her postop eye drops, I suggested that the patient call her Ophthalmologist today to get an appointment as soon as possible. I also called her Ophthalmologist to make them aware of this.    Major depressive disorder in partial remission, unspecified whether recurrent (HCC)  Continue current regimen.  Follow up with her Psychiatrist as scheduled.      Schedule a follow-up appointment in 3 months.    HEALTH MAINTENANCE     Immunization History   Administered Date(s) Administered    COVID-19 PFIZER VACCINE 0.3 ML IM 04/01/2021, 04/22/2021    INFLUENZA 11/02/2017, 09/15/2018    Influenza Quadrivalent Preservative Free 3 years and older IM 01/21/2016    Influenza, injectable, quadrivalent, preservative free 0.5 mL 09/15/2018    Influenza, recombinant, quadrivalent,injectable, preservative free 09/30/2020    Tdap 08/30/2016     CHIEF COMPLAINT     Chief Complaint   Patient presents with    Follow-up    Depression      HISTORY OF PRESENT ILLNESS     62yo F presenting for follow up of depression. At last visit, she was seen for fatigue for 2 days after recent initiation of Wellbutrin in addition to her other psychiatric regimen including Lexapro, Abilify, Ambien, and Xanax. Other than mild slowed speech, her exam was normal. Etiology possibly polypharmacy vs hypothyroidism vs other metabolic abnormalities. She was instructed to stop Wellbutrin and discuss with her Psychiatrist about dose  adjustment/alternative therapies for her depression/anxiety. Additionally, TSH, CBC, and CMP were all normal. Since her last visit, she did stop taking the Wellbutrin. She saw her Psychiatrist 2 weeks ago who did restart the Wellbutrin. She has been taking it everyday since then and is starting to feel some improvement in her depression and anxiety.    The only thing she wanted to discuss was some redness in her left eye for about the last 1 week. She did have surgery for retinal detachment done at [facility] in April. She was last seen on 4/30/2024 during which she was supposed to return in 2 weeks which she did not do. She does not have any pain, blurry vision, seeing spots in her vision, pain with movement of her eye, fevers or chills, headaches, excess lacrimation, awakening with her eye stuck shut, feeling like her eye is dry/itchy/irritated. I called her Ophthalmologist's office and made them aware of this and that the patient should be seen as soon as possible given her recent retinal detachment surgery.    REVIEW OF SYSTEMS     Review of Systems - 12 point review of systems completed and negative unless stated above.    OBJECTIVE     Vitals:    05/28/24 1416   BP: 136/81   BP Location: Right arm   Patient Position: Sitting   Cuff Size: Large   Pulse: 91   Temp: 98.1 °F (36.7 °C)   TempSrc: Temporal   SpO2: 99%   Weight: 87.7 kg (193 lb 6.4 oz)     Physical Exam  Vitals reviewed.   Constitutional:       General: She is not in acute distress.     Appearance: Normal appearance. She is well-developed.   HENT:      Head: Normocephalic and atraumatic.   Eyes:      General: Lids are normal. Vision grossly intact.      Extraocular Movements: Extraocular movements intact.      Right eye: Normal extraocular motion.      Left eye: Normal extraocular motion.      Conjunctiva/sclera:      Left eye: Left conjunctiva is injected. No exudate.     Pupils:      Right eye: Pupil is not sluggish.      Left eye: Pupil is  sluggish.      Comments: Fundoscopic exam attempted but unsuccessful due to lack of patient participation.   Musculoskeletal:      Cervical back: Neck supple.   Skin:     General: Skin is warm and dry.   Neurological:      Mental Status: She is alert and oriented to person, place, and time.   Psychiatric:         Mood and Affect: Mood normal.         Thought Content: Thought content normal.       CURRENT MEDICATIONS     Current Outpatient Medications:     acetaminophen (TYLENOL) 325 mg tablet, Take 2 tablets (650 mg total) by mouth every 6 (six) hours as needed for mild pain or fever, Disp: 30 tablet, Rfl: 0    ALPRAZolam (XANAX) 0.5 mg tablet, Take 1 mg by mouth 2 (two) times a day as needed for anxiety, Disp: , Rfl:     amcinonide (CYCLOCORT) 0.1 % lotion, Apply topically 2 (two) times a day Please use 2 times a day, preferably after you have showered and before bed, and spread evenly across the scalp. Please use this in combination with your medicated shampoos. This steroid lotion should be used for 3 weeks every day., Disp: 60 mL, Rfl: 0    ARIPiprazole (ABILIFY) 5 mg tablet, Take 5 mg by mouth daily, Disp: , Rfl:     benzonatate (TESSALON PERLES) 100 mg capsule, 2 po q 8 hr prn cough, Disp: 30 capsule, Rfl: 0    Blood Pressure KIT, by Does not apply route daily, Disp: 1 each, Rfl: 0    buPROPion (WELLBUTRIN) 75 mg tablet, Take 300 mg by mouth in the morning, Disp: , Rfl:     clobetasol (TEMOVATE) 0.05 % external solution, APPLY TOPICALLY 2 (TWO) TIMES A DAY TO SCALP FOR UP TO 2 WEEKS AT A TIME. PLEASE ALLOW ONE WEEK BREAK IN BETWEEN USES., Disp: 50 mL, Rfl: 1    Diclofenac Sodium (VOLTAREN) 1 %, Apply 4 g topically 4 (four) times a day, Disp: 480 g, Rfl: 0    escitalopram (LEXAPRO) 10 mg tablet, Take 10 mg by mouth daily, Disp: , Rfl:     famotidine (PEPCID) 40 MG tablet, Take 1 tablet (40 mg total) by mouth 2 (two) times a day, Disp: 60 tablet, Rfl: 2    ketoconazole (NIZORAL) 2 % shampoo, Apply 1 Application  topically 2 (two) times a week, Disp: 120 mL, Rfl: 11    oxymetazoline (AFRIN) 0.05 % nasal spray, 2 sprays by Each Nare route 2 (two) times a day for 3 days, Disp: 1.2 mL, Rfl: 0    zolpidem (AMBIEN) 10 mg tablet, Take 10 mg by mouth daily at bedtime as needed, Disp: , Rfl:     PAST MEDICAL & SURGICAL HISTORY     Past Medical History:   Diagnosis Date    Anemia     Anxiety disorder     Bipolar disorder (HCC)     Psychiatric disorder      Past Surgical History:   Procedure Laterality Date     SECTION      , ,     DENTAL SURGERY      TUBAL LIGATION       SOCIAL & FAMILY HISTORY     Social History     Socioeconomic History    Marital status: /Civil Union     Spouse name: Not on file    Number of children: Not on file    Years of education: Not on file    Highest education level: Not on file   Occupational History    Not on file   Tobacco Use    Smoking status: Never    Smokeless tobacco: Never   Vaping Use    Vaping status: Never Used   Substance and Sexual Activity    Alcohol use: No    Drug use: No    Sexual activity: Not Currently     Partners: Male     Birth control/protection: Condom Female   Other Topics Concern    Not on file   Social History Narrative    Not on file     Social Determinants of Health     Financial Resource Strain: Low Risk  (3/25/2024)    Overall Financial Resource Strain (CARDIA)     Difficulty of Paying Living Expenses: Not very hard   Food Insecurity: No Food Insecurity (3/25/2024)    Hunger Vital Sign     Worried About Running Out of Food in the Last Year: Never true     Ran Out of Food in the Last Year: Never true   Transportation Needs: No Transportation Needs (3/25/2024)    PRAPARE - Transportation     Lack of Transportation (Medical): No     Lack of Transportation (Non-Medical): No   Physical Activity: Insufficiently Active (2021)    Exercise Vital Sign     Days of Exercise per Week: 2 days     Minutes of Exercise per Session: 10 min   Stress: No Stress  Concern Present (7/8/2021)    [place] [employer] - Occupational Stress Questionnaire     Feeling of Stress : Only a little   Social Connections: Moderately Integrated (7/8/2021)    Social Connection and Isolation Panel [NHANES]     Frequency of Communication with Friends and Family: Twice a week     Frequency of Social Gatherings with Friends and Family: More than three times a week     Attends Mosque Services: More than 4 times per year     Active Member of Clubs or Organizations: Yes     Attends Club or Organization Meetings: 1 to 4 times per year     Marital Status:    Intimate Partner Violence: Not At Risk (7/8/2021)    Humiliation, Afraid, Rape, and Kick questionnaire     Fear of Current or Ex-Partner: No     Emotionally Abused: No     Physically Abused: No     Sexually Abused: No   Housing Stability: Low Risk  (3/25/2024)    Housing Stability Vital Sign     Unable to Pay for Housing in the Last Year: No     Number of Places Lived in the Last Year: 1     Unstable Housing in the Last Year: No     Social History     Substance and Sexual Activity   Alcohol Use No       Social History     Substance and Sexual Activity   Drug Use No     Social History     Tobacco Use   Smoking Status Never   Smokeless Tobacco Never     Family History   Problem Relation Age of Onset    Depression Mother     Diabetes Mother     Arthritis Father     Bipolar disorder Son     Anxiety disorder Son     Depression Son     Anxiety disorder Daughter     Bipolar disorder Daughter     Depression Daughter                ==  Rc Beck DO  PGY-3  [facility]    [address], [address]  [place] [address]  Office: [phone]  Fax: [phone]

## 2024-05-28 NOTE — PATIENT INSTRUCTIONS
Continue following with your psychiatrist.    Call your eye doctor to make an appointment as soon as possible. Their number is [phone].

## 2024-08-13 ENCOUNTER — VBI (OUTPATIENT)
Dept: ADMINISTRATIVE | Facility: OTHER | Age: 64
End: 2024-08-13

## 2024-08-13 NOTE — TELEPHONE ENCOUNTER
08/13/24 6:57 AM     Chart reviewed for CRC: Colonoscopy ; nothing is submitted to the patient's insurance at this time.     Tasha Barahona   [facility]

## 2024-08-27 ENCOUNTER — TELEPHONE (OUTPATIENT)
Dept: INTERNAL MEDICINE CLINIC | Facility: CLINIC | Age: 64
End: 2024-08-27

## 2024-08-29 ENCOUNTER — OFFICE VISIT (OUTPATIENT)
Dept: INTERNAL MEDICINE CLINIC | Facility: CLINIC | Age: 64
End: 2024-08-29

## 2024-08-29 VITALS
HEART RATE: 103 BPM | BODY MASS INDEX: 42.82 KG/M2 | TEMPERATURE: 98.2 F | DIASTOLIC BLOOD PRESSURE: 83 MMHG | SYSTOLIC BLOOD PRESSURE: 133 MMHG | WEIGHT: 191 LBS

## 2024-08-29 DIAGNOSIS — Z12.4 CERVICAL CANCER SCREENING: ICD-10-CM

## 2024-08-29 DIAGNOSIS — Z13.9 SCREENING DUE: ICD-10-CM

## 2024-08-29 DIAGNOSIS — R73.03 PREDIABETES: Primary | ICD-10-CM

## 2024-08-29 DIAGNOSIS — Z59.9 FINANCIAL DIFFICULTIES: ICD-10-CM

## 2024-08-29 DIAGNOSIS — Z12.31 ENCOUNTER FOR SCREENING MAMMOGRAM FOR BREAST CANCER: ICD-10-CM

## 2024-08-29 DIAGNOSIS — F33.0 MILD EPISODE OF RECURRENT MAJOR DEPRESSIVE DISORDER (HCC): ICD-10-CM

## 2024-08-29 DIAGNOSIS — R03.0 ELEVATED BP WITHOUT DIAGNOSIS OF HYPERTENSION: ICD-10-CM

## 2024-08-29 PROCEDURE — 99214 OFFICE O/P EST MOD 30 MIN: CPT | Performed by: STUDENT IN AN ORGANIZED HEALTH CARE EDUCATION/TRAINING PROGRAM

## 2024-08-29 SDOH — ECONOMIC STABILITY - INCOME SECURITY: PROBLEM RELATED TO HOUSING AND ECONOMIC CIRCUMSTANCES, UNSPECIFIED: Z59.9

## 2024-08-29 NOTE — ASSESSMENT & PLAN NOTE
Counseled on diet and lifestyle reccommendations  Discussed possible need for medication if worsening  Declining DM education and nutrition referral at this time

## 2024-08-29 NOTE — ASSESSMENT & PLAN NOTE
Improved  R/o hypothyroidism, vit D deficiency, bipolar disorder    PHQ-9 Depression Screening    Little interest or pleasure in doing things: 2 - more than half the days  Feeling down, depressed, or hopeless: 3 - nearly every day  Trouble falling or staying asleep, or sleeping too much: 0 - not at all  Feeling tired or having little energy: 2 - more than half the days  Poor appetite or overeatin - not at all  Feeling bad about yourself - or that you are a failure or have let yourself or your family down: 0 - not at all  Trouble concentrating on things, such as reading the newspaper or watching television: 0 - not at all  Moving or speaking so slowly that other people could have noticed. Or the opposite - being so fidgety or restless that you have been moving around a lot more than usual: 2 - more than half the days  Thoughts that you would be better off dead, or of hurting yourself in some way: 0 - not at all  PHQ-9 Score: 9  Score Interpretation: Mild depression        TSH ordered  Vit D ordered     Social support: spouse, daughter, granddaughter  Lives with spouse, feels safe  Denies active or passive SI/HI, no access to weapons at home  no irritability, + psychomotor slowing, + feelings of guilt, + low energy levels, + issues concentrating, + anhedonia  no manic symptoms  Current regimen: welbutrin 300 daily, xanax 0.5 mg BID ambien 10mg qhs               Spoke with patient on serotonin syndrome. ED precautions given              Couseled on talking to psych dr about med rec and possibly lowering dosing to combat weight gain and knee pain  Encouraged healthy diet and lifestyle modifications  Encouraged talk-therapy, journaling, and midnfullness, patient does have psychiatrist  Encouraged 15 minutes unfiltered sunlight daily

## 2024-08-29 NOTE — PROGRESS NOTES
Ambulatory Visit  Name: Jolynn Guillory      : 1960      MRN: [id number]  Encounter Provider: Katerina Cooney DO  Encounter Date: 2024   Encounter department: [facility]    Assessment & Plan   1. Prediabetes  Assessment & Plan:  Counseled on diet and lifestyle reccommendations  Discussed possible need for medication if worsening  Declining DM education and nutrition referral at this time  Orders:  -     Hemoglobin A1C; Future  2. BMI 40.0-44.9, adult (HCC)  Assessment & Plan:  - pt with BMI of Body mass index is 42.82 kg/m².  - pt counseled on risks associated with obesity and it's contribution to other health issues  - decrease psych meds when possible to avoid weight gain  - advise portion control, healthy food choices, drinking water instead of juice/soda  - advise beginning light exercise program (i.e. Walking 30min 4-5x/wk) or consider gym membership  - advise keeping food diary to help identify problem foods/times/stressors  -consider nutrition referral  - pt advised that weight loss of ~ 1lb/wk is a good goal and not to become frustrated if loss is slower  -as BMI >35, does qualify for bariatric surgery at this time, though declining referral, revisit in future if agreeable  3. Screening due  -     Comprehensive metabolic panel; Future  -     CBC and differential; Future  -     TSH + Free T4; Future  -     Vitamin D 25 hydroxy; Future  -     Mammo screening bilateral w 3d and cad; Future  -     DXA bone density spine hip and pelvis; Future; Expected date: 2024  4. Financial difficulties  -     Ambulatory Referral to Financial Counseling Program; Future  5. Cervical cancer screening  -     Ambulatory Referral to Obstetrics / Gynecology; Future  6. Encounter for screening mammogram for breast cancer  -     Mammo screening bilateral w 3d and cad; Future  7. Mild episode of recurrent major depressive disorder (HCC)  Assessment & Plan:  Improved  R/o  hypothyroidism, vit D deficiency, bipolar disorder    PHQ-9 Depression Screening    Little interest or pleasure in doing things: 2 - more than half the days  Feeling down, depressed, or hopeless: 3 - nearly every day  Trouble falling or staying asleep, or sleeping too much: 0 - not at all  Feeling tired or having little energy: 2 - more than half the days  Poor appetite or overeatin - not at all  Feeling bad about yourself - or that you are a failure or have let yourself or your family down: 0 - not at all  Trouble concentrating on things, such as reading the newspaper or watching television: 0 - not at all  Moving or speaking so slowly that other people could have noticed. Or the opposite - being so fidgety or restless that you have been moving around a lot more than usual: 2 - more than half the days  Thoughts that you would be better off dead, or of hurting yourself in some way: 0 - not at all  PHQ-9 Score: 9  Score Interpretation: Mild depression        TSH ordered  Vit D ordered     Social support: spouse, daughter, granddaughter  Lives with spouse, feels safe  Denies active or passive SI/HI, no access to weapons at home  no irritability, + psychomotor slowing, + feelings of guilt, + low energy levels, + issues concentrating, + anhedonia  no manic symptoms  Current regimen: welbutrin 300 daily, xanax 0.5 mg BID ambien 10mg qhs               Spoke with patient on serotonin syndrome. ED precautions given              Couseled on talking to psych dr about med rec and possibly lowering dosing to combat weight gain and knee pain  Encouraged healthy diet and lifestyle modifications  Encouraged talk-therapy, journaling, and midnfullness, patient does have psychiatrist  Encouraged 15 minutes unfiltered sunlight daily  8. Elevated BP without diagnosis of hypertension  Assessment & Plan:  Recheck next visit  May consider screening JAYNA and likely 2/2 body habitus  Please see obesity for detailed lifestyle  modifications    BMI Counseling: Body mass index is 42.82 kg/m². The BMI is above normal. Nutrition recommendations include decreasing portion sizes, encouraging healthy choices of fruits and vegetables, consuming healthier snacks, limiting drinks that contain sugar, moderation in carbohydrate intake, increasing intake of lean protein and reducing intake of cholesterol. Exercise recommendations include exercising 3-5 times per week. No pharmacotherapy was ordered. Patient referred to PCP. Rationale for BMI follow-up plan is due to patient being overweight or obese.     Depression Screening and Follow-up Plan: Patient's depression screening was positive with a PHQ-9 score of 9. Patient assessed for underlying major depression. Brief counseling provided and recommend additional follow-up/re-evaluation next office visit. Continue regular follow-up with their mental health provider who is managing their mental health condition(s). Patient with underlying depression and was advised to continue current medications as prescribed.         History of Present Illness     Patient is a 63 year old female with PMHx of prediabetes, psoriasis, obesity, GERD, depression, L retinal detachment s/p sx, who presents today as follow up for depression.  in in the room and confirms HPI. Pt currently seeing psychiatrist and feel comfortable/able to relate,      Review of Systems   Constitutional:  Negative for diaphoresis and fever.   Eyes:  Negative for visual disturbance.   Respiratory:  Negative for shortness of breath.    Cardiovascular:  Negative for chest pain and palpitations.   Gastrointestinal:  Negative for abdominal pain, constipation, diarrhea, nausea and vomiting.   Genitourinary:  Negative for difficulty urinating.   Neurological:  Negative for dizziness, syncope and light-headedness.   Psychiatric/Behavioral:  Positive for dysphoric mood. Negative for sleep disturbance. The patient is nervous/anxious.      Past  Medical History:   Diagnosis Date    Anemia     Anxiety disorder     Bipolar disorder (HCC)     BMI 34.0-34.9,adult 2023    Psychiatric disorder      Past Surgical History:   Procedure Laterality Date     SECTION      , ,     DENTAL SURGERY      TUBAL LIGATION       Family History   Problem Relation Age of Onset    Depression Mother     Diabetes Mother     Arthritis Father     Bipolar disorder Son     Anxiety disorder Son     Depression Son     Anxiety disorder Daughter     Bipolar disorder Daughter     Depression Daughter      Social History     Tobacco Use    Smoking status: Never    Smokeless tobacco: Never   Vaping Use    Vaping status: Never Used   Substance and Sexual Activity    Alcohol use: No    Drug use: No    Sexual activity: Not Currently     Partners: Male     Birth control/protection: Condom Female     Current Outpatient Medications on File Prior to Visit   Medication Sig    acetaminophen (TYLENOL) 325 mg tablet Take 2 tablets (650 mg total) by mouth every 6 (six) hours as needed for mild pain or fever    ALPRAZolam (XANAX) 0.5 mg tablet Take 1 mg by mouth 2 (two) times a day as needed for anxiety    amcinonide (CYCLOCORT) 0.1 % lotion Apply topically 2 (two) times a day Please use 2 times a day, preferably after you have showered and before bed, and spread evenly across the scalp. Please use this in combination with your medicated shampoos. This steroid lotion should be used for 3 weeks every day.    ARIPiprazole (ABILIFY) 5 mg tablet Take 15 mg by mouth daily    Blood Pressure KIT by Does not apply route daily    buPROPion (WELLBUTRIN) 75 mg tablet Take 300 mg by mouth in the morning    clobetasol (TEMOVATE) 0.05 % external solution APPLY TOPICALLY 2 (TWO) TIMES A DAY TO SCALP FOR UP TO 2 WEEKS AT A TIME. PLEASE ALLOW ONE WEEK BREAK IN BETWEEN USES.    Diclofenac Sodium (VOLTAREN) 1 % Apply 4 g topically 4 (four) times a day    ketoconazole (NIZORAL) 2 % shampoo Apply 1  Application topically 2 (two) times a week    zolpidem (AMBIEN) 10 mg tablet Take 10 mg by mouth daily at bedtime as needed    [DISCONTINUED] benzonatate (TESSALON PERLES) 100 mg capsule 2 po q 8 hr prn cough    [DISCONTINUED] escitalopram (LEXAPRO) 10 mg tablet Take 10 mg by mouth daily    [DISCONTINUED] famotidine (PEPCID) 40 MG tablet Take 1 tablet (40 mg total) by mouth 2 (two) times a day    [DISCONTINUED] oxymetazoline (AFRIN) 0.05 % nasal spray 2 sprays by Each Nare route 2 (two) times a day for 3 days     No Known Allergies  Immunization History   Administered Date(s) Administered    COVID-19 PFIZER VACCINE 0.3 ML IM 04/01/2021, 04/22/2021    INFLUENZA 11/02/2017, 09/15/2018    Influenza Quadrivalent Preservative Free 3 years and older IM 01/21/2016    Influenza, injectable, quadrivalent, preservative free 0.5 mL 09/15/2018    Influenza, recombinant, quadrivalent,injectable, preservative free 09/30/2020    Tdap 08/30/2016     Objective     /83 (BP Location: Left arm, Patient Position: Sitting, Cuff Size: Large)   Pulse 103   Temp 98.2 °F (36.8 °C) (Temporal)   Wt 86.6 kg (191 lb)   LMP  (LMP Unknown)   BMI 42.82 kg/m²     Physical Exam  Constitutional:       General: She is not in acute distress.     Appearance: She is well-developed. She is obese.   HENT:      Head: Normocephalic and atraumatic.      Right Ear: Tympanic membrane, ear canal and external ear normal. There is no impacted cerumen.      Left Ear: Tympanic membrane, ear canal and external ear normal.      Nose: Nose normal.      Mouth/Throat:      Mouth: Mucous membranes are moist.   Eyes:      General: No scleral icterus.     Conjunctiva/sclera: Conjunctivae normal.      Pupils: Pupils are equal, round, and reactive to light.   Neck:      Thyroid: No thyromegaly.   Cardiovascular:      Rate and Rhythm: Normal rate and regular rhythm.      Heart sounds: Normal heart sounds. No murmur heard.     No friction rub. No gallop.   Pulmonary:       Effort: Pulmonary effort is normal. No respiratory distress.      Breath sounds: Normal breath sounds. No stridor. No wheezing or rales.   Abdominal:      General: Bowel sounds are normal. There is no distension.      Palpations: Abdomen is soft. There is no mass.      Tenderness: There is no abdominal tenderness. There is no guarding or rebound.   Musculoskeletal:         General: No deformity.      Cervical back: Neck supple.      Right lower leg: No edema.      Left lower leg: No edema.   Skin:     General: Skin is warm.      Capillary Refill: Capillary refill takes less than 2 seconds.      Findings: No erythema or rash.   Neurological:      Mental Status: She is alert and oriented to person, place, and time.   Psychiatric:         Mood and Affect: Mood normal.         Behavior: Behavior normal.         Thought Content: Thought content normal.         Judgment: Judgment normal.      Comments: Mildly flat affect, mild psychomotor slowing/responses, not tangential       Administrative Statements   I have spent a total time of 40 minutes in caring for this patient on the day of the visit/encounter including Diagnostic results, Risks and benefits of tx options, Instructions for management, Patient and family education, Importance of tx compliance, Risk factor reductions, Impressions, Counseling / Coordination of care, Documenting in the medical record, Reviewing / ordering tests, medicine, procedures  , Obtaining or reviewing history  , and Communicating with other healthcare professionals .

## 2024-08-29 NOTE — ASSESSMENT & PLAN NOTE
Recheck next visit  May consider screening JAYNA and likely 2/2 body habitus  Please see obesity for detailed lifestyle modifications

## 2024-08-29 NOTE — ASSESSMENT & PLAN NOTE
- pt with BMI of Body mass index is 42.82 kg/m².  - pt counseled on risks associated with obesity and it's contribution to other health issues  - decrease psych meds when possible to avoid weight gain  - advise portion control, healthy food choices, drinking water instead of juice/soda  - advise beginning light exercise program (i.e. Walking 30min 4-5x/wk) or consider gym membership  - advise keeping food diary to help identify problem foods/times/stressors  -consider nutrition referral  - pt advised that weight loss of ~ 1lb/wk is a good goal and not to become frustrated if loss is slower  -as BMI >35, does qualify for bariatric surgery at this time, though declining referral, revisit in future if agreeable

## 2024-08-29 NOTE — PROGRESS NOTES
BMI Counseling: There is no height or weight on file to calculate BMI. The BMI is above normal. Nutrition recommendations include decreasing portion sizes, encouraging healthy choices of fruits and vegetables, consuming healthier snacks, limiting drinks that contain sugar, moderation in carbohydrate intake, increasing intake of lean protein and reducing intake of cholesterol. Exercise recommendations include exercising 3-5 times per week. No pharmacotherapy was ordered. Rationale for BMI follow-up plan is due to patient being overweight or obese.

## 2024-08-30 ENCOUNTER — APPOINTMENT (OUTPATIENT)
Dept: LAB | Facility: CLINIC | Age: 64
End: 2024-08-30
Payer: COMMERCIAL

## 2024-08-30 DIAGNOSIS — R73.03 PREDIABETES: ICD-10-CM

## 2024-08-30 DIAGNOSIS — Z13.9 SCREENING DUE: ICD-10-CM

## 2024-08-30 LAB
25(OH)D3 SERPL-MCNC: 27.4 NG/ML (ref 30–100)
ALBUMIN SERPL BCG-MCNC: 4.3 G/DL (ref 3.5–5)
ALP SERPL-CCNC: 50 U/L (ref 34–104)
ALT SERPL W P-5'-P-CCNC: 27 U/L (ref 7–52)
ANION GAP SERPL CALCULATED.3IONS-SCNC: 10 MMOL/L (ref 4–13)
AST SERPL W P-5'-P-CCNC: 17 U/L (ref 13–39)
BASOPHILS # BLD AUTO: 0.04 THOUSANDS/ÂΜL (ref 0–0.1)
BASOPHILS NFR BLD AUTO: 1 % (ref 0–1)
BILIRUB SERPL-MCNC: 0.5 MG/DL (ref 0.2–1)
BUN SERPL-MCNC: 8 MG/DL (ref 5–25)
CALCIUM SERPL-MCNC: 9.4 MG/DL (ref 8.4–10.2)
CHLORIDE SERPL-SCNC: 103 MMOL/L (ref 96–108)
CO2 SERPL-SCNC: 26 MMOL/L (ref 21–32)
CREAT SERPL-MCNC: 0.65 MG/DL (ref 0.6–1.3)
EOSINOPHIL # BLD AUTO: 0.07 THOUSAND/ÂΜL (ref 0–0.61)
EOSINOPHIL NFR BLD AUTO: 1 % (ref 0–6)
ERYTHROCYTE [DISTWIDTH] IN BLOOD BY AUTOMATED COUNT: 12.4 % (ref 11.6–15.1)
EST. AVERAGE GLUCOSE BLD GHB EST-MCNC: 120 MG/DL
GFR SERPL CREATININE-BSD FRML MDRD: 96 ML/MIN/1.73SQ M
GLUCOSE P FAST SERPL-MCNC: 100 MG/DL (ref 65–99)
HBA1C MFR BLD: 5.8 %
HCT VFR BLD AUTO: 39.3 % (ref 34.8–46.1)
HGB BLD-MCNC: 12.7 G/DL (ref 11.5–15.4)
IMM GRANULOCYTES # BLD AUTO: 0.02 THOUSAND/UL (ref 0–0.2)
IMM GRANULOCYTES NFR BLD AUTO: 0 % (ref 0–2)
LYMPHOCYTES # BLD AUTO: 1.04 THOUSANDS/ÂΜL (ref 0.6–4.47)
LYMPHOCYTES NFR BLD AUTO: 18 % (ref 14–44)
MCH RBC QN AUTO: 28.9 PG (ref 26.8–34.3)
MCHC RBC AUTO-ENTMCNC: 32.3 G/DL (ref 31.4–37.4)
MCV RBC AUTO: 89 FL (ref 82–98)
MONOCYTES # BLD AUTO: 0.46 THOUSAND/ÂΜL (ref 0.17–1.22)
MONOCYTES NFR BLD AUTO: 8 % (ref 4–12)
NEUTROPHILS # BLD AUTO: 4.18 THOUSANDS/ÂΜL (ref 1.85–7.62)
NEUTS SEG NFR BLD AUTO: 72 % (ref 43–75)
NRBC BLD AUTO-RTO: 0 /100 WBCS
PLATELET # BLD AUTO: 200 THOUSANDS/UL (ref 149–390)
PMV BLD AUTO: 11.5 FL (ref 8.9–12.7)
POTASSIUM SERPL-SCNC: 4.3 MMOL/L (ref 3.5–5.3)
PROT SERPL-MCNC: 7.3 G/DL (ref 6.4–8.4)
RBC # BLD AUTO: 4.4 MILLION/UL (ref 3.81–5.12)
SODIUM SERPL-SCNC: 139 MMOL/L (ref 135–147)
T4 FREE SERPL-MCNC: 0.67 NG/DL (ref 0.61–1.12)
TSH SERPL DL<=0.05 MIU/L-ACNC: 3.72 UIU/ML (ref 0.45–4.5)
WBC # BLD AUTO: 5.81 THOUSAND/UL (ref 4.31–10.16)

## 2024-08-30 PROCEDURE — 85025 COMPLETE CBC W/AUTO DIFF WBC: CPT

## 2024-08-30 PROCEDURE — 80053 COMPREHEN METABOLIC PANEL: CPT

## 2024-08-30 PROCEDURE — 84439 ASSAY OF FREE THYROXINE: CPT

## 2024-08-30 PROCEDURE — 83036 HEMOGLOBIN GLYCOSYLATED A1C: CPT

## 2024-08-30 PROCEDURE — 82306 VITAMIN D 25 HYDROXY: CPT

## 2024-08-30 PROCEDURE — 84443 ASSAY THYROID STIM HORMONE: CPT

## 2024-08-30 PROCEDURE — 36415 COLL VENOUS BLD VENIPUNCTURE: CPT

## 2024-09-04 ENCOUNTER — HOSPITAL ENCOUNTER (INPATIENT)
Facility: HOSPITAL | Age: 64
LOS: 8 days | Discharge: HOME | DRG: 753 | End: 2024-09-12
Attending: PSYCHIATRY & NEUROLOGY | Admitting: PSYCHIATRY & NEUROLOGY
Payer: COMMERCIAL

## 2024-09-04 ENCOUNTER — HOSPITAL ENCOUNTER (EMERGENCY)
Facility: HOSPITAL | Age: 64
End: 2024-09-04
Attending: EMERGENCY MEDICINE
Payer: COMMERCIAL

## 2024-09-04 VITALS
RESPIRATION RATE: 16 BRPM | TEMPERATURE: 98.3 F | OXYGEN SATURATION: 97 % | SYSTOLIC BLOOD PRESSURE: 130 MMHG | HEART RATE: 76 BPM | DIASTOLIC BLOOD PRESSURE: 61 MMHG

## 2024-09-04 DIAGNOSIS — E55.9 VITAMIN D DEFICIENCY: ICD-10-CM

## 2024-09-04 DIAGNOSIS — F51.01 PRIMARY INSOMNIA: ICD-10-CM

## 2024-09-04 DIAGNOSIS — E53.8 VITAMIN B12 DEFICIENCY: ICD-10-CM

## 2024-09-04 DIAGNOSIS — F32.A DEPRESSION: Primary | ICD-10-CM

## 2024-09-04 DIAGNOSIS — F32.9 MAJOR DEPRESSIVE DISORDER: ICD-10-CM

## 2024-09-04 DIAGNOSIS — J30.2 SEASONAL ALLERGIES: ICD-10-CM

## 2024-09-04 DIAGNOSIS — F32.A DEPRESSION: ICD-10-CM

## 2024-09-04 DIAGNOSIS — F31.4 BIPOLAR DISORDER WITH SEVERE DEPRESSION (HCC): Primary | ICD-10-CM

## 2024-09-04 LAB
ALBUMIN SERPL BCG-MCNC: 4.9 G/DL (ref 3.5–5)
ALP SERPL-CCNC: 56 U/L (ref 34–104)
ALT SERPL W P-5'-P-CCNC: 27 U/L (ref 7–52)
AMPHETAMINES SERPL QL SCN: NEGATIVE
ANION GAP SERPL CALCULATED.3IONS-SCNC: 10 MMOL/L (ref 4–13)
AST SERPL W P-5'-P-CCNC: 17 U/L (ref 13–39)
ATRIAL RATE: 113 BPM
BACTERIA UR QL AUTO: ABNORMAL /HPF
BARBITURATES UR QL: NEGATIVE
BASOPHILS # BLD AUTO: 0.04 THOUSANDS/ÂΜL (ref 0–0.1)
BASOPHILS NFR BLD AUTO: 1 % (ref 0–1)
BENZODIAZ UR QL: POSITIVE
BILIRUB SERPL-MCNC: 0.42 MG/DL (ref 0.2–1)
BILIRUB UR QL STRIP: NEGATIVE
BUN SERPL-MCNC: 11 MG/DL (ref 5–25)
CALCIUM SERPL-MCNC: 9.8 MG/DL (ref 8.4–10.2)
CHLORIDE SERPL-SCNC: 102 MMOL/L (ref 96–108)
CLARITY UR: CLEAR
CO2 SERPL-SCNC: 26 MMOL/L (ref 21–32)
COCAINE UR QL: NEGATIVE
COLOR UR: ABNORMAL
CREAT SERPL-MCNC: 0.64 MG/DL (ref 0.6–1.3)
EOSINOPHIL # BLD AUTO: 0.04 THOUSAND/ÂΜL (ref 0–0.61)
EOSINOPHIL NFR BLD AUTO: 1 % (ref 0–6)
ERYTHROCYTE [DISTWIDTH] IN BLOOD BY AUTOMATED COUNT: 12.3 % (ref 11.6–15.1)
ETHANOL EXG-MCNC: 0 MG/DL
FENTANYL UR QL SCN: NEGATIVE
GFR SERPL CREATININE-BSD FRML MDRD: 96 ML/MIN/1.73SQ M
GLUCOSE SERPL-MCNC: 93 MG/DL (ref 65–140)
GLUCOSE UR STRIP-MCNC: NEGATIVE MG/DL
HCT VFR BLD AUTO: 43.7 % (ref 34.8–46.1)
HGB BLD-MCNC: 14.4 G/DL (ref 11.5–15.4)
HGB UR QL STRIP.AUTO: NEGATIVE
HYDROCODONE UR QL SCN: NEGATIVE
IMM GRANULOCYTES # BLD AUTO: 0.02 THOUSAND/UL (ref 0–0.2)
IMM GRANULOCYTES NFR BLD AUTO: 0 % (ref 0–2)
KETONES UR STRIP-MCNC: NEGATIVE MG/DL
LEUKOCYTE ESTERASE UR QL STRIP: ABNORMAL
LYMPHOCYTES # BLD AUTO: 1.04 THOUSANDS/ÂΜL (ref 0.6–4.47)
LYMPHOCYTES NFR BLD AUTO: 17 % (ref 14–44)
MCH RBC QN AUTO: 28.7 PG (ref 26.8–34.3)
MCHC RBC AUTO-ENTMCNC: 33 G/DL (ref 31.4–37.4)
MCV RBC AUTO: 87 FL (ref 82–98)
METHADONE UR QL: NEGATIVE
MONOCYTES # BLD AUTO: 0.52 THOUSAND/ÂΜL (ref 0.17–1.22)
MONOCYTES NFR BLD AUTO: 9 % (ref 4–12)
NEUTROPHILS # BLD AUTO: 4.33 THOUSANDS/ÂΜL (ref 1.85–7.62)
NEUTS SEG NFR BLD AUTO: 72 % (ref 43–75)
NITRITE UR QL STRIP: NEGATIVE
NON-SQ EPI CELLS URNS QL MICRO: ABNORMAL /HPF
NRBC BLD AUTO-RTO: 0 /100 WBCS
OPIATES UR QL SCN: NEGATIVE
OXYCODONE+OXYMORPHONE UR QL SCN: NEGATIVE
P AXIS: 39 DEGREES
PCP UR QL: NEGATIVE
PH UR STRIP.AUTO: 6 [PH]
PLATELET # BLD AUTO: 235 THOUSANDS/UL (ref 149–390)
PMV BLD AUTO: 11.2 FL (ref 8.9–12.7)
POTASSIUM SERPL-SCNC: 4.1 MMOL/L (ref 3.5–5.3)
PR INTERVAL: 158 MS
PROT SERPL-MCNC: 8.1 G/DL (ref 6.4–8.4)
PROT UR STRIP-MCNC: NEGATIVE MG/DL
QRS AXIS: -7 DEGREES
QRSD INTERVAL: 70 MS
QT INTERVAL: 332 MS
QTC INTERVAL: 455 MS
RBC # BLD AUTO: 5.01 MILLION/UL (ref 3.81–5.12)
RBC #/AREA URNS AUTO: ABNORMAL /HPF
SODIUM SERPL-SCNC: 138 MMOL/L (ref 135–147)
SP GR UR STRIP.AUTO: 1.01 (ref 1–1.03)
T WAVE AXIS: 68 DEGREES
THC UR QL: NEGATIVE
TSH SERPL DL<=0.05 MIU/L-ACNC: 4.77 UIU/ML (ref 0.45–4.5)
UROBILINOGEN UR STRIP-ACNC: <2 MG/DL
VENTRICULAR RATE: 113 BPM
WBC # BLD AUTO: 5.99 THOUSAND/UL (ref 4.31–10.16)
WBC #/AREA URNS AUTO: ABNORMAL /HPF

## 2024-09-04 PROCEDURE — 80307 DRUG TEST PRSMV CHEM ANLYZR: CPT | Performed by: EMERGENCY MEDICINE

## 2024-09-04 PROCEDURE — 80053 COMPREHEN METABOLIC PANEL: CPT | Performed by: EMERGENCY MEDICINE

## 2024-09-04 PROCEDURE — 99285 EMERGENCY DEPT VISIT HI MDM: CPT | Performed by: EMERGENCY MEDICINE

## 2024-09-04 PROCEDURE — 81001 URINALYSIS AUTO W/SCOPE: CPT | Performed by: EMERGENCY MEDICINE

## 2024-09-04 PROCEDURE — 82075 ASSAY OF BREATH ETHANOL: CPT | Performed by: EMERGENCY MEDICINE

## 2024-09-04 PROCEDURE — 36415 COLL VENOUS BLD VENIPUNCTURE: CPT | Performed by: EMERGENCY MEDICINE

## 2024-09-04 PROCEDURE — 85025 COMPLETE CBC W/AUTO DIFF WBC: CPT | Performed by: EMERGENCY MEDICINE

## 2024-09-04 PROCEDURE — 84443 ASSAY THYROID STIM HORMONE: CPT | Performed by: EMERGENCY MEDICINE

## 2024-09-04 PROCEDURE — 93010 ELECTROCARDIOGRAM REPORT: CPT | Performed by: INTERNAL MEDICINE

## 2024-09-04 PROCEDURE — 99284 EMERGENCY DEPT VISIT MOD MDM: CPT

## 2024-09-04 PROCEDURE — 93005 ELECTROCARDIOGRAM TRACING: CPT

## 2024-09-04 RX ORDER — LANOLIN ALCOHOL/MO/W.PET/CERES
3 CREAM (GRAM) TOPICAL
Status: DISCONTINUED | OUTPATIENT
Start: 2024-09-04 | End: 2024-09-12 | Stop reason: HOSPADM

## 2024-09-04 RX ORDER — ACETAMINOPHEN 325 MG/1
650 TABLET ORAL EVERY 4 HOURS PRN
Status: CANCELLED | OUTPATIENT
Start: 2024-09-04

## 2024-09-04 RX ORDER — TRAZODONE HYDROCHLORIDE 50 MG/1
50 TABLET, FILM COATED ORAL
Status: DISCONTINUED | OUTPATIENT
Start: 2024-09-04 | End: 2024-09-12 | Stop reason: HOSPADM

## 2024-09-04 RX ORDER — ACETAMINOPHEN 325 MG/1
975 TABLET ORAL EVERY 6 HOURS PRN
Status: DISCONTINUED | OUTPATIENT
Start: 2024-09-04 | End: 2024-09-12 | Stop reason: HOSPADM

## 2024-09-04 RX ORDER — ALPRAZOLAM 0.5 MG
0.5 TABLET ORAL ONCE
Status: COMPLETED | OUTPATIENT
Start: 2024-09-04 | End: 2024-09-04

## 2024-09-04 RX ORDER — MAGNESIUM HYDROXIDE/ALUMINUM HYDROXICE/SIMETHICONE 120; 1200; 1200 MG/30ML; MG/30ML; MG/30ML
30 SUSPENSION ORAL EVERY 4 HOURS PRN
Status: CANCELLED | OUTPATIENT
Start: 2024-09-04

## 2024-09-04 RX ORDER — TRAZODONE HYDROCHLORIDE 50 MG/1
50 TABLET, FILM COATED ORAL
Status: CANCELLED | OUTPATIENT
Start: 2024-09-04

## 2024-09-04 RX ORDER — HYDROXYZINE HYDROCHLORIDE 25 MG/1
50 TABLET, FILM COATED ORAL
Status: CANCELLED | OUTPATIENT
Start: 2024-09-04

## 2024-09-04 RX ORDER — HYDROXYZINE HYDROCHLORIDE 25 MG/1
25 TABLET, FILM COATED ORAL
Status: CANCELLED | OUTPATIENT
Start: 2024-09-04

## 2024-09-04 RX ORDER — ACETAMINOPHEN 325 MG/1
650 TABLET ORAL EVERY 4 HOURS PRN
Status: DISCONTINUED | OUTPATIENT
Start: 2024-09-04 | End: 2024-09-12 | Stop reason: HOSPADM

## 2024-09-04 RX ORDER — PROPRANOLOL HYDROCHLORIDE 10 MG/1
5 TABLET ORAL EVERY 8 HOURS PRN
Status: CANCELLED | OUTPATIENT
Start: 2024-09-04

## 2024-09-04 RX ORDER — RISPERIDONE 0.25 MG/1
0.5 TABLET ORAL
Status: CANCELLED | OUTPATIENT
Start: 2024-09-04

## 2024-09-04 RX ORDER — RISPERIDONE 0.25 MG/1
0.25 TABLET ORAL
Status: DISCONTINUED | OUTPATIENT
Start: 2024-09-04 | End: 2024-09-12 | Stop reason: HOSPADM

## 2024-09-04 RX ORDER — HYDROXYZINE HYDROCHLORIDE 25 MG/1
25 TABLET, FILM COATED ORAL
Status: DISCONTINUED | OUTPATIENT
Start: 2024-09-04 | End: 2024-09-05

## 2024-09-04 RX ORDER — MAGNESIUM HYDROXIDE/ALUMINUM HYDROXICE/SIMETHICONE 120; 1200; 1200 MG/30ML; MG/30ML; MG/30ML
30 SUSPENSION ORAL EVERY 4 HOURS PRN
Status: DISCONTINUED | OUTPATIENT
Start: 2024-09-04 | End: 2024-09-12 | Stop reason: HOSPADM

## 2024-09-04 RX ORDER — HYDROXYZINE HYDROCHLORIDE 50 MG/1
50 TABLET, FILM COATED ORAL
Status: DISCONTINUED | OUTPATIENT
Start: 2024-09-04 | End: 2024-09-05

## 2024-09-04 RX ORDER — LANOLIN ALCOHOL/MO/W.PET/CERES
3 CREAM (GRAM) TOPICAL
Status: CANCELLED | OUTPATIENT
Start: 2024-09-04

## 2024-09-04 RX ORDER — HALOPERIDOL 5 MG/ML
5 INJECTION INTRAMUSCULAR
Status: CANCELLED | OUTPATIENT
Start: 2024-09-04

## 2024-09-04 RX ORDER — RISPERIDONE 0.5 MG/1
0.5 TABLET ORAL
Status: DISCONTINUED | OUTPATIENT
Start: 2024-09-04 | End: 2024-09-12 | Stop reason: HOSPADM

## 2024-09-04 RX ORDER — METOPROLOL TARTRATE 50 MG
50 TABLET ORAL ONCE
Status: COMPLETED | OUTPATIENT
Start: 2024-09-04 | End: 2024-09-04

## 2024-09-04 RX ORDER — RISPERIDONE 0.25 MG/1
0.25 TABLET ORAL
Status: CANCELLED | OUTPATIENT
Start: 2024-09-04

## 2024-09-04 RX ORDER — LABETALOL 100 MG/1
100 TABLET, FILM COATED ORAL ONCE
Status: DISCONTINUED | OUTPATIENT
Start: 2024-09-04 | End: 2024-09-04

## 2024-09-04 RX ORDER — HALOPERIDOL 5 MG/ML
5 INJECTION INTRAMUSCULAR
Status: DISCONTINUED | OUTPATIENT
Start: 2024-09-04 | End: 2024-09-12 | Stop reason: HOSPADM

## 2024-09-04 RX ORDER — ZOLPIDEM TARTRATE 5 MG/1
10 TABLET ORAL
Status: DISCONTINUED | OUTPATIENT
Start: 2024-09-04 | End: 2024-09-04 | Stop reason: HOSPADM

## 2024-09-04 RX ORDER — ACETAMINOPHEN 325 MG/1
975 TABLET ORAL EVERY 6 HOURS PRN
Status: CANCELLED | OUTPATIENT
Start: 2024-09-04

## 2024-09-04 RX ORDER — RISPERIDONE 1 MG/1
1 TABLET ORAL
Status: CANCELLED | OUTPATIENT
Start: 2024-09-04

## 2024-09-04 RX ORDER — RISPERIDONE 1 MG/1
1 TABLET ORAL
Status: DISCONTINUED | OUTPATIENT
Start: 2024-09-04 | End: 2024-09-12 | Stop reason: HOSPADM

## 2024-09-04 RX ORDER — LORAZEPAM 0.5 MG/1
0.5 TABLET ORAL ONCE
Status: COMPLETED | OUTPATIENT
Start: 2024-09-04 | End: 2024-09-04

## 2024-09-04 RX ORDER — PROPRANOLOL HYDROCHLORIDE 10 MG/1
5 TABLET ORAL EVERY 8 HOURS PRN
Status: DISCONTINUED | OUTPATIENT
Start: 2024-09-04 | End: 2024-09-12 | Stop reason: HOSPADM

## 2024-09-04 RX ADMIN — ALPRAZOLAM 0.5 MG: 0.5 TABLET ORAL at 19:54

## 2024-09-04 RX ADMIN — LORAZEPAM 0.5 MG: 0.5 TABLET ORAL at 13:06

## 2024-09-04 RX ADMIN — METOPROLOL TARTRATE 50 MG: 50 TABLET, FILM COATED ORAL at 14:50

## 2024-09-04 NOTE — ED PROVIDER NOTES
History  Chief Complaint   Patient presents with    Psychiatric Evaluation     Pt states that she has been feeling more depressed lately and doesn't feel like medication is working anymore. In additon to depression, Pt has been trembling, low energy, fatigue and loss of interest. Denies SI/HI     HPI  63-year-old female presenting with request for psychiatric evaluation.  States that her depression medication has not been helping her depression.  Has been increasingly more depressed, fatigue.  Patient also endorses loss of interest.  Denies any suicidal ideation, homicidal ideation, hallucinations.  Patient states that he was admitted to the behavioral health unit years ago for a worsening depression.    Prior to Admission Medications   Prescriptions Last Dose Informant Patient Reported? Taking?   ALPRAZolam (XANAX) 0.5 mg tablet   Yes No   Sig: Take 1 mg by mouth 2 (two) times a day as needed for anxiety   ARIPiprazole (ABILIFY) 5 mg tablet   Yes No   Sig: Take 15 mg by mouth daily   Blood Pressure KIT   No No   Sig: by Does not apply route daily   Diclofenac Sodium (VOLTAREN) 1 %   No No   Sig: Apply 4 g topically 4 (four) times a day   acetaminophen (TYLENOL) 325 mg tablet   No No   Sig: Take 2 tablets (650 mg total) by mouth every 6 (six) hours as needed for mild pain or fever   amcinonide (CYCLOCORT) 0.1 % lotion   No No   Sig: Apply topically 2 (two) times a day Please use 2 times a day, preferably after you have showered and before bed, and spread evenly across the scalp. Please use this in combination with your medicated shampoos. This steroid lotion should be used for 3 weeks every day.   buPROPion (WELLBUTRIN) 75 mg tablet   Yes No   Sig: Take 300 mg by mouth in the morning   clobetasol (TEMOVATE) 0.05 % external solution   No No   Sig: APPLY TOPICALLY 2 (TWO) TIMES A DAY TO SCALP FOR UP TO 2 WEEKS AT A TIME. PLEASE ALLOW ONE WEEK BREAK IN BETWEEN USES.   ketoconazole (NIZORAL) 2 % shampoo   No No   Sig:  Apply 1 Application topically 2 (two) times a week   zolpidem (AMBIEN) 10 mg tablet   Yes No   Sig: Take 10 mg by mouth daily at bedtime as needed      Facility-Administered Medications: None       Past Medical History:   Diagnosis Date    Anemia     Anxiety disorder     Bipolar disorder (HCC)     BMI 34.0-34.9,adult 2023    Psychiatric disorder        Past Surgical History:   Procedure Laterality Date     SECTION      , ,     DENTAL SURGERY      TUBAL LIGATION         Family History   Problem Relation Age of Onset    Depression Mother     Diabetes Mother     Arthritis Father     Bipolar disorder Son     Anxiety disorder Son     Depression Son     Anxiety disorder Daughter     Bipolar disorder Daughter     Depression Daughter      I have reviewed and agree with the history as documented.    E-Cigarette/Vaping    E-Cigarette Use Never User      E-Cigarette/Vaping Substances    Nicotine No     THC No     CBD No     Flavoring No     Other No     Unknown No      Social History     Tobacco Use    Smoking status: Never    Smokeless tobacco: Never   Vaping Use    Vaping status: Never Used   Substance Use Topics    Alcohol use: No    Drug use: No       Review of Systems   Constitutional:  Negative for chills, diaphoresis, fever and unexpected weight change.   HENT:  Negative for ear pain and sore throat.    Eyes:  Negative for visual disturbance.   Respiratory:  Negative for cough, chest tightness and shortness of breath.    Cardiovascular:  Negative for chest pain and leg swelling.   Gastrointestinal:  Negative for abdominal distention, abdominal pain, constipation, diarrhea, nausea and vomiting.   Endocrine: Negative.    Genitourinary:  Negative for difficulty urinating and dysuria.   Musculoskeletal: Negative.    Skin: Negative.    Allergic/Immunologic: Negative.    Neurological: Negative.    Hematological: Negative.    Psychiatric/Behavioral:  The patient is nervous/anxious.    All other  systems reviewed and are negative.      Physical Exam  Physical Exam  Vitals and nursing note reviewed.   Constitutional:       General: She is not in acute distress.     Appearance: Normal appearance. She is not ill-appearing.   HENT:      Head: Normocephalic and atraumatic.      Right Ear: External ear normal.      Left Ear: External ear normal.      Nose: Nose normal.      Mouth/Throat:      Mouth: Mucous membranes are moist.      Pharynx: Oropharynx is clear.   Eyes:      General: No scleral icterus.        Right eye: No discharge.         Left eye: No discharge.      Extraocular Movements: Extraocular movements intact.      Conjunctiva/sclera: Conjunctivae normal.      Pupils: Pupils are equal, round, and reactive to light.   Cardiovascular:      Rate and Rhythm: Normal rate and regular rhythm.      Pulses: Normal pulses.      Heart sounds: Normal heart sounds.   Pulmonary:      Effort: Pulmonary effort is normal.      Breath sounds: Normal breath sounds.   Abdominal:      General: Abdomen is flat. Bowel sounds are normal. There is no distension.      Palpations: Abdomen is soft.      Tenderness: There is no abdominal tenderness. There is no guarding or rebound.   Musculoskeletal:         General: Normal range of motion.      Cervical back: Normal range of motion and neck supple.   Skin:     General: Skin is warm and dry.      Capillary Refill: Capillary refill takes less than 2 seconds.   Neurological:      General: No focal deficit present.      Mental Status: She is alert and oriented to person, place, and time. Mental status is at baseline.   Psychiatric:         Mood and Affect: Mood normal.         Behavior: Behavior normal.         Thought Content: Thought content normal.         Judgment: Judgment normal.         Vital Signs  ED Triage Vitals [09/04/24 0948]   Temperature Pulse Respirations Blood Pressure SpO2   98.3 °F (36.8 °C) (!) 107 16 (!) 189/95 97 %      Temp Source Heart Rate Source Patient  Position - Orthostatic VS BP Location FiO2 (%)   Oral Monitor Sitting Left arm --      Pain Score       No Pain           Vitals:    09/04/24 0948 09/04/24 1156 09/04/24 1428 09/04/24 1722   BP: (!) 189/95 (!) 207/106 (!) 178/73 144/65   Pulse: (!) 107 101 (!) 114 98   Patient Position - Orthostatic VS: Sitting Sitting Lying          Visual Acuity      ED Medications  Medications   zolpidem (AMBIEN) tablet 10 mg (has no administration in time range)   LORazepam (ATIVAN) tablet 0.5 mg (0.5 mg Oral Given 9/4/24 1306)   metoprolol tartrate (LOPRESSOR) tablet 50 mg (50 mg Oral Given 9/4/24 1450)   ALPRAZolam (XANAX) tablet 0.5 mg (0.5 mg Oral Given 9/4/24 1954)       Diagnostic Studies  Results Reviewed       Procedure Component Value Units Date/Time    TSH [[id number]]  (Abnormal) Collected: 09/04/24 1303    Lab Status: Final result Specimen: Blood from Hand, Left Updated: 09/04/24 1353     TSH 3RD GENERATON 4.770 uIU/mL     Rapid drug screen, urine [[id number]]  (Abnormal) Collected: 09/04/24 1307    Lab Status: Final result Specimen: Urine, Clean Catch Updated: 09/04/24 1345     Amph/Meth UR Negative     Barbiturate Ur Negative     Benzodiazepine Urine Positive     Cocaine Urine Negative     Methadone Urine Negative     Opiate Urine Negative     PCP Ur Negative     THC Urine Negative     Oxycodone Urine Negative     Fentanyl Urine Negative     HYDROCODONE URINE Negative    Narrative:      Presumptive report. If requested, specimen will be sent to reference lab for confirmation.  FOR MEDICAL PURPOSES ONLY.   IF CONFIRMATION NEEDED PLEASE CONTACT THE LAB WITHIN 5 DAYS.    Drug Screen Cutoff Levels:  AMPHETAMINE/METHAMPHETAMINES  1000 ng/mL  BARBITURATES     200 ng/mL  BENZODIAZEPINES     200 ng/mL  COCAINE      300 ng/mL  METHADONE      300 ng/mL  OPIATES      300 ng/mL  PHENCYCLIDINE     25 ng/mL  THC       50 ng/mL  OXYCODONE      100 ng/mL  FENTANYL      5 ng/mL  HYDROCODONE     300 ng/mL    Comprehensive metabolic  panel [[id number]] Collected: 09/04/24 1303    Lab Status: Final result Specimen: Blood from Hand, Left Updated: 09/04/24 1337     Sodium 138 mmol/L      Potassium 4.1 mmol/L      Chloride 102 mmol/L      CO2 26 mmol/L      ANION GAP 10 mmol/L      BUN 11 mg/dL      Creatinine 0.64 mg/dL      Glucose 93 mg/dL      Calcium 9.8 mg/dL      AST 17 U/L      ALT 27 U/L      Alkaline Phosphatase 56 U/L      Total Protein 8.1 g/dL      Albumin 4.9 g/dL      Total Bilirubin 0.42 mg/dL      eGFR 96 ml/min/1.73sq m     Narrative:      National Kid[employer] Disease Foundation guidelines for Chronic Kidney Disease (CKD):     Stage 1 with normal or high GFR (GFR > 90 mL/min/1.73 square meters)    Stage 2 Mild CKD (GFR = 60-89 mL/min/1.73 square meters)    Stage 3A Moderate CKD (GFR = 45-59 mL/min/1.73 square meters)    Stage 3B Moderate CKD (GFR = 30-44 mL/min/1.73 square meters)    Stage 4 Severe CKD (GFR = 15-29 mL/min/1.73 square meters)    Stage 5 End Stage CKD (GFR <15 mL/min/1.73 square meters)  Note: GFR calculation is accurate only with a steady state creatinine    Urine Microscopic [[id number]]  (Abnormal) Collected: 09/04/24 1308    Lab Status: Final result Specimen: Urine, Clean Catch Updated: 09/04/24 1324     RBC, UA None Seen /hpf      WBC, UA 2-4 /hpf      Epithelial Cells Occasional /hpf      Bacteria, UA Occasional /hpf     UA w Reflex to Microscopic w Reflex to Culture [[id number]]  (Abnormal) Collected: 09/04/24 1308    Lab Status: Final result Specimen: Urine, Clean Catch Updated: 09/04/24 1321     Color, UA Light Yellow     Clarity, UA Clear     Specific Gravity, UA 1.011     pH, UA 6.0     Leukocytes, UA Small     Nitrite, UA Negative     Protein, UA Negative mg/dl      Glucose, UA Negative mg/dl      Ketones, UA Negative mg/dl      Urobilinogen, UA <2.0 mg/dl      Bilirubin, UA Negative     Occult Blood, UA Negative    CBC and differential [[id number]] Collected: 09/04/24 1303    Lab Status: Final result Specimen:  Blood from Hand, Left Updated: 09/04/24 1318     WBC 5.99 Thousand/uL      RBC 5.01 Million/uL      Hemoglobin 14.4 g/dL      Hematocrit 43.7 %      MCV 87 fL      MCH 28.7 pg      MCHC 33.0 g/dL      RDW 12.3 %      MPV 11.2 fL      Platelets 235 Thousands/uL      nRBC 0 /100 WBCs      Segmented % 72 %      Immature Grans % 0 %      Lymphocytes % 17 %      Monocytes % 9 %      Eosinophils Relative 1 %      Basophils Relative 1 %      Absolute Neutrophils 4.33 Thousands/µL      Absolute Immature Grans 0.02 Thousand/uL      Absolute Lymphocytes 1.04 Thousands/µL      Absolute Monocytes 0.52 Thousand/µL      Eosinophils Absolute 0.04 Thousand/µL      Basophils Absolute 0.04 Thousands/µL     POCT alcohol breath test [[id number]]  (Normal) Resulted: 09/04/24 1302    Lab Status: Final result Updated: 09/04/24 1302     EXTBreath Alcohol 0.000                   No orders to display              Procedures  Procedures         ED Course                                 SBIRT 20yo+      Flowsheet Row Most Recent Value   Initial Alcohol Screen: US AUDIT-C     1. How often do you have a drink containing alcohol? 0 Filed at: 09/04/2024 0951   2. How many drinks containing alcohol do you have on a typical day you are drinking?  0 Filed at: 09/04/2024 0951   3a. Male UNDER 65: How often do you have five or more drinks on one occasion? 0 Filed at: 09/04/2024 0951   3b. FEMALE Any Age, or MALE 65+: How often do you have 4 or more drinks on one occassion? 0 Filed at: 09/04/2024 0951   Audit-C Score 0 Filed at: 09/04/2024 0951   ABBY: How many times in the past year have you...    Used an illegal drug or used a prescription medication for non-medical reasons? Never Filed at: 09/04/2024 0951                      Medical Decision Making  63-year-old female presents with worsening depression  Patient endorses no other symptoms  Patient is hypertensive but endorses no symptoms of endorgan damage.  Patient denies any urinary symptoms, chest  pain, headache  Will obtain behavioral health labs and attempt at blood pressure control  Crisis consult placed    Problems Addressed:  Depression: acute illness or injury    Amount and/or Complexity of Data Reviewed  Labs: ordered.    Risk  Prescription drug management.  Decision regarding hospitalization.                 Disposition  Final diagnoses:   Depression     Time reflects when diagnosis was documented in both MDM as applicable and the Disposition within this note       Time User Action Codes Description Comment    9/4/2024  4:44 PM Gómez Doherty Add [F32.A] Depression           ED Disposition       ED Disposition   Transfer to Behavioral Health Condition   --    Date/Time   Wed Sep 4, 2024 1644    Comment   Jolynn Guillory should be transferred out to  and has been medically cleared.               MD Documentation      Flowsheet Row Most Recent Value   Patient Condition The patient has been stabilized such that within reasonable medical probability, no material deterioration of the patient condition or the condition of the unborn child(ángel) is likely to result from the transfer   Reason for Transfer Level of Care needed not available at this facility   Benefits of Transfer Other benefits (Include comment)_______________________  [Inpt MH tx]   Risks of Transfer Potential for delay in receiving treatment   Accepting Physician Dr. Teran   Accepting Facility Name, [facility]    (Name & Tel number) Rosy Brownlee LCSW   Transported by (Company and Unit #) [employer]   Sending MD Dr. Dc   Provider Certification General risk, such as traffic hazards, adverse weather conditions, rough terrain or turbulence, possible failure of equipment (including vehicle or aircraft), or consequences of actions of persons outside the control of the transport personnel          RN Documentation      Flowsheet Row Most Recent Value   Accepting Facility Name, [facility]     (Name & Tel number) Rosy Brownlee LCSW   Transported by (Company and Unit #) [employer]          Follow-up Information    None         Patient's Medications   Discharge Prescriptions    No medications on file       No discharge procedures on file.    PDMP Review       None            ED Provider  Electronically Signed by             Gómez Doherty MD  09/04/24 4180

## 2024-09-04 NOTE — ED NOTES
Pt is a 63 y.o. female who was brought to the ED with  due to worsening depression. Patient is primarily Romanian speaking, so interpretor #[id number], Francisco, was used to complete assessment. Patient's  is at bed side and appears to understand/speak more English so assisted patient throughout. Patient reports worsening depressive symptoms over the last two months without any identifiable trigger. She expressed feeling low wnergy, increased anxiety and worry, lack of interest in all activities, increased stress, and always feeling tired. Patient's , daughter, and son have all noticed a concerning change in patient's mood and encouraged her to come to the hospital for treatment. Patient has previous history of suicidal ideations, but denies previous attempts. She denies current suicidal/homicidal ideations and auditory/visual hallucinations.     Patient has one previous inpatient admission in [place] many years ago. She is currently active with therapy and psychiatry through [facility]. She attends therapy monthly and is compliant with her medications. Patient denies recent medication changes and has been on her current regiment for about 4 months. She feels she has been a little bit better with the medications. She denies significant issues with sleep and appetite. Patient is visibly depressed throughout assessment and feels as though her depression is too severe to manage on her own at this point. Patient does feel she would benefit most from inpatient treatment and was agreeable to sign a 201. Bed Search process was explained to patient and her  who did not have additional questions.     Chief Complaint   Patient presents with    Psychiatric Evaluation     Pt states that she has been feeling more depressed lately and doesn't feel like medication is working anymore. In additon to depression, Pt has been trembling, low energy, fatigue and loss of interest. Denies SI/HI     Intake  Assessment completed, Safety risk Assessment completed    Rosy Brownlee, [facility]  09/04/24    [id number]

## 2024-09-05 PROBLEM — F31.4 BIPOLAR DISORDER WITH SEVERE DEPRESSION (HCC): Status: ACTIVE | Noted: 2018-02-22

## 2024-09-05 LAB
25(OH)D3 SERPL-MCNC: 31.8 NG/ML (ref 30–100)
ALBUMIN SERPL BCG-MCNC: 4.3 G/DL (ref 3.5–5)
ALP SERPL-CCNC: 42 U/L (ref 34–104)
ALT SERPL W P-5'-P-CCNC: 21 U/L (ref 7–52)
ANION GAP SERPL CALCULATED.3IONS-SCNC: 7 MMOL/L (ref 4–13)
AST SERPL W P-5'-P-CCNC: 15 U/L (ref 13–39)
ATRIAL RATE: 86 BPM
BASOPHILS # BLD AUTO: 0.03 THOUSANDS/ÂΜL (ref 0–0.1)
BASOPHILS NFR BLD AUTO: 0 % (ref 0–1)
BILIRUB SERPL-MCNC: 0.65 MG/DL (ref 0.2–1)
BUN SERPL-MCNC: 12 MG/DL (ref 5–25)
CALCIUM SERPL-MCNC: 9.5 MG/DL (ref 8.4–10.2)
CHLORIDE SERPL-SCNC: 103 MMOL/L (ref 96–108)
CHOLEST SERPL-MCNC: 157 MG/DL
CO2 SERPL-SCNC: 26 MMOL/L (ref 21–32)
CREAT SERPL-MCNC: 0.63 MG/DL (ref 0.6–1.3)
EOSINOPHIL # BLD AUTO: 0.11 THOUSAND/ÂΜL (ref 0–0.61)
EOSINOPHIL NFR BLD AUTO: 2 % (ref 0–6)
ERYTHROCYTE [DISTWIDTH] IN BLOOD BY AUTOMATED COUNT: 12.4 % (ref 11.6–15.1)
FOLATE SERPL-MCNC: 21.1 NG/ML
GFR SERPL CREATININE-BSD FRML MDRD: 97 ML/MIN/1.73SQ M
GLUCOSE P FAST SERPL-MCNC: 99 MG/DL (ref 65–99)
GLUCOSE SERPL-MCNC: 99 MG/DL (ref 65–140)
HCT VFR BLD AUTO: 40 % (ref 34.8–46.1)
HDLC SERPL-MCNC: 46 MG/DL
HGB BLD-MCNC: 12.9 G/DL (ref 11.5–15.4)
IMM GRANULOCYTES # BLD AUTO: 0.01 THOUSAND/UL (ref 0–0.2)
IMM GRANULOCYTES NFR BLD AUTO: 0 % (ref 0–2)
LDLC SERPL CALC-MCNC: 94 MG/DL (ref 0–100)
LYMPHOCYTES # BLD AUTO: 1.64 THOUSANDS/ÂΜL (ref 0.6–4.47)
LYMPHOCYTES NFR BLD AUTO: 22 % (ref 14–44)
MCH RBC QN AUTO: 28.9 PG (ref 26.8–34.3)
MCHC RBC AUTO-ENTMCNC: 32.3 G/DL (ref 31.4–37.4)
MCV RBC AUTO: 90 FL (ref 82–98)
MONOCYTES # BLD AUTO: 0.65 THOUSAND/ÂΜL (ref 0.17–1.22)
MONOCYTES NFR BLD AUTO: 9 % (ref 4–12)
NEUTROPHILS # BLD AUTO: 4.95 THOUSANDS/ÂΜL (ref 1.85–7.62)
NEUTS SEG NFR BLD AUTO: 67 % (ref 43–75)
NONHDLC SERPL-MCNC: 111 MG/DL
NRBC BLD AUTO-RTO: 0 /100 WBCS
P AXIS: 29 DEGREES
PLATELET # BLD AUTO: 204 THOUSANDS/UL (ref 149–390)
PMV BLD AUTO: 11.9 FL (ref 8.9–12.7)
POTASSIUM SERPL-SCNC: 4 MMOL/L (ref 3.5–5.3)
PR INTERVAL: 162 MS
PROT SERPL-MCNC: 7.4 G/DL (ref 6.4–8.4)
QRS AXIS: 14 DEGREES
QRSD INTERVAL: 76 MS
QT INTERVAL: 364 MS
QTC INTERVAL: 435 MS
RBC # BLD AUTO: 4.47 MILLION/UL (ref 3.81–5.12)
SODIUM SERPL-SCNC: 136 MMOL/L (ref 135–147)
T WAVE AXIS: 39 DEGREES
TREPONEMA PALLIDUM IGG+IGM AB [PRESENCE] IN SERUM OR PLASMA BY IMMUNOASSAY: NORMAL
TRIGL SERPL-MCNC: 87 MG/DL
VENTRICULAR RATE: 86 BPM
VIT B12 SERPL-MCNC: 293 PG/ML (ref 180–914)
WBC # BLD AUTO: 7.39 THOUSAND/UL (ref 4.31–10.16)

## 2024-09-05 PROCEDURE — 82746 ASSAY OF FOLIC ACID SERUM: CPT | Performed by: PSYCHIATRY & NEUROLOGY

## 2024-09-05 PROCEDURE — 85025 COMPLETE CBC W/AUTO DIFF WBC: CPT | Performed by: PSYCHIATRY & NEUROLOGY

## 2024-09-05 PROCEDURE — 82306 VITAMIN D 25 HYDROXY: CPT | Performed by: PSYCHIATRY & NEUROLOGY

## 2024-09-05 PROCEDURE — 86780 TREPONEMA PALLIDUM: CPT | Performed by: PSYCHIATRY & NEUROLOGY

## 2024-09-05 PROCEDURE — 93005 ELECTROCARDIOGRAM TRACING: CPT

## 2024-09-05 PROCEDURE — 82607 VITAMIN B-12: CPT | Performed by: PSYCHIATRY & NEUROLOGY

## 2024-09-05 PROCEDURE — 93010 ELECTROCARDIOGRAM REPORT: CPT | Performed by: INTERNAL MEDICINE

## 2024-09-05 PROCEDURE — 80053 COMPREHEN METABOLIC PANEL: CPT | Performed by: PSYCHIATRY & NEUROLOGY

## 2024-09-05 PROCEDURE — 80061 LIPID PANEL: CPT | Performed by: PSYCHIATRY & NEUROLOGY

## 2024-09-05 PROCEDURE — 99223 1ST HOSP IP/OBS HIGH 75: CPT | Performed by: PSYCHIATRY & NEUROLOGY

## 2024-09-05 RX ORDER — GABAPENTIN 100 MG/1
100 CAPSULE ORAL 3 TIMES DAILY
Status: DISCONTINUED | OUTPATIENT
Start: 2024-09-05 | End: 2024-09-10

## 2024-09-05 RX ORDER — FLUTICASONE PROPIONATE 50 MCG
1 SPRAY, SUSPENSION (ML) NASAL 2 TIMES DAILY PRN
Status: DISCONTINUED | OUTPATIENT
Start: 2024-09-05 | End: 2024-09-12 | Stop reason: HOSPADM

## 2024-09-05 RX ORDER — LORAZEPAM 0.5 MG/1
0.5 TABLET ORAL EVERY 6 HOURS PRN
Status: DISCONTINUED | OUTPATIENT
Start: 2024-09-05 | End: 2024-09-12 | Stop reason: HOSPADM

## 2024-09-05 RX ORDER — TRAZODONE HYDROCHLORIDE 50 MG/1
50 TABLET, FILM COATED ORAL
Status: DISCONTINUED | OUTPATIENT
Start: 2024-09-05 | End: 2024-09-06

## 2024-09-05 RX ORDER — HYDROXYZINE HYDROCHLORIDE 50 MG/1
50 TABLET, FILM COATED ORAL
Status: DISCONTINUED | OUTPATIENT
Start: 2024-09-05 | End: 2024-09-12 | Stop reason: HOSPADM

## 2024-09-05 RX ORDER — LORATADINE 10 MG/1
10 TABLET ORAL DAILY
Status: DISCONTINUED | OUTPATIENT
Start: 2024-09-05 | End: 2024-09-12 | Stop reason: HOSPADM

## 2024-09-05 RX ADMIN — Medication 3 MG: at 22:00

## 2024-09-05 RX ADMIN — GABAPENTIN 100 MG: 100 CAPSULE ORAL at 12:15

## 2024-09-05 RX ADMIN — TRAZODONE HYDROCHLORIDE 50 MG: 50 TABLET ORAL at 00:04

## 2024-09-05 RX ADMIN — GABAPENTIN 100 MG: 100 CAPSULE ORAL at 16:52

## 2024-09-05 RX ADMIN — TRAZODONE HYDROCHLORIDE 50 MG: 50 TABLET ORAL at 22:00

## 2024-09-05 RX ADMIN — LORATADINE 10 MG: 10 TABLET ORAL at 12:15

## 2024-09-05 RX ADMIN — Medication 3 MG: at 00:03

## 2024-09-05 RX ADMIN — GABAPENTIN 100 MG: 100 CAPSULE ORAL at 22:00

## 2024-09-05 NOTE — NURSING NOTE
Patient is visible in milieu for breakfast meal and is social with select peer. Speaks/understands Yoruba and English language. Patient is pleasant and appropriate to circumstances. Patient presents as anxious. Endorses anxiety and depression. Denies SI/HI/AVH. No complaints offered at this time. No acute behaviors noted. Q 7 min safety checks maintained. Fall precautions maintained.

## 2024-09-05 NOTE — NURSING NOTE
Patient utilized prn Trazodone @ hs w/ positive effect as pt slept throughout the night without interruption, non labored breathing noted while asleep. Q 7 min safety checks maintained.

## 2024-09-05 NOTE — PROGRESS NOTES
09/4/24     Team Meeting   Meeting Type Daily Rounds   Team Members Present   Team Members Present Physician;Occupational Therapist;Nurse;   Physician Team Member Dr Katelyn SCHNEIDER   Nursing Team Member Shannon BROWN   Social Work Team Member Abrahan CLANCY   OT Team Member Sacha CHENEY   Patient/Family Present   Patient Present No   Patient's Family Present No   New admit, 201, Norwegian, increased anx/dep, loss of interest, hx of si/ah, med adjustments, flat, cooperative, Ambien use

## 2024-09-05 NOTE — H&P
Markie Guillory#  :1960 F  MRN:[id number]    CSN:[id number]  Adm Date: 2024  11:27 PM   ATT PHY: Jean Marie Zepeda Md  [facility]         Chief Complaint: worsening depression and anxiety      History of Presenting Illness: Jolynn Guillory is a(n) 63 y.o. year old female who is admitted to [facility] on voluntary 201 commitment basis.  Patient originally presented to [facility] ED on 24 for worsening depression and anxiety.     Patient examined at bedside.  Patient reports having seasonal allergies and taking Benadryl as needed at home.  She is agreeable to try Claritin daily and Flonase as needed.  She otherwise denies any other physical complaints at this time.     No Known Allergies    Current Facility-Administered Medications on File Prior to Encounter   Medication Dose Route Frequency Provider Last Rate Last Admin    [COMPLETED] ALPRAZolam (XANAX) tablet 0.5 mg  0.5 mg Oral Once Gómez Doherty MD   0.5 mg at 24    [COMPLETED] LORazepam (ATIVAN) tablet 0.5 mg  0.5 mg Oral Once Gómez Doherty MD   0.5 mg at 24 1306    [COMPLETED] metoprolol tartrate (LOPRESSOR) tablet 50 mg  50 mg Oral Once Gómez Doherty MD   50 mg at 24 1450    [DISCONTINUED] labetalol (NORMODYNE) tablet 100 mg  100 mg Oral Once Gómez Doherty MD        [DISCONTINUED] zolpidem (AMBIEN) tablet 10 mg  10 mg Oral HS PRN Gómez Doherty MD         Current Outpatient Medications on File Prior to Encounter   Medication Sig Dispense Refill    ALPRAZolam (XANAX) 0.5 mg tablet Take 1 mg by mouth 2 (two) times a day as needed for anxiety      ARIPiprazole (ABILIFY) 5 mg tablet Take 15 mg by mouth daily      Blood Pressure KIT by Does not apply route daily 1 each 0    buPROPion (WELLBUTRIN) 75 mg tablet Take 300 mg by mouth in the morning      acetaminophen (TYLENOL) 325 mg tablet Take 2 tablets (650 mg total) by mouth every 6 (six) hours as needed  for mild pain or fever 30 tablet 0    amcinonide (CYCLOCORT) 0.1 % lotion Apply topically 2 (two) times a day Please use 2 times a day, preferably after you have showered and before bed, and spread evenly across the scalp. Please use this in combination with your medicated shampoos. This steroid lotion should be used for 3 weeks every day. 60 mL 0    clobetasol (TEMOVATE) 0.05 % external solution APPLY TOPICALLY 2 (TWO) TIMES A DAY TO SCALP FOR UP TO 2 WEEKS AT A TIME. PLEASE ALLOW ONE WEEK BREAK IN BETWEEN USES. (Patient not taking: Reported on 9/5/2024) 50 mL 1    Diclofenac Sodium (VOLTAREN) 1 % Apply 4 g topically 4 (four) times a day 480 g 0    ketoconazole (NIZORAL) 2 % shampoo Apply 1 Application topically 2 (two) times a week 120 mL 11    zolpidem (AMBIEN) 10 mg tablet Take 10 mg by mouth daily at bedtime as needed       Active Ambulatory Problems     Diagnosis Date Noted    Bipolar disorder with severe depression (HCC) 02/22/2018    Insomnia 02/22/2018    Neuropathy 05/22/2018    Cervical paraspinal muscle spasm 05/22/2018    Elevated glucose 06/19/2018    Back pain 11/05/2018    Bursitis 11/05/2020    Dandruff 02/14/2022    Psoriasis 04/01/2022    Sciatica of right side 04/01/2022    Gastroesophageal reflux disease without esophagitis 04/01/2022    Prediabetes 05/10/2022    Annual physical exam 10/12/2023    Encounter for screening mammogram for breast cancer 10/12/2023    Cervical cancer screening 10/12/2023    Obesity (BMI 35.0-39.9 without comorbidity) 10/12/2023    BMI 40.0-44.9, adult (HCC) 08/29/2024    Screening due 08/29/2024    Financial difficulties 08/29/2024    Elevated BP without diagnosis of hypertension 08/29/2024     Resolved Ambulatory Problems     Diagnosis Date Noted    Encounter for hepatitis C screening test for low risk patient 02/22/2018    Viral upper respiratory tract infection 02/22/2018    Stress incontinence 02/22/2018    Left knee pain 10/28/2020    Screening for lipid  disorders 2022    Swelling of ankle, right 2022    BMI 34.0-34.9,adult 2023    Acute maxillary sinusitis 2024     Past Medical History:   Diagnosis Date    Anemia     Anxiety disorder     Bipolar disorder (HCC)     Psychiatric disorder      Past Surgical History:   Procedure Laterality Date     SECTION      , ,     DENTAL SURGERY      TUBAL LIGATION       Social History:   Social History     Socioeconomic History    Marital status: /Civil Union     Spouse name: None    Number of children: None    Years of education: None    Highest education level: None   Occupational History    None   Tobacco Use    Smoking status: Never    Smokeless tobacco: Never   Vaping Use    Vaping status: Never Used   Substance and Sexual Activity    Alcohol use: No    Drug use: No    Sexual activity: Not Currently     Partners: Male     Birth control/protection: Condom Female   Other Topics Concern    None   Social History Narrative    None     Social Determinants of Health     Financial Resource Strain: Low Risk  (2024)    Overall Financial Resource Strain (CARDIA)     Difficulty of Paying Living Expenses: Not very hard   Food Insecurity: No Food Insecurity (2024)    Hunger Vital Sign     Worried About Running Out of Food in the Last Year: Never true     Ran Out of Food in the Last Year: Never true   Transportation Needs: No Transportation Needs (2024)    PRAPARE - Transportation     Lack of Transportation (Medical): No     Lack of Transportation (Non-Medical): No   Physical Activity: Insufficiently Active (2021)    Exercise Vital Sign     Days of Exercise per Week: 2 days     Minutes of Exercise per Session: 10 min   Stress: No Stress Concern Present (2021)    [employer] [place] [employer] - Occupational Stress Questionnaire     Feeling of Stress : Only a little   Social Connections: Moderately Integrated (2021)    Social Connection and Isolation  Panel [NHANES]     Frequency of Communication with Friends and Family: Twice a week     Frequency of Social Gatherings with Friends and Family: More than three times a week     Attends Buddhist Services: More than 4 times per year     Active Member of Clubs or Organizations: Yes     Attends Club or Organization Meetings: 1 to 4 times per year     Marital Status:    Intimate Partner Violence: Not At Risk (7/8/2021)    Humiliation, Afraid, Rape, and Kick questionnaire     Fear of Current or Ex-Partner: No     Emotionally Abused: No     Physically Abused: No     Sexually Abused: No   Housing Stability: Low Risk  (8/26/2024)    Housing Stability Vital Sign     Unable to Pay for Housing in the Last Year: No     Number of Times Moved in the Last Year: 1     Homeless in the Last Year: No     Family History:   Family History   Problem Relation Age of Onset    Depression Mother     Diabetes Mother     Arthritis Father     Bipolar disorder Son     Anxiety disorder Son     Depression Son     Anxiety disorder Daughter     Bipolar disorder Daughter     Depression Daughter      Review of Systems   Constitutional:  Negative for chills and fever.   HENT:  Negative for ear pain and sore throat.    Eyes:  Negative for pain and visual disturbance.   Respiratory:  Negative for cough and shortness of breath.    Cardiovascular:  Negative for chest pain and palpitations.   Gastrointestinal:  Negative for abdominal pain, constipation, diarrhea, nausea and vomiting.   Genitourinary:  Negative for dysuria and hematuria.   Musculoskeletal:  Positive for arthralgias and back pain. Negative for joint swelling.   Skin:  Negative for color change and rash.   Allergic/Immunologic: Positive for environmental allergies.   Neurological:  Negative for dizziness and headaches.   Psychiatric/Behavioral:  Positive for dysphoric mood. The patient is nervous/anxious.    All other systems reviewed and are negative.    Physical Exam   Vitals: Blood  "pressure 119/71, pulse 82, temperature 97.6 °F (36.4 °C), temperature source Temporal, resp. rate 18, height 4' 8\" (1.422 m), weight 85.1 kg (187 lb 9.6 oz), SpO2 98%.,Body mass index is 42.06 kg/m².  Constitutional: Awake, alert, in no acute distress.  Head: Normocephalic and atraumatic.   Mouth/Throat: Oropharynx is clear and moist.    Eyes: Conjunctivae and EOM are normal.   Neck: Neck supple. No thyromegaly present.   Cardiovascular: Normal rate, regular rhythm and normal heart sounds.    Pulmonary/Chest: Effort normal and breath sounds normal.   Abdominal: Soft. Bowel sounds are normal. There is no tenderness. There is no rebound and no guarding.   Neurological: No focal deficits.   Skin: Skin is warm and dry.     Assessment     Jolynn Guillory is a(n) 63 y.o. female with bipolar disorder.    GERD.  Stable.  Maalox as needed.  Prediabetes.  Hgb A1c 5.8% on 8/30/24.  Diet controlled.  DJD/OA/chronic back pain.  Tylenol as needed.  Seasonal allergies.  Start Claritin 10 mg daily and Flonase as needed 9/5.  Psoriasis.  Stable at this time.  Bipolar disorder.  This is being managed by the psych team.      Prognosis: Fair.    Discharge Plan: In progress.    Advanced Directives: I have discussed in detail with the patient the advanced directives. The patient does not have an appointed POA but states she does have a living will.  Her emergency contact is her son, Willie Flores, whose number is [phone].  When discussing cardiac and pulmonary resuscitation efforts with the patient, the patient wishes to be full code.    I have spent more than 50 minutes gathering data, doing physical examination, and discussing the advanced directives, which was witnessed by caring staff.    The patient was discussed with Dr. Mora and he is in agreement with the above note.  "

## 2024-09-05 NOTE — ED NOTES
Patient is accepted at [facility].  Patient is accepted by Dr. Parul Adamson in Intake.     Transportation is arranged with [employer].     Transportation is scheduled for 2300 with [facility].   Patient may go to the floor after 2300.          *Nurse report is to be called to [phone] prior to patient transfer.*        Rosy Brownlee, APOLLO  09/04/24 2031

## 2024-09-05 NOTE — PLAN OF CARE
Problem: DISCHARGE PLANNING  Goal: Discharge to home or other facility with appropriate resources  Description: INTERVENTIONS:  - Identify barriers to discharge w/patient and caregiver  - Arrange for needed discharge resources and transportation as appropriate  - Identify discharge learning needs (meds, wound care, etc.)  - Arrange for interpretive services to assist at discharge as needed  - Refer to Case Management Department for coordinating discharge planning if the patient needs post-hospital services based on physician/advanced practitioner order or complex needs related to functional status, cognitive ability, or social support system  Outcome: Progressing   New goal 201

## 2024-09-05 NOTE — ED NOTES
Insurance Authorization for admission:   Phone call placed to [facility].  Phone number: [phone].     Spoke to Anh.     5 days approved.  Level of care: Acute Inpt  (201).  Review on 09/08/2024.   Authorization # to be obtained by accepting facility upon arrival.        Eligibility Verification System checked - ([phone]).  Online system / automated system indicates: Active with [facility] (ID# [id number]).    Insurance Authorization for Transportation:    Not required for [facility] transport.     Rosy Brownlee, APOLLO  09/04/24    3525

## 2024-09-05 NOTE — PROGRESS NOTES
09/05/24 1400   Team Meeting   Meeting Type Tx Team Meeting   Initial Conference Date 09/05/24   Next Conference Date 10/05/24   Team Members Present   Team Members Present Physician;Nurse;   Physician Team Member Dr. Jean Marie Zepeda   Nursing Team Member Katina Martins RN   Care Management Team Member Janine Forrest RN   Patient/Family Present   Patient Present Yes   Patient's Family Present No     Initial Plan  Treatment Team met and reviewed patient strengths, limitations, coping skills, Treatment Plan and Goals; patient reported understanding and agreement and signed the Treatment Plan document. A copy was placed on the chart.

## 2024-09-05 NOTE — CASE MANAGEMENT
CM placed call to patient's sonAlbertoWillie: [phone] for family notification. Cm left VM message with relevant phone numbers for return call and future contact as needed.     CM placed call to patient's PCP,  Dr. Cooney:   :[phone] for admission notification. VM message left with request for return call re: f/u appt.

## 2024-09-05 NOTE — PROGRESS NOTES
"Psycho Social  Patient was admitted to [facility] on 9/4/24 under a 201, Voluntary Commitment with reported worsening of depressive sx to include loss of interest, motivation, fatigue, withdrawal from usual activities, increase in anxiety and worry. Patient was unable to identify contributing factors/triggers. The interview was conducted with [employer]ist; ID #:[id number]. The patient was receptive and pleasant, though mood depressed, voice tone soft low, affect congruent with mood. Continued to report lack of awareness of triggers or contributing factors. Was focused on d/c.  Current SI: Denied  Current HI: Denied  AVH:           Denied  Depression: Moderate  Anxiety:     Moderate  Strengths: Cooperative, family ties, housing, reasoning ability, able to negotiate needs  Stressors/Limitations:  Limited insight, limited education, poor past treatment response  Coping skills:  Watching TV, reading, writing, painting  HX Mental Health: Reported involvement in OP for \"many years\". Currently receives med mgmt and therapy services at [facility].  Past Hospitalizations: One previous AIP in [place] \"a long time ago.\"  Medication Compliance:  Reported compliance  SA/SI in last 12 months:  Past ideation; no previous attempts  HI/violence towards others in last 12 months: Denied  Access to Firearms:   Denied  Hx abuse/trauma:       Denied  Family HX Mental Health:  Reported her mother having had depression.   Family HX Suicide/Homicide: Denied  Family HX Substance Abuse: Denied  Family HX Dementia:      Denied  Substance Abuse:  Denied  Smoking Cessation: Denied  Legal Issues: Denied  Marital Status:   after 15 years of marriage. Remarried for the past 11 years. Reportedly the relationship is sound.  Sexual Preference: Heterosexual  Children: 2 sons and 1 daughter with whom she reported a close relationship  Parents:  Reportedly has \"a little contact\" with her father. No information re: mother.  Siblings: " "Reports contact maintained with her sister  Pets:   None  Education HX:  4th grade  Type of Work:   No information provided re work she did years ago;  Currently a disability recipient.   HX: None  Alevism Preference:  None reported  Cultural needs:     Financial:  $390.00/ month in  Disability benefits  POA/guardianship/advanced: directives:  None  Pharmacy:      Housing Stability-Dispo/211: No issue  Transportation:  Spouse drives  Food Insecurity:  None  Intimate Partner Violence:  Denied  Utilities:  No issue    Psychiatrist:  Dr. Redmond/ [facility] monthly  Therapist:      [facility] every 3 weeks  PCP:               Katerina Cooney  D&A:               NA  Case Management: None   Family Contact:  SonWillie: [phone]     09/05/24 1603   Patient Intake   Living Arrangement House;Lives with someone   Can patient return home? Yes   Address to be Discharge to: See facesheet   Patient's Telephone Number See facesheet   Access to Firearms No   Work History Disabled   Admission Status   Status of Admission 201   County [place] Residence [place]   Patient History   Treatment History One previous AIP in [place] \"many years ago.\"  Currently in treatment at [facility] for Med Mgmt and Therapy   Currently in Treatment Yes   Current Psychiatrist/Therapist [facility]   Current Treatment Appt Info Monthly Med Mgmt with Dr. Redmond; every three weeks for therapy   Medical Problems See Medical H&P   Legal Issues None reported   Substance Abuse No   Crisis Info   Release of Information Signed Yes  (Psych, PCP, Son)       "

## 2024-09-05 NOTE — PLAN OF CARE
Problem: Ineffective Coping  Goal: Cooperates with admission process  Description: Interventions:   - Complete admission process  Outcome: Progressing   New admit as of 9/4/2024.  Care plan initiated.

## 2024-09-05 NOTE — H&P
"Psychiatric Evaluation - Behavioral Health     Identification Data:Jolynn Guillory 63 y.o. female MRN: [id number]  Unit/Bed#: [facility] 203-01 Encounter: [id number]    Chief Complaint: depression, anxiety, and unstable mood    History of Present Illness     Jolynn Guillory is a 63 y.o. female with a history of depression versus bipolar disorder who was admitted to the inpatient older adult psychiatric unit on a voluntary 201 commitment basis due to depression, anxiety, unstable mood, and inability to care for self. She was initially brought by her  to ED due to severe worsening of depressive symptoms, anxiety, tremor with passive suicidal ideation over the past few months. UDS was positive for benzo. EKG with no acute changes.  On evaluation in the inpatient psychiatric unit Jolynn presents severely depressed, tearful with psychomotor retardation but able to answer question in goal directed way. She reports that she has been feeling increasingly depressed for the past several months with worsening of depressed mood, hopeless, helplessness, anhedonia, increased tearfulness and significant decline in her daily activities. She admits to passive wish to die but denies any active plan or intent to hurt herself and she is able to contract for safety. Patient reports she had one prior psych admission when she was severely depressed and experiencing auditory hallucination telling her to \"get hit by a car\" that culminated receiving treatment in a psych hospital in [facility]. Denies any current auditory hallucination but appears internally preoccupied.  Patient denies any recent alcohol use or illegal drug but UDS was positive for benzo. She has been prescribed Ambien, Xanax recently. Admits being dx with bipolar disorder as she experienced manic episode in the past. Denies paranoia or delusions. She agreed to be compliant with medication and treatment plan in the unit.    Psychiatric Review Of Systems:    Sleep " changes: decreased  Appetite changes:decreased  Weight changes: no  Energy: decreased  Interest/pleasure/: decreased  Anhedonia: no  Anxiety: yes  Silva: currently manic symptoms  Guilt:  no  Hopeless:  no  Self injurious behavior/risky behavior: not recently  Suicidal ideation: yes, passive death wish  Homicidal ideation: no  Auditory hallucinations: not at present  Visual hallucinations: no  Delusional thinking: no  Eating disorder history: no  Obsessive/compulsive symptoms: no    Historical Information     Past Psychiatric History:     Past Inpatient Psychiatric Treatment:   One past inpatient psychiatric admission in [facility] many years ago  Past Outpatient Psychiatric Treatment:    Currently in outpatient psychiatric treatment with a psychiatrist  Past Suicide Attempts: denies  Past Violent Behavior: denies  Past Psychiatric Medication Trials: Wellbutrin, Ambien and Xanax     Substance Abuse History:    Social History       Tobacco History       Smoking Status  Never      Smokeless Tobacco Use  Never              Alcohol History       Alcohol Use Status  No              Drug Use       Drug Use Status  No              Sexual Activity       Sexually Active  Not Currently Partners  Male Birth Control/Protection  Condom Female              Activities of Daily Living    Not Asked                   I have assessed this patient for substance use within the past 12 months    Alcohol use: denies current use  Recreational drug use: UDS was positive for Benzo    Family Psychiatric History: Mother with h/o anxiety and dementia    Social History:    Education: high school graduate  Marital History:   Children: 3 adult children  Living Arrangement: lives in home with spouse  Occupational History: unemployed  Functioning Relationships: spouse is supportive  Legal History: none   History: None    Traumatic History:   Yes    Past Medical History:      Past Medical History:   Diagnosis Date    Anemia     Anxiety  disorder     Bipolar disorder (HCC)     BMI 34.0-34.9,adult 2023    Psychiatric disorder      Past Surgical History:   Procedure Laterality Date     SECTION      , ,     DENTAL SURGERY      TUBAL LIGATION         Medical Review Of Systems:    Pertinent items are noted in HPI.    Allergies:    No Known Allergies    Medications:     All current active medications have been reviewed.    OBJECTIVE:    Vital signs in last 24 hours:    Temp:  [97.6 °F (36.4 °C)-97.8 °F (36.6 °C)] 97.6 °F (36.4 °C)  HR:  [] 82  Resp:  [14-18] 18  BP: (119-207)/() 119/71    No intake or output data in the 24 hours ending 24 1035     Mental Status Evaluation:    Appearance:  age appropriate, overweight   Behavior:  cooperative   Speech:  decreased rate, slow   Mood:  depressed, anxious   Affect:  tearful, reactive   Language: naming objects   Thought Process:  goal directed   Associations: intact associations   Thought Content:  no overt delusions   Perceptual Disturbances: denies auditory hallucinations when asked   Risk Potential: Suicidal ideation - Yes, passive death wish  Homicidal ideation - None  Potential for aggression - No   Sensorium:  oriented to person, place, and time/date   Memory:  recent and remote memory grossly intact   Consciousness:  alert and awake   Attention: attention span and concentration are age appropriate   Intellect: average   Fund of Knowledge: awareness of current events: yes   Insight:  limited   Judgment: fair   Muscle Strength Muscle Tone: normal  normal   Gait/Station: normal gait/station   Motor Activity: no abnormal movements       Laboratory Results:   I have personally reviewed all pertinent laboratory/tests results.  Most Recent Labs:   Lab Results   Component Value Date    WBC 7.39 2024    RBC 4.47 2024    HGB 12.9 2024    HCT 40.0 2024     2024    RDW 12.4 2024    NEUTROABS 4.95 2024    SODIUM 136  09/05/2024    K 4.0 09/05/2024     09/05/2024    CO2 26 09/05/2024    BUN 12 09/05/2024    CREATININE 0.63 09/05/2024    GLUC 99 09/05/2024    GLUF 99 09/05/2024    CALCIUM 9.5 09/05/2024    AST 15 09/05/2024    ALT 21 09/05/2024    ALKPHOS 42 09/05/2024    TP 7.4 09/05/2024    ALB 4.3 09/05/2024    TBILI 0.65 09/05/2024    CHOLESTEROL 157 09/05/2024    HDL 46 (L) 09/05/2024    TRIG 87 09/05/2024    LDLCALC 94 09/05/2024    NONHDLC 111 09/05/2024    UHE4SJIKYNMP 4.770 (H) 09/04/2024    FREET4 0.67 08/30/2024    HGBA1C 5.8 (H) 08/30/2024     08/30/2024       Imaging Studies:   No results found.    Code Status: No Order  Advance Directive and Living Will: <no information>    Assessment & Plan   Principal Problem:    Bipolar disorder with severe depression (HCC)  Active Problems:    Insomnia    Neuropathy    Back pain    Psoriasis    Sciatica of right side    Gastroesophageal reflux disease without esophagitis    Prediabetes    Obesity (BMI 35.0-39.9 without comorbidity)      Patient Strengths: cooperative, negotiates basic needs, patient is on a voluntary commitment     Patient Barriers: chronic pain, limited insight, medical problems, poor physical health    Treatment Plan:     Planned Treatment and Medication Changes:    All current active medications have been reviewed  Encourage group therapy, milieu therapy and occupational therapy  Behavioral Health checks every 7 minutes  Trazodone 50 mg po hs.  Melatonin 3 mg po hs.   Neurontin 100 gm po tid.  Atarax prn for anxiety. Ativan 0.5 mg po prn severe cases.        Risks / Benefits of Treatment:    Risks, benefits, and possible side effects of medications explained to patient and patient verbalizes understanding and agreement for treatment.    Counseling / Coordination of Care:    Patient's presentation on admission and proposed treatment plan discussed with treatment team.  Diagnosis, medication changes and treatment plan reviewed with patient.  Events  leading to admission reviewed with patient.    Inpatient Psychiatric Certification:    Estimated length of stay: 10 midnights      Jean Marie Zepeda MD 09/05/24

## 2024-09-05 NOTE — PROGRESS NOTES
09/05/24 1349   Activity/Group Checklist   Group Admission/Discharge   Attendance Attended   Attendance Duration (min) 0-15   Interactions Interacted appropriately   Affect/Mood Appropriate   Goals Achieved Identified feelings;Discussed coping strategies;Able to listen to others;Able to engage in interactions;Able to reflect/comment on own behavior;Able to manage/cope with feelings;Able to self-disclose;Able to recieve feedback     Patietn agreeable to meet and complete her self assessment with [facility].  Patient information from form can be found in media.

## 2024-09-05 NOTE — TREATMENT PLAN
TREATMENT PLAN REVIEW - Behavioral Health Jolynn Guillory 63 y.o. 1960 female MRN: [id number]    [facility] Room / Bed: [facility] 203/[facility] 203-01 Encounter: [id number]          Admit Date/Time:  9/4/2024 11:27 PM    Treatment Team:   MD Courtney Ruelas RN Loree Smith Pope, LPC Nicole L Saas Isabelle Kenna Patricia Solt, RN Celeste Pawling Veronda Sestok Tammy Moretz Kelly L Strohl, RN    Diagnosis: Principal Problem:    Bipolar disorder with severe depression (HCC)  Active Problems:    Insomnia    Neuropathy    Back pain    Psoriasis    Sciatica of right side    Gastroesophageal reflux disease without esophagitis    Prediabetes    Obesity (BMI 35.0-39.9 without comorbidity)      Patient Strengths/Assets: good insight, negotiates basic needs, patient is on a voluntary commitment    Patient Barriers/Limitations: difficulty adapting, poor insight, poor physical health, self-care deficit    Short Term Goals: decrease in depressive symptoms, decrease in anxiety symptoms, decrease in psychotic symptoms, decrease in suicidal thoughts, ability to stay safe on the unit, improvement in reasoning ability, improvement in self care, sleep improvement, improvement in appetite, mood stabilization, increase in group attendance, acceptance of need for psychiatric treatment, acceptance of psychiatric medications    Long Term Goals: improvement in depression, improvement in anxiety, stabilization of mood, free of suicidal thoughts, improved insight, able to express basic needs, acceptance of need for psychiatric medications, acceptance of need for psychiatric treatment, adequate self care, adequate sleep, adequate appetite, adequate oral intake, appropriate interaction with peers    Progress Towards Goals: starting psychiatric medications as prescribed    Recommended Treatment: medication management, patient medication education, group therapy, milieu  therapy, continued Behavioral Health psychiatric evaluation/assessment process, medical follow up with medical team    Treatment Frequency: daily medication monitoring, group and milieu therapy daily, monitoring through interdisciplinary rounds, monitoring through weekly patient care conferences    Expected Discharge Date: 10 midnights     Discharge Plan: will be determined    Treatment Plan Created/Updated By: Jean Marie Zepeda MD

## 2024-09-05 NOTE — NURSING NOTE
"Patient primarily [place] speaking  services utilized #[id number]. Patient is a 64 y/o female admitted on a 201 for increased anxiety and depression. Patient reports increased depression, nervousness, fatigue, and \"loss of interest in almost everything.\" Patient denies SI/HI/AVH. Denies any history of self-harm/suicide attempts. Reports x 1 fall within the past few months r/t weakness of the b/l lower extremities and believes it's related to her increased anxiety.  Patient reports being med compliant but no longer feels her current medications are effective. Patient cooperative w/ admission process. Presents as fearful and anxious w/ flat affect. Skin assessment conducted and noted to be unremarkable w/ the exception of healing ecchymotic area to left antecubital fossa. Utilized prn Trazodone 50 mg @ 0004 for trouble w/ sleep. PTA med rec complete. Q 7 min safety checks initiated.   "

## 2024-09-06 PROCEDURE — 99232 SBSQ HOSP IP/OBS MODERATE 35: CPT | Performed by: PSYCHIATRY & NEUROLOGY

## 2024-09-06 RX ORDER — TRAZODONE HYDROCHLORIDE 100 MG/1
100 TABLET ORAL
Status: DISCONTINUED | OUTPATIENT
Start: 2024-09-06 | End: 2024-09-12 | Stop reason: HOSPADM

## 2024-09-06 RX ORDER — CYANOCOBALAMIN 1000 UG/ML
1000 INJECTION, SOLUTION INTRAMUSCULAR; SUBCUTANEOUS
Status: DISCONTINUED | OUTPATIENT
Start: 2024-09-07 | End: 2024-09-12 | Stop reason: HOSPADM

## 2024-09-06 RX ORDER — BUPROPION HYDROCHLORIDE 150 MG/1
150 TABLET ORAL DAILY
Status: DISCONTINUED | OUTPATIENT
Start: 2024-09-07 | End: 2024-09-12 | Stop reason: HOSPADM

## 2024-09-06 RX ADMIN — GABAPENTIN 100 MG: 100 CAPSULE ORAL at 08:35

## 2024-09-06 RX ADMIN — LORATADINE 10 MG: 10 TABLET ORAL at 08:35

## 2024-09-06 RX ADMIN — GABAPENTIN 100 MG: 100 CAPSULE ORAL at 16:59

## 2024-09-06 RX ADMIN — Medication 3 MG: at 21:52

## 2024-09-06 RX ADMIN — TRAZODONE HYDROCHLORIDE 100 MG: 100 TABLET ORAL at 21:52

## 2024-09-06 RX ADMIN — GABAPENTIN 100 MG: 100 CAPSULE ORAL at 21:52

## 2024-09-06 NOTE — PLAN OF CARE
Problem: Risk for Self Injury/Neglect  Goal: Treatment Goal: Remain safe during length of stay, learn and adopt new coping skills, and be free of self-injurious ideation, impulses and acts at the time of discharge  Outcome: Progressing  Goal: Attend and participate in unit activities, including therapeutic, recreational, and educational groups  Description: Interventions:  - Provide therapeutic and educational activities daily, encourage attendance and participation, and document same in the medical record  - Obtain collateral information, encourage visitation and family involvement in care   Outcome: Progressing  Goal: Recognize maladaptive responses and adopt new coping mechanisms  Outcome: Progressing  Goal: Complete daily ADLs, including personal hygiene independently, as able  Description: Interventions:  - Observe, teach, and assist patient with ADLS  - Monitor and promote a balance of rest/activity, with adequate nutrition and elimination  Outcome: Progressing     Problem: Depression  Goal: Treatment Goal: Demonstrate behavioral control of depressive symptoms, verbalize feelings of improved mood/affect, and adopt new coping skills prior to discharge  Outcome: Progressing     Problem: Depression  Goal: Verbalize thoughts and feelings  Description: Interventions:  - Assess and re-assess patient's level of risk   - Engage patient in 1:1 interactions, daily, for a minimum of 15 minutes   - Encourage patient to express feelings, fears, frustrations, hopes   Outcome: Progressing     Problem: Depression  Goal: Refrain from harming self  Description: Interventions:  - Monitor patient closely, per order   - Supervise medication ingestion, monitor effects and side effects   Outcome: Progressing     Problem: Depression  Goal: Refrain from isolation  Description: Interventions:  - Develop a trusting relationship   - Encourage socialization   Outcome: Progressing     Problem: Depression  Goal: Refrain from  self-neglect  Outcome: Progressing     Problem: Anxiety  Goal: Anxiety is at manageable level  Description: Interventions:  - Assess and monitor patient's anxiety level.   - Monitor for signs and symptoms (heart palpitations, chest pain, shortness of breath, headaches, nausea, feeling jumpy, restlessness, irritable, apprehensive).   - Collaborate with interdisciplinary team and initiate plan and interventions as ordered.  - [place] patient to unit/surroundings  - Explain treatment plan  - Encourage participation in care  - Encourage verbalization of concerns/fears  - Identify coping mechanisms  - Assist in developing anxiety-reducing skills  - Administer/offer alternative therapies  - Limit or eliminate stimulants  Outcome: Progressing

## 2024-09-06 NOTE — NURSING NOTE
Patient is visible in milieu with peers, medication compliant, cooperative and bright on approach. Patient has mild anxiety and depression, denies SI/HI and hallucinations. Patient attends group therapy. Continued care and continuous safety rounds in progress.

## 2024-09-06 NOTE — PROGRESS NOTES
Progress Note - Behavioral Health     Jolynn Guillory 63 y.o. female MRN: [id number]   Unit/Bed#: [facility] 203-01 Encounter: [id number]    Behavior over the last 24 hours: minimal improvement.     Jolynn was seen today, appears mildly anxious, depressed and states she did not sleep well last night. Continues isolated and reports she has no motivation even to take a shower. Still voices passive wish to die but denies any active plan or intent to gill herself and she is able to CFS. She has been compliant with medication and denies any current side effects . Staff report some participation in groups and on the unit.    Sleep: decreased  Appetite: fair  Medication side effects: denies  ROS: no complaints, all other systems are negative    Mental Status Evaluation:    Appearance:  age appropriate   Behavior:  cooperative, psychomotor retardation   Speech:  decreased rate   Mood:  depressed, anxious   Affect:  constricted   Thought Process:  goal directed   Associations: intact associations   Thought Content:  no overt delusions   Perceptual Disturbances: denies auditory hallucinations when asked, appears preoccupied   Risk Potential: Suicidal ideation - None at present  Homicidal ideation - None   Sensorium:  oriented to person, place, and time/date   Memory:  recent and remote memory grossly intact   Consciousness:  alert and awake   Attention: attention span and concentration are age appropriate   Insight:  limited   Judgment: fair   Gait/Station: normal gait/station   Motor Activity: no abnormal movements     Vital signs in last 24 hours:    Temp:  [97.2 °F (36.2 °C)-97.6 °F (36.4 °C)] 97.6 °F (36.4 °C)  HR:  [92-99] 97  Resp:  [18] 18  BP: (135-157)/(70-89) 135/70    Laboratory results: I have personally reviewed all pertinent laboratory/tests results.  Most Recent Labs:   Lab Results   Component Value Date    WBC 7.39 09/05/2024    RBC 4.47 09/05/2024    HGB 12.9 09/05/2024    HCT 40.0 09/05/2024     09/05/2024     RDW 12.4 09/05/2024    NEUTROABS 4.95 09/05/2024    SODIUM 136 09/05/2024    K 4.0 09/05/2024     09/05/2024    CO2 26 09/05/2024    BUN 12 09/05/2024    CREATININE 0.63 09/05/2024    GLUC 99 09/05/2024    GLUF 99 09/05/2024    CALCIUM 9.5 09/05/2024    AST 15 09/05/2024    ALT 21 09/05/2024    ALKPHOS 42 09/05/2024    TP 7.4 09/05/2024    ALB 4.3 09/05/2024    TBILI 0.65 09/05/2024    CHOLESTEROL 157 09/05/2024    HDL 46 (L) 09/05/2024    TRIG 87 09/05/2024    LDLCALC 94 09/05/2024    NONHDLC 111 09/05/2024    RCR6PPEOPQVX 4.770 (H) 09/04/2024    FREET4 0.67 08/30/2024    HGBA1C 5.8 (H) 08/30/2024     08/30/2024       Assessment & Plan   Principal Problem:    Bipolar disorder with severe depression (HCC)  Active Problems:    Insomnia    Neuropathy    Back pain    Psoriasis    Sciatica of right side    Gastroesophageal reflux disease without esophagitis    Prediabetes    Obesity (BMI 35.0-39.9 without comorbidity)    Recommended Treatment:     Planned medication and treatment changes:    All current active medications have been reviewed  Encourage group therapy, milieu therapy and occupational therapy  Behavioral Health checks every 7 minutes  Increase Trazodone to 100 mg po hs.  Wellbutrin  mg po am.    Current Facility-Administered Medications   Medication Dose Route Frequency Provider Last Rate    acetaminophen  650 mg Oral Q4H PRN Wendie Teran MD      acetaminophen  650 mg Oral Q4H PRN Wendie Teran MD      acetaminophen  975 mg Oral Q6H PRN Wendie Teran MD      aluminum-magnesium hydroxide-simethicone  30 mL Oral Q4H PRN Wendie Teran MD      fluticasone  1 spray Each Nare BID PRN Prema Barnes PA-C      gabapentin  100 mg Oral TID Jean Marie Zepeda MD      haloperidol lactate  5 mg Intramuscular Q4H PRN Max 4/day Wendie Teran MD      hydrOXYzine HCL  50 mg Oral Q6H PRN Max 4/day Jean Marie Zepeda MD      hydrOXYzine HCL  75 mg Oral Q6H PRN Max 4/day Jean Marie Zepeda MD       loratadine  10 mg Oral Daily Prema Barnes PA-C      LORazepam  0.5 mg Oral Q6H PRN Jean Marie Zepeda MD      melatonin  3 mg Oral HS Wendie Teran MD      nicotine polacrilex  4 mg Oral Q2H PRN Wendie Teran MD      propranolol  5 mg Oral Q8H PRN Wendie Teran MD      risperiDONE  0.25 mg Oral Q4H PRN Max 6/day Wendie Teran MD      risperiDONE  0.5 mg Oral Q4H PRN Max 3/day Wendie Teran MD      risperiDONE  1 mg Oral Q2H PRN Max 3/day Wendie Teran MD      traZODone  50 mg Oral Q6H PRN Max 3/day Wendie Teran MD      traZODone  50 mg Oral HS Jean Marie Zepeda MD         Risks / Benefits of Treatment:    Risks, benefits, and possible side effects of medications explained to patient and patient verbalizes understanding and agreement for treatment.    Counseling / Coordination of Care:    Patient's progress discussed with staff in treatment team meeting.  Medications, treatment progress and treatment plan reviewed with patient.  Supportive therapy provided to patient.  Group attendance encouraged.    Jean Marie Zepeda MD 09/06/24

## 2024-09-06 NOTE — PROGRESS NOTES
09/6/24     Team Meeting   Meeting Type Daily Rounds   Team Members Present   Team Members Present Physician;Occupational Therapist;Nurse;   Physician Team Member Dr Katelyn SCHNEIDER   Nursing Team Member Shannon BROWN   Social Work Team Member Abrahan CLANCY   OT Team Member Sacha CHENEY   Patient/Family Present   Patient Present No   Patient's Family Present No   201, return home when stable, no d/c date due to med adjustments, pleasant, slept, compliant

## 2024-09-06 NOTE — CASE MANAGEMENT
CM spoke with staff at [facility]: [phone] for admission notification. Patient's f/u appt. For therapy with Claudia is scheduled for Tuesday, 9/17/24 at 9 AM and Medication Management with Dr. Redmond on Friday, 10/4/24 at 11:15 AM. CM will notify the provider if rescheduling is needed due to continued stay.     CM placed phone call to office listed as patient PCP, Dr. Katerina Cooney: [phone] for admission notification. CM left VM message with phone number and request for return call to confirm practice affiliation and  for appt. Scheduling.

## 2024-09-06 NOTE — PROGRESS NOTES
"Progress Note - Jolynn Guillory 63 y.o. female MRN: [id number]    Unit/Bed#: [facility] 203-01 Encounter: [id number]        Subjective:   Patient seen and examined at bedside after reviewing the chart and discussing the case with the caring staff.      Patient examined at bedside.  Patient has no acute symptoms.    Physical Exam   Vitals: Blood pressure 135/70, pulse 97, temperature 97.6 °F (36.4 °C), temperature source Temporal, resp. rate 18, height 4' 8\" (1.422 m), weight 85.1 kg (187 lb 9.6 oz), SpO2 97%.,Body mass index is 42.06 kg/m².  Constitutional: Patient in no acute distress.  HEENT: PERR, EOMI, MMM.  Cardiovascular: Normal rate and regular rhythm.    Pulmonary/Chest: Effort normal and breath sounds normal.   Abdomen: Soft, + BS, NT.    Assessment/Plan:  Jolynn Guillory is a(n) 63 y.o. female with bipolar disorder.     GERD.  Stable.  Maalox as needed.  Prediabetes.  Hgb A1c 5.8% on 8/30/24.  Diet controlled.  DJD/OA/chronic back pain.  Tylenol as needed.  Seasonal allergies.  Start Claritin 10 mg daily and Flonase as needed 9/5.  Psoriasis.  Stable at this time.  Vitamin D deficiency.  Patient is on vitamin D3 1000 units daily.  Vitamin B12 deficiency.  Patient started vitamin B12 monthly injections.    The patient was discussed with Dr. Mora and he is in agreement with the above note.  "

## 2024-09-06 NOTE — NURSING NOTE
Patient minimally social and out in the community for short periods.  Rated depression as moderate and anxiety as moderate.  Denied any suicidal thoughts or hallucinations.  Pleasant during assessment. Compliant with medications and snack. Medication education given. Care Plan reviewed and amended. Maintained on q 7 minute checks.  Will continue to monitor.

## 2024-09-06 NOTE — NUTRITION
"   09/06/24 0759   Biochemical Data,Medical Tests, and Procedures   Biochemical Data/Medical Tests/Procedures Lab values reviewed;Meds reviewed   Labs (Comment) 9/5 CMP WNL, HDL:46, CBC WNL. 9/4 TSH:4.770   Meds (Comment) neurntin, haldol, atarax, claritin, ativan, melatonin, inderal, risperdal, desyrel   Nutrition-Focused Physical Exam   Nutrition-Focused Physical Exam Findings RN skin assessment reviewed;No skin issues documented   Medical-Related Concerns Anemia     Anxiety disorder     Bipolar disorder (HCC)     BMI 34.0-34.9,adult 03/03/2023    Psychiatric disorder   Adequacy of Intake   Nutrition Modality PO   Feeding Route   PO Independent   Current PO Intake   Current Diet Order Regular diet thin liquids   Current Meal Intake %   Estimated calorie intake compared to estimated need Nutrient needs met.   PES Statement   Problem Clinical   Weight (3) Overweight/obesity NC-3.3   Overweight/ obesity specific to Obese, Class III (5)   Related to Energy intake>energy output over time   As evidenced by: BMI   Recommendations/Interventions   Malnutrition/BMI Present Yes  (energy intake > energy output over time)   Adult BMI Classifications Morbid Obesity 40-44.9   Summary High BMI, poor PO; MST-1. Regular diet thin liquids. Meal completions 100%. Reports appetite is \"jamila\". Consumes 2 meals per day. Cooks for self. 9/4/#; 4/11/#; 10/12/#. Weight stable. Skin intact. Current diet explained.   Interventions/Recommendations Continue current diet order   Education Assessment   Education Patient/caregiver not appropriate for education at this time   Patient Nutrition Goals   Goal Avoid weight gain   Goal Status Initiated   Timeframe to complete goal by next f/u   Nutrition Complexity Risk   Nutrition complexity level Low risk   Follow up date 09/20/24       "

## 2024-09-07 PROCEDURE — 99232 SBSQ HOSP IP/OBS MODERATE 35: CPT | Performed by: NURSE PRACTITIONER

## 2024-09-07 RX ORDER — ONDANSETRON 4 MG/1
4 TABLET, ORALLY DISINTEGRATING ORAL EVERY 6 HOURS PRN
Status: DISCONTINUED | OUTPATIENT
Start: 2024-09-07 | End: 2024-09-12 | Stop reason: HOSPADM

## 2024-09-07 RX ADMIN — LORATADINE 10 MG: 10 TABLET ORAL at 08:59

## 2024-09-07 RX ADMIN — GABAPENTIN 100 MG: 100 CAPSULE ORAL at 08:59

## 2024-09-07 RX ADMIN — TRAZODONE HYDROCHLORIDE 100 MG: 100 TABLET ORAL at 21:31

## 2024-09-07 RX ADMIN — Medication 3 MG: at 21:31

## 2024-09-07 RX ADMIN — CYANOCOBALAMIN 1000 MCG: 1000 INJECTION, SOLUTION INTRAMUSCULAR; SUBCUTANEOUS at 08:59

## 2024-09-07 RX ADMIN — BUPROPION HYDROCHLORIDE 150 MG: 150 TABLET, EXTENDED RELEASE ORAL at 08:59

## 2024-09-07 RX ADMIN — GABAPENTIN 100 MG: 100 CAPSULE ORAL at 17:21

## 2024-09-07 RX ADMIN — GABAPENTIN 100 MG: 100 CAPSULE ORAL at 21:31

## 2024-09-07 RX ADMIN — Medication 1000 UNITS: at 08:59

## 2024-09-07 NOTE — NURSING NOTE
Pt is up ad jaja & gait is steady. Pt is flat & socializes minimally with other patients. Pt is cooperative & compliant with medications. Pt c/o high anxiety & high depression. Pt denies any hallucinations, suicidal or homicidal ideations. Q 7 min checks maintained to monitor pt's behavior & safety.

## 2024-09-07 NOTE — PROGRESS NOTES
"Progress Note - Jolynn Guillory 63 y.o. female MRN: [id number]    Unit/Bed#: -01 Encounter: [id number]        Subjective:   Patient seen and examined at bedside after reviewing the chart and discussing the case with the caring staff.      Patient examined at bedside.  Patient reports this morning she was experiencing nausea.  Did eat breakfast.  No vomiting, diarrhea, abdominal pain.  Does feel better now.     Physical Exam   Vitals: Blood pressure 122/71, pulse 96, temperature (!) 97.2 °F (36.2 °C), temperature source Temporal, resp. rate 18, height 4' 8\" (1.422 m), weight 84.6 kg (186 lb 6.4 oz), SpO2 95%.,Body mass index is 41.79 kg/m².  Constitutional: Patient in no acute distress.  HEENT: PERR, EOMI, MMM.  Cardiovascular: Normal rate and regular rhythm.    Pulmonary/Chest: Effort normal and breath sounds normal.   Abdomen: Soft, + BS, NT.    Assessment/Plan:  Jolynn Guillory is a(n) 63 y.o. female with bipolar disorder.     Prediabetes.  Hgb A1c 5.8% on 8/30/24.  Diet controlled.  DJD/OA/chronic back pain.  Tylenol as needed.  Seasonal allergies.  Start Claritin 10 mg daily and Flonase as needed 9/5.  Psoriasis.  Stable at this time.  Vitamin D deficiency.  Patient is on vitamin D3 1000 units daily.  Vitamin B12 deficiency.  Patient started vitamin B12 monthly injections.  GERD/nausea.  Stable.  Maalox as needed.  Add Zofran as needed 9/7.    The patient was discussed with Dr. Mora and he is in agreement with the above note.  "

## 2024-09-07 NOTE — PROGRESS NOTES
Progress Note - Behavioral Health   Jolynn Guillory 63 y.o. female MRN: [id number]  Unit/Bed#: [facility] 203-01 Encounter: [id number]    Assessment & Plan   Principal Problem:    Bipolar disorder with severe depression (HCC)  Active Problems:    Insomnia    Neuropathy    Back pain    Psoriasis    Sciatica of right side    Gastroesophageal reflux disease without esophagitis    Prediabetes    Obesity (BMI 35.0-39.9 without comorbidity)      Subjective:Patient was seen today for continuation of care, records reviewed and  patient was discussed with the morning case review team.    Patient seen today for psychiatric follow-up.  She is tearful, states she is depressed.  She reports that she is trying to keep herself distracted by coloring, reading, watching TV.  She denies suicidal thoughts today, denies any homicidal thoughts.  Denies auditory and visual hallucinations and does not appear to be responding to internal stimuli.  States she is taking her medications and denies any side effects.  She states she slept better last night, appetite is good.  She is visible in the milieu and sociable with peers.  Just received first dose of Wellbutrin XL 150mg PO this am.    Psychiatric Review of Systems:    Sleep: normal  Appetite: normal  Medication side effects: No   ROS: no complaints, all other systems are negative    Vitals:  Vitals:    09/07/24 0737   BP: 122/71   Pulse: 96   Resp: 18   Temp: (!) 97.2 °F (36.2 °C)   SpO2: 95%       Mental Status Evaluation:    Appearance:  casually dressed, wearing hospital clothes   Behavior:  cooperative, calm   Speech:  normal rate and volume   Mood:  depressed, anxious   Affect:  tearful   Thought Process:  coherent, goal directed   Associations intact associations   Thought Content:  no overt delusions   Perceptual Disturbances: denies auditory or visual hallucinations when asked, does not appear responding to internal stimuli   Risk Potential: Suicidal ideation - None at  present  Homicidal ideation - None at present  Potential for aggression - No   Sensorium:  oriented to person, place, and time/date   Memory:  recent and remote memory grossly intact   Consciousness:  alert and awake   Attention: attention span and concentration appear shorter than expected for age   Insight:  fair   Judgment: fair   Gait/Station: normal gait/station   Motor Activity: no abnormal movements     Laboratory results:  I have personally reviewed all pertinent laboratory/tests results.  Most Recent Labs:   Lab Results   Component Value Date    WBC 7.39 09/05/2024    RBC 4.47 09/05/2024    HGB 12.9 09/05/2024    HCT 40.0 09/05/2024     09/05/2024    RDW 12.4 09/05/2024    NEUTROABS 4.95 09/05/2024    SODIUM 136 09/05/2024    K 4.0 09/05/2024     09/05/2024    CO2 26 09/05/2024    BUN 12 09/05/2024    CREATININE 0.63 09/05/2024    GLUC 99 09/05/2024    GLUF 99 09/05/2024    CALCIUM 9.5 09/05/2024    AST 15 09/05/2024    ALT 21 09/05/2024    ALKPHOS 42 09/05/2024    TP 7.4 09/05/2024    ALB 4.3 09/05/2024    TBILI 0.65 09/05/2024    CHOLESTEROL 157 09/05/2024    HDL 46 (L) 09/05/2024    TRIG 87 09/05/2024    LDLCALC 94 09/05/2024    NONHDLC 111 09/05/2024    [id number] 4.770 (H) 09/04/2024    FREET4 0.67 08/30/2024    HGBA1C 5.8 (H) 08/30/2024     08/30/2024       Progress Toward Goals:     Patient is progressing towards goals of inpatient psychiatric treatment by continued medication compliance and is attending therapeutic modalities on the milieu. However, the patient continues to require inpatient psychiatric hospitalization for continued medication management and titration to optimize symptom reduction, improve sleep hygiene, and demonstrate adequate self-care. Jolynn did sleep better last night and denies SI.  D/C date is pending.      Recommended Treatment:     All current active medications have been reviewed  Encourage group therapy, milieu therapy and occupational  therapy  Behavioral Health checks every 7 minutes  Discharge planning remains ongoing  Safety checks and vitals per unit protocol  Continue with medical management as indicated  Will review labs in the morning      Continue current medications:  Current Facility-Administered Medications   Medication Dose Route Frequency Provider Last Rate    acetaminophen  650 mg Oral Q4H PRN Wendie Teran MD      acetaminophen  650 mg Oral Q4H PRN Wendie Teran MD      acetaminophen  975 mg Oral Q6H PRN Wendie Teran MD      aluminum-magnesium hydroxide-simethicone  30 mL Oral Q4H PRN Wendie Teran MD      buPROPion  150 mg Oral Daily Jean Marie Zepeda MD      Cholecalciferol  1,000 Units Oral Daily Prema Barnes PA-C      cyanocobalamin  1,000 mcg Intramuscular Q30 Days Prema Barnes PA-C      fluticasone  1 spray Each Nare BID PRN Prema Barnes PA-C      gabapentin  100 mg Oral TID Jean Marie Zepeda MD      haloperidol lactate  5 mg Intramuscular Q4H PRN Max 4/day Wendie Teran MD      hydrOXYzine HCL  50 mg Oral Q6H PRN Max 4/day Jean Marie Zepeda MD      hydrOXYzine HCL  75 mg Oral Q6H PRN Max 4/day Jean Marie Zepeda MD      loratadine  10 mg Oral Daily Prema Barnes PA-C      LORazepam  0.5 mg Oral Q6H PRN Jean Marie Zepeda MD      melatonin  3 mg Oral HS Wendie Teran MD      nicotine polacrilex  4 mg Oral Q2H PRN Wendie Teran MD      propranolol  5 mg Oral Q8H PRN Wendie Teran MD      risperiDONE  0.25 mg Oral Q4H PRN Max 6/day Wendie Teran MD      risperiDONE  0.5 mg Oral Q4H PRN Max 3/day Wendie Teran MD      risperiDONE  1 mg Oral Q2H PRN Max 3/day Wendie Teran MD      traZODone  100 mg Oral HS Jean Marie Zepeda MD      traZODone  50 mg Oral Q6H PRN Max 3/day Wendie Teran MD         Risks / Benefits of Treatment:     Risks, benefits, and possible side effects of medications explained to patient and patient verbalizes understanding and agreement for treatment.    Counseling /  Coordination of Care:     Total floor / unit time spent today 20 minutes. Greater than 50% of total time was spent with the patient and / or family counseling and / or coordination of care. A description of counseling / coordination of care:  Patient's progress reviewed with nursing staff.  Medications, treatment progress and treatment plan reviewed with patient.  Supportive counseling provided to the patient.    WYATT Carey

## 2024-09-07 NOTE — NURSING NOTE
Controlled and visible in the community. Social with select peers. Positive for medications and snack.  Denies any suicidal or homicidal ideations. Rated anxiety and depression as moderate.  No behavioral issues.  No change in medical condition or complaints voiced.  Maintained on q 7 minute checks.  No aspiration risks noted.  Medication education given.  Care Plan reviewed and amended.  Will continue to monitor.

## 2024-09-07 NOTE — PLAN OF CARE
Problem: DISCHARGE PLANNING  Goal: Discharge to home or other facility with appropriate resources  Description: INTERVENTIONS:  - Identify barriers to discharge w/patient and caregiver  - Arrange for needed discharge resources and transportation as appropriate  - Identify discharge learning needs (meds, wound care, etc.)  - Arrange for interpretive services to assist at discharge as needed  - Refer to Case Management Department for coordinating discharge planning if the patient needs post-hospital services based on physician/advanced practitioner order or complex needs related to functional status, cognitive ability, or social support system  Outcome: Progressing     Problem: Risk for Self Injury/Neglect  Goal: Treatment Goal: Remain safe during length of stay, learn and adopt new coping skills, and be free of self-injurious ideation, impulses and acts at the time of discharge  Outcome: Progressing  Goal: Attend and participate in unit activities, including therapeutic, recreational, and educational groups  Description: Interventions:  - Provide therapeutic and educational activities daily, encourage attendance and participation, and document same in the medical record  - Obtain collateral information, encourage visitation and family involvement in care   Outcome: Progressing  Goal: Recognize maladaptive responses and adopt new coping mechanisms  Outcome: Progressing  Goal: Complete daily ADLs, including personal hygiene independently, as able  Description: Interventions:  - Observe, teach, and assist patient with ADLS  - Monitor and promote a balance of rest/activity, with adequate nutrition and elimination  Outcome: Progressing     Problem: Depression  Goal: Treatment Goal: Demonstrate behavioral control of depressive symptoms, verbalize feelings of improved mood/affect, and adopt new coping skills prior to discharge  Outcome: Progressing  Goal: Verbalize thoughts and feelings  Description: Interventions:  -  Assess and re-assess patient's level of risk   - Engage patient in 1:1 interactions, daily, for a minimum of 15 minutes   - Encourage patient to express feelings, fears, frustrations, hopes   Outcome: Progressing  Goal: Refrain from harming self  Description: Interventions:  - Monitor patient closely, per order   - Supervise medication ingestion, monitor effects and side effects   Outcome: Progressing  Goal: Refrain from isolation  Description: Interventions:  - Develop a trusting relationship   - Encourage socialization   Outcome: Progressing  Goal: Refrain from self-neglect  Outcome: Progressing     Problem: Anxiety  Goal: Anxiety is at manageable level  Description: Interventions:  - Assess and monitor patient's anxiety level.   - Monitor for signs and symptoms (heart palpitations, chest pain, shortness of breath, headaches, nausea, feeling jumpy, restlessness, irritable, apprehensive).   - Collaborate with interdisciplinary team and initiate plan and interventions as ordered.  - [place] [employer] patient to unit/surroundings  - Explain treatment plan  - Encourage participation in care  - Encourage verbalization of concerns/fears  - Identify coping mechanisms  - Assist in developing anxiety-reducing skills  - Administer/offer alternative therapies  - Limit or eliminate stimulants  Outcome: Progressing     Problem: Nutrition/Hydration-ADULT  Goal: Nutrient/Hydration intake appropriate for improving, restoring or maintaining nutritional needs  Description: Monitor and assess patient's nutrition/hydration status for malnutrition. Collaborate with interdisciplinary team and initiate plan and interventions as ordered.  Monitor patient's weight and dietary intake as ordered or per policy. Utilize nutrition screening tool and intervene as necessary. Determine patient's food preferences and provide high-protein, high-caloric foods as appropriate.     INTERVENTIONS:  - Monitor oral intake, urinary output, labs, and treatment  plans  - Assess nutrition and hydration status and recommend course of action  - Evaluate amount of meals eaten  - Assist patient with eating if necessary   - Allow adequate time for meals  - Recommend/ encourage appropriate diets, oral nutritional supplements, and vitamin/mineral supplements  - Order, calculate, and assess calorie counts as needed  - Recommend, monitor, and adjust tube feedings and TPN/PPN based on assessed needs  - Assess need for intravenous fluids  - Provide specific nutrition/hydration education as appropriate  - Include patient/family/caregiver in decisions related to nutrition  Outcome: Progressing

## 2024-09-08 PROCEDURE — 99232 SBSQ HOSP IP/OBS MODERATE 35: CPT | Performed by: NURSE PRACTITIONER

## 2024-09-08 RX ORDER — LIDOCAINE 50 MG/G
1 PATCH TOPICAL DAILY
Status: DISCONTINUED | OUTPATIENT
Start: 2024-09-08 | End: 2024-09-12 | Stop reason: HOSPADM

## 2024-09-08 RX ADMIN — Medication 1000 UNITS: at 08:44

## 2024-09-08 RX ADMIN — LORATADINE 10 MG: 10 TABLET ORAL at 08:44

## 2024-09-08 RX ADMIN — Medication 3 MG: at 21:25

## 2024-09-08 RX ADMIN — LIDOCAINE 1 PATCH: 50 PATCH CUTANEOUS at 17:26

## 2024-09-08 RX ADMIN — TRAZODONE HYDROCHLORIDE 100 MG: 100 TABLET ORAL at 21:25

## 2024-09-08 RX ADMIN — GABAPENTIN 100 MG: 100 CAPSULE ORAL at 21:25

## 2024-09-08 RX ADMIN — BUPROPION HYDROCHLORIDE 150 MG: 150 TABLET, EXTENDED RELEASE ORAL at 08:44

## 2024-09-08 RX ADMIN — GABAPENTIN 100 MG: 100 CAPSULE ORAL at 08:44

## 2024-09-08 RX ADMIN — GABAPENTIN 100 MG: 100 CAPSULE ORAL at 17:25

## 2024-09-08 NOTE — NURSING NOTE
Patient is visible in milieu with peers, medication compliant and cooperative. Patient has moderate anxiety and mild depression, denies SI/HI and hallucinations. Continued care and continual safety rounds in progress.

## 2024-09-08 NOTE — NURSING NOTE
Patient visible on the unit. Calm and cooperative. Endorses moderate depression, tearful at times. Denies SI, HI, A/V/H. Mild anxiety. Compliant with medications. Will continue to monitor and access. Continuous rounding maintained.

## 2024-09-08 NOTE — PROGRESS NOTES
"Progress Note - Jolynn Guillory 63 y.o. female MRN: [id number]    Unit/Bed#: -01 Encounter: [id number]        Subjective:   Patient seen and examined at bedside after reviewing the chart and discussing the case with the caring staff.      Patient examined at bedside.  Patient complaining of chronic right shoulder pain.    Physical Exam   Vitals: Blood pressure 145/73, pulse 87, temperature 97.9 °F (36.6 °C), temperature source Temporal, resp. rate 17, height 4' 8\" (1.422 m), weight 84.6 kg (186 lb 6.4 oz), SpO2 98%.,Body mass index is 41.79 kg/m².  Constitutional: Patient in no acute distress.  HEENT: PERR, EOMI, MMM.  Cardiovascular: Normal rate and regular rhythm.    Pulmonary/Chest: Effort normal and breath sounds normal.   Abdomen: Soft, + BS, NT.    Assessment/Plan:  Jolynn Guillory is a(n) 63 y.o. female with bipolar disorder.     Prediabetes.  Hgb A1c 5.8% on 8/30/24.  Diet controlled.  DJD/OA/chronic back and right shoulder pain.  Tylenol as needed.  Add lidocaine patch daily to right shoulder 9/8.  Seasonal allergies.  Start Claritin 10 mg daily and Flonase as needed 9/5.  Psoriasis.  Stable at this time.  Vitamin D deficiency.  Patient is on vitamin D3 1000 units daily.  Vitamin B12 deficiency.  Patient started vitamin B12 monthly injections.  GERD/nausea.  Stable.  Maalox as needed.  Add Zofran as needed 9/7.    The patient was discussed with Dr. Mora and he is in agreement with the above note.  "

## 2024-09-08 NOTE — PROGRESS NOTES
Progress Note - Behavioral Health   Jolynn Guillory 63 y.o. female MRN: [id number]  Unit/Bed#: [facility]-01 Encounter: [id number]    Assessment & Plan   Principal Problem:    Bipolar disorder with severe depression (HCC)  Active Problems:    Insomnia    Neuropathy    Back pain    Psoriasis    Sciatica of right side    Gastroesophageal reflux disease without esophagitis    Prediabetes    Obesity (BMI 35.0-39.9 without comorbidity)      Subjective:Patient was seen today for continuation of care, records reviewed and  patient was discussed with the morning case review team.      Patient seen today for psychiatric follow up. She continues to endorse depression but states that it is decreased and believes the medication has been somewhat effective.  She is calm and appropriate in conversation. She denies SI/HI. Denies auditory and visual hallucinations and does not appear to be responding to any internal stimuli. She is visual in the milieu and is social with peers. She is coloring and remaining busy thoughout the morning.  She is compliant with medication and denies any side effects. She reports good sleep and good appetite at this time.    Psychiatric Review of Systems:    Sleep: normal  Appetite: normal  Medication side effects: No   ROS: no complaints, all other systems are negative    Vitals:  Vitals:    09/08/24 0708   BP: 145/73   Pulse: 87   Resp: 17   Temp: 97.9 °F (36.6 °C)   SpO2: 98%       Mental Status Evaluation:    Appearance:  dressed appropriately, dressed in hospital attire   Behavior:  cooperative, calm   Speech:  normal rate and volume   Mood:  depressed   Affect:  mood-congruent   Thought Process:  coherent, goal directed   Associations intact associations   Thought Content:  no overt delusions   Perceptual Disturbances: denies auditory or visual hallucinations when asked, does not appear responding to internal stimuli   Risk Potential: Suicidal ideation - None at present  Homicidal ideation - None  at present  Potential for aggression - No   Sensorium:  oriented to person, place, and time/date   Memory:  recent and remote memory grossly intact   Consciousness:  alert and awake   Attention: attention span and concentration are age appropriate   Insight:  age appropriate   Judgment: age appropriate   Gait/Station: normal gait/station   Motor Activity: no abnormal movements     Laboratory results:  I have personally reviewed all pertinent laboratory/tests results.    Progress Toward Goals:     Patient is progressing towards goals of inpatient psychiatric treatment by continued medication compliance and is attending therapeutic modalities on the milieu. However, the patient continues to require inpatient psychiatric hospitalization for continued medication management and titration to optimize symptom reduction, improve sleep hygiene, and demonstrate adequate self-care. D/C date pending, reports improved mood but continued depression.      Recommended Treatment:     All current active medications have been reviewed  Encourage group therapy, milieu therapy and occupational therapy  Behavioral Health checks every 7 minutes  Discharge planning remains ongoing  Safety checks and vitals per unit protocol  Continue with medical management as indicated  Will review labs in the morning    Continue current medications:  Current Facility-Administered Medications   Medication Dose Route Frequency Provider Last Rate    acetaminophen  650 mg Oral Q4H PRN Wendie Teran MD      acetaminophen  650 mg Oral Q4H PRN Wendie Teran MD      acetaminophen  975 mg Oral Q6H PRN Wendie Teran MD      aluminum-magnesium hydroxide-simethicone  30 mL Oral Q4H PRN Wendie Teran MD      buPROPion  150 mg Oral Daily Jean Marie Zepeda MD      Cholecalciferol  1,000 Units Oral Daily Prema Barnes PA-C      cyanocobalamin  1,000 mcg Intramuscular Q30 Days Prema Barnes PA-C      fluticasone  1 spray Each Nare BID PRN Prema GAUTAM  KHADIJAH Barnes      gabapentin  100 mg Oral TID Jaen Marie Zepeda MD      haloperidol lactate  5 mg Intramuscular Q4H PRN Max 4/day Wendie Teran MD      hydrOXYzine HCL  50 mg Oral Q6H PRN Max 4/day Jean Marie Zepeda MD      hydrOXYzine HCL  75 mg Oral Q6H PRN Max 4/day Jean Marie Zepeda MD      loratadine  10 mg Oral Daily Prema Barnes PA-C      LORazepam  0.5 mg Oral Q6H PRN Jean Marie Zepeda MD      melatonin  3 mg Oral HS Wendie Teran MD      nicotine polacrilex  4 mg Oral Q2H PRN Wendie Teran MD      ondansetron  4 mg Oral Q6H PRN Prema Barnes PA-C      propranolol  5 mg Oral Q8H PRN Wendie Teran MD      risperiDONE  0.25 mg Oral Q4H PRN Max 6/day Wendie Teran MD      risperiDONE  0.5 mg Oral Q4H PRN Max 3/day Wendie Teran MD      risperiDONE  1 mg Oral Q2H PRN Max 3/day Wendie Teran MD      traZODone  100 mg Oral HS Jean Marie Zepeda MD      traZODone  50 mg Oral Q6H PRN Max 3/day Wendie Teran MD         Risks / Benefits of Treatment:     Risks, benefits, and possible side effects of medications explained to patient and patient verbalizes understanding and agreement for treatment.    Counseling / Coordination of Care:     Total floor / unit time spent today 20 minutes. Greater than 50% of total time was spent with the patient and / or family counseling and / or coordination of care. A description of counseling / coordination of care:  Patient's progress reviewed with nursing staff.  Medications, treatment progress and treatment plan reviewed with patient.  Supportive counseling provided to the patient.    WYATT Carey

## 2024-09-09 PROCEDURE — 99232 SBSQ HOSP IP/OBS MODERATE 35: CPT | Performed by: PSYCHIATRY & NEUROLOGY

## 2024-09-09 RX ADMIN — TRAZODONE HYDROCHLORIDE 100 MG: 100 TABLET ORAL at 21:46

## 2024-09-09 RX ADMIN — GABAPENTIN 100 MG: 100 CAPSULE ORAL at 08:31

## 2024-09-09 RX ADMIN — LIDOCAINE 1 PATCH: 50 PATCH CUTANEOUS at 08:32

## 2024-09-09 RX ADMIN — Medication 3 MG: at 21:46

## 2024-09-09 RX ADMIN — BUPROPION HYDROCHLORIDE 150 MG: 150 TABLET, EXTENDED RELEASE ORAL at 08:31

## 2024-09-09 RX ADMIN — LORATADINE 10 MG: 10 TABLET ORAL at 08:31

## 2024-09-09 RX ADMIN — GABAPENTIN 100 MG: 100 CAPSULE ORAL at 21:46

## 2024-09-09 RX ADMIN — GABAPENTIN 100 MG: 100 CAPSULE ORAL at 15:31

## 2024-09-09 RX ADMIN — Medication 1000 UNITS: at 08:31

## 2024-09-09 NOTE — PLAN OF CARE
Problem: Ineffective Coping  Goal: Participates in unit activities  Description: Interventions:  - Provide therapeutic environment   - Provide required programming   - Redirect inappropriate behaviors   Outcome: Progressing     Problem: Risk for Self Injury/Neglect  Goal: Attend and participate in unit activities, including therapeutic, recreational, and educational groups  Description: Interventions:  - Provide therapeutic and educational activities daily, encourage attendance and participation, and document same in the medical record  - Obtain collateral information, encourage visitation and family involvement in care   Outcome: Progressing   Patient joins groups and openly shares with staff and peers.

## 2024-09-09 NOTE — NURSING NOTE
Patient has been visible in the milieu this morning. She is bright and pleasant. Social with staff and peers. She is compliant with her scheduled medications. Her appetite is good. She endorses mild anxiety and depression. Denies Si/Hi and hallucinations. She is up and ambulatory without difficulty. She is able to make her needs known and offers no current complaints/ concerns. Plan of care continues.

## 2024-09-09 NOTE — PROGRESS NOTES
"Progress Note - Jolynn Guillory 63 y.o. female MRN: [id number]    Unit/Bed#: -01 Encounter: [id number]        Subjective:   Patient seen and examined at bedside after reviewing the chart and discussing the case with the caring staff.      Patient examined at bedside.  Patient reports no acute symptoms.    Physical Exam   Vitals: Blood pressure 132/71, pulse 88, temperature 97.5 °F (36.4 °C), temperature source Temporal, resp. rate 18, height 4' 8\" (1.422 m), weight 84.6 kg (186 lb 6.4 oz), SpO2 95%.,Body mass index is 41.79 kg/m².  Constitutional: Patient in no acute distress.  HEENT: PERR, EOMI, MMM.  Cardiovascular: Normal rate and regular rhythm.    Pulmonary/Chest: Effort normal and breath sounds normal.   Abdomen: Soft, + BS, NT.    Assessment/Plan:  Jolynn Guillory is a(n) 63 y.o. female with bipolar disorder.     Prediabetes.  Hgb A1c 5.8% on 8/30/24.  Diet controlled.  DJD/OA/chronic back and right shoulder pain.  Tylenol as needed.  Add lidocaine patch daily to right shoulder 9/8.  Seasonal allergies.  Start Claritin 10 mg daily and Flonase as needed 9/5.  Psoriasis.  Stable at this time.  Vitamin D deficiency.  Patient is on vitamin D3 1000 units daily.  Vitamin B12 deficiency.  Patient started vitamin B12 monthly injections.  GERD/nausea.  Stable.  Maalox as needed.  Add Zofran as needed 9/7  "

## 2024-09-09 NOTE — PROGRESS NOTES
Progress Note - Behavioral Health     Jolynn Guillory 63 y.o. female MRN: [id number]   Unit/Bed#: [facility] 203-01 Encounter: [id number]    Behavior over the last 24 hours: some improvement.     Jolynn was seen for continuing care. She is less anxious but becomes tearful when talking about home and her mental health. She states she slept well last night. and is interested in seeing a therapist and wants to continue to color and journal because it seems to help her. She spent this morning outside her room coloring and participating in group. Did not voice a wish to die  and denies any active plan or intent to hurt herself and she is able to CFS. She has been compliant with medication and denies any current side effects. Staff report some participation in groups and on the unit.    Sleep: improved  Appetite: normal  Medication side effects: denies  ROS: no complaints, all other systems are negative    Mental Status Evaluation:    Appearance:  age appropriate   Behavior:  cooperative, psychomotor retardation   Speech:  decreased rate, repetitive   Mood:  depressed, anxious   Affect:  constricted   Thought Process:  goal directed   Associations: intact associations   Thought Content:  no overt delusions   Perceptual Disturbances: denies auditory hallucinations when asked   Risk Potential: Suicidal ideation - None at present  Homicidal ideation - None   Sensorium:  oriented to person, place, and time/date   Memory:  recent and remote memory grossly intact   Consciousness:  alert and awake   Attention: attention span and concentration are age appropriate   Insight:  limited   Judgment: fair   Gait/Station: normal gait/station   Motor Activity: no abnormal movements     Vital signs in last 24 hours:    Temp:  [97.5 °F (36.4 °C)-97.6 °F (36.4 °C)] 97.5 °F (36.4 °C)  HR:  [88-96] 88  Resp:  [16-18] 18  BP: (127-133)/(61-72) 132/71    Laboratory results: I have personally reviewed all pertinent laboratory/tests results.  Most  Recent Labs:   Lab Results   Component Value Date    WBC 7.39 09/05/2024    RBC 4.47 09/05/2024    HGB 12.9 09/05/2024    HCT 40.0 09/05/2024     09/05/2024    RDW 12.4 09/05/2024    NEUTROABS 4.95 09/05/2024    SODIUM 136 09/05/2024    K 4.0 09/05/2024     09/05/2024    CO2 26 09/05/2024    BUN 12 09/05/2024    CREATININE 0.63 09/05/2024    GLUC 99 09/05/2024    GLUF 99 09/05/2024    CALCIUM 9.5 09/05/2024    AST 15 09/05/2024    ALT 21 09/05/2024    ALKPHOS 42 09/05/2024    TP 7.4 09/05/2024    ALB 4.3 09/05/2024    TBILI 0.65 09/05/2024    CHOLESTEROL 157 09/05/2024    HDL 46 (L) 09/05/2024    TRIG 87 09/05/2024    LDLCALC 94 09/05/2024    NONHDLC 111 09/05/2024    DTR5CKTNLZID 4.770 (H) 09/04/2024    FREET4 0.67 08/30/2024    HGBA1C 5.8 (H) 08/30/2024     08/30/2024       Assessment & Plan   Principal Problem:    Bipolar disorder with severe depression (HCC)  Active Problems:    Insomnia    Neuropathy    Back pain    Psoriasis    Sciatica of right side    Gastroesophageal reflux disease without esophagitis    Prediabetes    Obesity (BMI 35.0-39.9 without comorbidity)    Recommended Treatment:     Planned medication and treatment changes:    All current active medications have been reviewed  Encourage group therapy, milieu therapy and occupational therapy  Behavioral Health checks every 7 minutes  Possible discharge in a few days if continues to improve  Ct with current meds and supports.    Current Facility-Administered Medications   Medication Dose Route Frequency Provider Last Rate    acetaminophen  650 mg Oral Q4H PRN Wendie Teran MD      acetaminophen  650 mg Oral Q4H PRN Wendie Teran MD      acetaminophen  975 mg Oral Q6H PRN Wendie Teran MD      aluminum-magnesium hydroxide-simethicone  30 mL Oral Q4H PRN Wendie Teran MD      buPROPion  150 mg Oral Daily Aj H Katelyn, MD      Cholecalciferol  1,000 Units Oral Daily Prema Barnes PA-C      cyanocobalamin  1,000  mcg Intramuscular Q30 Days Prema Huntl, PA-C      fluticasone  1 spray Each Nare BID PRN Prema GAUTAM Cameron, PA-C      gabapentin  100 mg Oral TID Jean Marie Zepeda MD      haloperidol lactate  5 mg Intramuscular Q4H PRN Max 4/day Wendie Teran MD      hydrOXYzine HCL  50 mg Oral Q6H PRN Max 4/day Jean Marie Zepeda MD      hydrOXYzine HCL  75 mg Oral Q6H PRN Max 4/day Jean Marie Zepeda MD      lidocaine  1 patch Topical Daily Prema Huntl, PA-C      loratadine  10 mg Oral Daily Prema DAIN Huntl, PA-C      LORazepam  0.5 mg Oral Q6H PRN Jean Marie Zepeda MD      melatonin  3 mg Oral HS Wendie Teran MD      nicotine polacrilex  4 mg Oral Q2H PRN Wendie Teran MD      ondansetron  4 mg Oral Q6H PRN Prema Barnes, PA-C      propranolol  5 mg Oral Q8H PRN Wendie Teran MD      risperiDONE  0.25 mg Oral Q4H PRN Max 6/day Wendie Teran MD      risperiDONE  0.5 mg Oral Q4H PRN Max 3/day Wendie Teran MD      risperiDONE  1 mg Oral Q2H PRN Max 3/day Wendie Teran MD      traZODone  100 mg Oral HS Jean Marie Zepeda MD      traZODone  50 mg Oral Q6H PRN Max 3/day Wendie Teran MD         Risks / Benefits of Treatment:    Risks, benefits, and possible side effects of medications explained to patient and patient verbalizes understanding and agreement for treatment.    Counseling / Coordination of Care:    Patient's progress discussed with staff in treatment team meeting.  Medications, treatment progress and treatment plan reviewed with patient.  Supportive therapy provided to patient.  Group attendance encouraged.    Jean Marie Zepeda MD 09/09/24

## 2024-09-09 NOTE — PLAN OF CARE
Problem: Risk for Self Injury/Neglect  Goal: Treatment Goal: Remain safe during length of stay, learn and adopt new coping skills, and be free of self-injurious ideation, impulses and acts at the time of discharge  Outcome: Progressing     Problem: Depression  Goal: Refrain from harming self  Description: Interventions:  - Monitor patient closely, per order   - Supervise medication ingestion, monitor effects and side effects   Outcome: Progressing     Problem: Anxiety  Goal: Anxiety is at manageable level  Description: Interventions:  - Assess and monitor patient's anxiety level.   - Monitor for signs and symptoms (heart palpitations, chest pain, shortness of breath, headaches, nausea, feeling jumpy, restlessness, irritable, apprehensive).   - Collaborate with interdisciplinary team and initiate plan and interventions as ordered.  - [place] patient to unit/surroundings  - Explain treatment plan  - Encourage participation in care  - Encourage verbalization of concerns/fears  - Identify coping mechanisms  - Assist in developing anxiety-reducing skills  - Administer/offer alternative therapies  - Limit or eliminate stimulants  Outcome: Progressing

## 2024-09-09 NOTE — NURSING NOTE
Presents with constricted to blunted affect in lieu of endorsing only mild depression and anxiety:denies SI,HI,AH,VH.She is visible,makes needs known,is compliant with medications,unit rules,appetite is adequate,is independent with ambulation and ADLs. We discussed way to increase coping skills.Will continue to educate,monitor,and provide safe,therapeutic milieu.

## 2024-09-09 NOTE — NURSING NOTE
Patient visible on the unit. Pleasant and cooperative, appears brighter today. Compliant with medications. Denies SI, HI, A/V/H. Anxiety and depression improved today. Continuous rounding maintained.

## 2024-09-10 PROCEDURE — 99232 SBSQ HOSP IP/OBS MODERATE 35: CPT | Performed by: PSYCHIATRY & NEUROLOGY

## 2024-09-10 RX ORDER — GABAPENTIN 100 MG/1
200 CAPSULE ORAL 3 TIMES DAILY
Status: DISCONTINUED | OUTPATIENT
Start: 2024-09-10 | End: 2024-09-12 | Stop reason: HOSPADM

## 2024-09-10 RX ADMIN — BUPROPION HYDROCHLORIDE 150 MG: 150 TABLET, EXTENDED RELEASE ORAL at 08:28

## 2024-09-10 RX ADMIN — GABAPENTIN 200 MG: 100 CAPSULE ORAL at 22:10

## 2024-09-10 RX ADMIN — LORATADINE 10 MG: 10 TABLET ORAL at 08:28

## 2024-09-10 RX ADMIN — GABAPENTIN 100 MG: 100 CAPSULE ORAL at 08:28

## 2024-09-10 RX ADMIN — Medication 3 MG: at 22:10

## 2024-09-10 RX ADMIN — GABAPENTIN 200 MG: 100 CAPSULE ORAL at 17:16

## 2024-09-10 RX ADMIN — Medication 1000 UNITS: at 08:27

## 2024-09-10 RX ADMIN — LIDOCAINE 1 PATCH: 50 PATCH CUTANEOUS at 08:29

## 2024-09-10 RX ADMIN — TRAZODONE HYDROCHLORIDE 100 MG: 100 TABLET ORAL at 22:10

## 2024-09-10 NOTE — PROGRESS NOTES
09/10/24 1212   Activity/Group Checklist   Group Admission/Discharge   Attendance Attended   Attendance Duration (min) 0-15   Interactions Interacted appropriately   Affect/Mood Appropriate   Goals Achieved Identified feelings;Identified triggers;Identified relapse prevention strategies;Discussed coping strategies;Discussed discharge plans;Identified resources and support systems;Able to listen to others;Able to engage in interactions;Able to reflect/comment on own behavior;Able to manage/cope with feelings;Able to self-disclose;Able to recieve feedback;Able to experience relief/decrease in symptoms     Patient was agreeable to meet and complete relapse prevention plan with [facility].  Patient information from forms can be found in media.

## 2024-09-10 NOTE — DISCHARGE INSTR - OTHER ORDERS
You are being discharged to your home at [facility] [facility] [address] TELEPHONE [phone]     Triggers you have identified during your hospitalization that led to your admission of a regressed mood include ineffective coping skills and an increase in depression. Coping skills you have identified during your hospitalization include listening to music and spending time with your family. If you are unable to deal with your distressed mood alone please contact your  Rosa Pelayo at [phone] or your therapist at [facility] at [phone]. If that is not effective and you continue to have (ex: suicidal ideation, homicidal ideation, distressed mood, overwhelmed, in crisis) please contact Crisis by dialing 139, [facility] Crisis Hotline: 337.216.5956, dial 013 or go to the nearest emergency center.      *[facility] Crisis Hotline: 857.974.6262  *[place] Drug and Alcohol Commission: 335.971.4014   *[place] Alcohol Anonymous: 263.177.6793  *National Suicide Prevention Lifeline:  1-315.530.9286  *National [place] on Mental Illness (KIT) HELPLINE: 271.359.4386/Website: www.kit.org  *Substance Abuse and Mental Health Services Administration([facility]) National Helpline, which is a confidential, free, 24-hour-a-day, 365-day-a-year, information service for individuals and family members facing mental health and/or substance use disorders. This service provides referrals to local treatment facilities, support groups, and community-based organizations. Callers can also order free publications and other information.  Call 1-243.316.5340/Website: www.[facility].gov  *[employer] 2-1-1: This is a toll free, confidential, 24-hour-a-day service which connects you to a community  in your area who can help you find services and resources that are available to you locally and provide critical services that can improve and save lives.  Call: 211  /Website:  http://www.[facility].org/       Jazmyn, or Estella, our Behavioral Health Nurse Navigators, will be calling you after your discharge, on the phone number that you provided.  They will be available as an additional support, if needed.   If you wish to speak with one of them, you may contact Jazmyn at [phone] or Estella at [phone].

## 2024-09-10 NOTE — PROGRESS NOTES
09/10/24     Team Meeting   Meeting Type Daily Rounds   Team Members Present   Team Members Present Physician;Occupational Therapist;Nurse;   Physician Team Member Dr Katelyn SCHNEIDER   Nursing Team Member Shannon BROWN   Social Work Team Member Abrahan CLANCY   OT Team Member Sacha MOORES   Patient/Family Present   Patient Present No   Patient's Family Present No   201, pleasant, med compliant, d/c home Thurs with [facility], slept

## 2024-09-10 NOTE — SOCIAL WORK
CHLOE spoke to pt  Rosa with [facility] -[phone]. Cm updated on pt progress and reviewed scheduled d/c. She will meet with pt at pt home on Fri. As per Rosa, pt spouse or daughter can provide transport home.

## 2024-09-10 NOTE — PROGRESS NOTES
Progress Note - Behavioral Health     Jolynn Guillory 63 y.o. female MRN: [id number]   Unit/Bed#: [facility] 203-01 Encounter: [id number]    Behavior over the last 24 hours: slowly improving.     Jolynn was seen for continuing care. She appears mildly anxious but states she has been making gradual improvement with improved appetite and sleep.  She continues somewhat isolated but states she is trying to participate in the groups.  Denies current SI or wish to die consistently and she is able to CFS. She has been compliant with medication and denies any current side effects . Staff report some participation in groups and on the unit.    Sleep: slept better  Appetite: fair  Medication side effects: denies  ROS: no complaints, all other systems are negative    Mental Status Evaluation:    Appearance:  age appropriate, dressed appropriately   Behavior:  cooperative, slow responses   Speech:  decreased rate   Mood:  anxious   Affect:  constricted   Thought Process:  goal directed   Associations: intact associations   Thought Content:  no overt delusions   Perceptual Disturbances: denies auditory hallucinations when asked, appears preoccupied   Risk Potential: Suicidal ideation - None at present  Homicidal ideation - None   Sensorium:  oriented to person, place, and time/date   Memory:  recent and remote memory grossly intact   Consciousness:  alert and awake   Attention: attention span and concentration are age appropriate   Insight:  limited   Judgment: fair   Gait/Station: normal gait/station   Motor Activity: no abnormal movements     Vital signs in last 24 hours:    Temp:  [97.6 °F (36.4 °C)-97.7 °F (36.5 °C)] 97.7 °F (36.5 °C)  HR:  [] 91  Resp:  [18] 18  BP: (126-135)/(63-70) 126/70    Laboratory results: I have personally reviewed all pertinent laboratory/tests results.  Most Recent Labs:   Lab Results   Component Value Date    WBC 7.39 09/05/2024    RBC 4.47 09/05/2024    HGB 12.9 09/05/2024    HCT 40.0 09/05/2024      09/05/2024    RDW 12.4 09/05/2024    NEUTROABS 4.95 09/05/2024    SODIUM 136 09/05/2024    K 4.0 09/05/2024     09/05/2024    CO2 26 09/05/2024    BUN 12 09/05/2024    CREATININE 0.63 09/05/2024    GLUC 99 09/05/2024    GLUF 99 09/05/2024    CALCIUM 9.5 09/05/2024    AST 15 09/05/2024    ALT 21 09/05/2024    ALKPHOS 42 09/05/2024    TP 7.4 09/05/2024    ALB 4.3 09/05/2024    TBILI 0.65 09/05/2024    CHOLESTEROL 157 09/05/2024    HDL 46 (L) 09/05/2024    TRIG 87 09/05/2024    LDLCALC 94 09/05/2024    NONHDLC 111 09/05/2024    ZVJ2FSVMPSAP 4.770 (H) 09/04/2024    FREET4 0.67 08/30/2024    HGBA1C 5.8 (H) 08/30/2024     08/30/2024       Assessment & Plan   Principal Problem:    Bipolar disorder with severe depression (HCC)  Active Problems:    Insomnia    Neuropathy    Back pain    Psoriasis    Sciatica of right side    Gastroesophageal reflux disease without esophagitis    Prediabetes    Obesity (BMI 35.0-39.9 without comorbidity)    Recommended Treatment:     Planned medication and treatment changes:    All current active medications have been reviewed  Encourage group therapy, milieu therapy and occupational therapy  Behavioral Health checks every 7 minutes  Increase Neurontin to 200 mg po tid    Current Facility-Administered Medications   Medication Dose Route Frequency Provider Last Rate    acetaminophen  650 mg Oral Q4H PRN Wendie Teran MD      acetaminophen  650 mg Oral Q4H PRN Wendie Teran MD      acetaminophen  975 mg Oral Q6H PRN Wendie Teran MD      aluminum-magnesium hydroxide-simethicone  30 mL Oral Q4H PRN Wendie Teran MD      buPROPion  150 mg Oral Daily Jean Marie Zepeda MD      Cholecalciferol  1,000 Units Oral Daily Prema L Dagnall, PA-C      cyanocobalamin  1,000 mcg Intramuscular Q30 Days Prema L Dagnall PA-C      fluticasone  1 spray Each Nare BID PRN Prema Barnes PA-C      gabapentin  100 mg Oral TID Jean Marie Zepeda MD      haloperidol lactate  5 mg  Intramuscular Q4H PRN Max 4/day Wendie Teran MD      hydrOXYzine HCL  50 mg Oral Q6H PRN Max 4/day Jean Marie Zepeda MD      hydrOXYzine HCL  75 mg Oral Q6H PRN Max 4/day Jean Marie Zepeda MD      lidocaine  1 patch Topical Daily Prema L Dagnall, PA-C      loratadine  10 mg Oral Daily Prema L Dagnall, PA-C      LORazepam  0.5 mg Oral Q6H PRN Jean Marie Zepeda MD      melatonin  3 mg Oral HS Wendie Teran MD      nicotine polacrilex  4 mg Oral Q2H PRN Wendie Teran MD      ondansetron  4 mg Oral Q6H PRN Prema L Dagnall, PA-C      propranolol  5 mg Oral Q8H PRN Wendie Teran MD      risperiDONE  0.25 mg Oral Q4H PRN Max 6/day Wendie Teran MD      risperiDONE  0.5 mg Oral Q4H PRN Max 3/day Wendie Teran MD      risperiDONE  1 mg Oral Q2H PRN Max 3/day Wendie Teran MD      traZODone  100 mg Oral HS Jean Marie Zepeda MD      traZODone  50 mg Oral Q6H PRN Max 3/day Wendie Teran MD         Risks / Benefits of Treatment:    Risks, benefits, and possible side effects of medications explained to patient and patient verbalizes understanding and agreement for treatment.    Counseling / Coordination of Care:    Patient's progress discussed with staff in treatment team meeting.  Medications, treatment progress and treatment plan reviewed with patient.  Supportive therapy provided to patient.  Group attendance encouraged.    Jean Marie Zepeda MD 09/10/24

## 2024-09-10 NOTE — PROGRESS NOTES
"Progress Note - Jolynn Guillory 63 y.o. female MRN: [id number]    Unit/Bed#: -01 Encounter: [id number]        Subjective:   Patient seen and examined at bedside after reviewing the chart and discussing the case with the caring staff.      Patient examined at bedside.  Patient reports no acute symptoms.    Patient is being discharged on Thursday, 9/12/2024.    Physical Exam   Vitals: Blood pressure 126/70, pulse 91, temperature 97.7 °F (36.5 °C), temperature source Temporal, resp. rate 18, height 4' 8\" (1.422 m), weight 84.6 kg (186 lb 6.4 oz), SpO2 96%.,Body mass index is 41.79 kg/m².  Constitutional: Patient in no acute distress.  HEENT: PERR, EOMI, MMM.  Cardiovascular: Normal rate and regular rhythm.    Pulmonary/Chest: Effort normal and breath sounds normal.   Abdomen: Soft, + BS, NT.    Assessment/Plan:  Jolynn Guillory is a(n) 63 y.o. female with bipolar disorder.     Prediabetes.  Hgb A1c 5.8% on 8/30/24.  Diet controlled.  DJD/OA/chronic back and right shoulder pain.  Tylenol as needed.  Add lidocaine patch daily to right shoulder 9/8.  Seasonal allergies.  Start Claritin 10 mg daily and Flonase as needed 9/5.  Psoriasis.  Stable at this time.  Vitamin D deficiency.  Patient is on vitamin D3 1000 units daily.  Vitamin B12 deficiency.  Patient started vitamin B12 monthly injections.  GERD/nausea.  Stable.  Maalox as needed.  Add Zofran as needed 9/7    The patient was discussed with Dr. Mora and he is in agreement with the above note.  "

## 2024-09-10 NOTE — NURSING NOTE
Patient is visible in milieu with peers, medication compliant and cooperative. Patient endorses mild anxiety and depression and denies SI/HI and hallucinations. Continued care and continual safety checks in progress.

## 2024-09-10 NOTE — NURSING NOTE
Minimally social with peers.  Out in the community. Quiet/subdued.  No suicidal ideations noted.  Rated anxiety and depression as moderate. Compliant with medications and snacks.  No aspiration risks noted.  Fluids at bedside to promote hydration.  Maintained on q 7 minute checks.  Medication education given.  Care Plan reviewed and amended. Will continue to monitor.

## 2024-09-10 NOTE — NURSING NOTE
Presents with blunted yet pleasant affect,endorses mild depression and anxiety;denies SI,HI,AH,VH.She is reserved yet makes needs known,is compliant with medications,unit rules,appetite is good,is independent with ADLs and ambulation.We discussed s/s of impending psychological decompensation and when to seek assistance.Will continue to educate,monitor,and provide safe,therapeutic milieu.

## 2024-09-10 NOTE — PLAN OF CARE
Problem: Ineffective Coping  Goal: Cooperates with admission process  Description: Interventions:   - Complete admission process  Outcome: Completed     Problem: Ineffective Coping  Goal: Free from restraint events  Description: - Utilize least restrictive measures   - Provide behavioral interventions   - Redirect inappropriate behaviors   Outcome: Progressing     Problem: Risk for Self Injury/Neglect  Goal: Treatment Goal: Remain safe during length of stay, learn and adopt new coping skills, and be free of self-injurious ideation, impulses and acts at the time of discharge  Outcome: Progressing     Problem: Risk for Self Injury/Neglect  Goal: Complete daily ADLs, including personal hygiene independently, as able  Description: Interventions:  - Observe, teach, and assist patient with ADLS  - Monitor and promote a balance of rest/activity, with adequate nutrition and elimination  Outcome: Progressing     Problem: Depression  Goal: Refrain from harming self  Description: Interventions:  - Monitor patient closely, per order   - Supervise medication ingestion, monitor effects and side effects   Outcome: Progressing     Problem: Depression  Goal: Refrain from self-neglect  Outcome: Progressing     Problem: Anxiety  Goal: Anxiety is at manageable level  Description: Interventions:  - Assess and monitor patient's anxiety level.   - Monitor for signs and symptoms (heart palpitations, chest pain, shortness of breath, headaches, nausea, feeling jumpy, restlessness, irritable, apprehensive).   - Collaborate with interdisciplinary team and initiate plan and interventions as ordered.  - [place] patient to unit/surroundings  - Explain treatment plan  - Encourage participation in care  - Encourage verbalization of concerns/fears  - Identify coping mechanisms  - Assist in developing anxiety-reducing skills  - Administer/offer alternative therapies  - Limit or eliminate stimulants  Outcome: Progressing     Problem:  Nutrition/Hydration-ADULT  Goal: Nutrient/Hydration intake appropriate for improving, restoring or maintaining nutritional needs  Description: Monitor and assess patient's nutrition/hydration status for malnutrition. Collaborate with interdisciplinary team and initiate plan and interventions as ordered.  Monitor patient's weight and dietary intake as ordered or per policy. Utilize nutrition screening tool and intervene as necessary. Determine patient's food preferences and provide high-protein, high-caloric foods as appropriate.     INTERVENTIONS:  - Monitor oral intake, urinary output, labs, and treatment plans  - Assess nutrition and hydration status and recommend course of action  - Evaluate amount of meals eaten  - Assist patient with eating if necessary   - Allow adequate time for meals  - Recommend/ encourage appropriate diets, oral nutritional supplements, and vitamin/mineral supplements  - Order, calculate, and assess calorie counts as needed  - Recommend, monitor, and adjust tube feedings and TPN/PPN based on assessed needs  - Assess need for intravenous fluids  - Provide specific nutrition/hydration education as appropriate  - Include patient/family/caregiver in decisions related to nutrition  Outcome: Progressing

## 2024-09-10 NOTE — PLAN OF CARE
Problem: DISCHARGE PLANNING  Goal: Discharge to home or other facility with appropriate resources  Description: INTERVENTIONS:  - Identify barriers to discharge w/patient and caregiver  - Arrange for needed discharge resources and transportation as appropriate  - Identify discharge learning needs (meds, wound care, etc.)  - Arrange for interpretive services to assist at discharge as needed  - Refer to Case Management Department for coordinating discharge planning if the patient needs post-hospital services based on physician/advanced practitioner order or complex needs related to functional status, cognitive ability, or social support system  Outcome: Progressing     Problem: Risk for Self Injury/Neglect  Goal: Treatment Goal: Remain safe during length of stay, learn and adopt new coping skills, and be free of self-injurious ideation, impulses and acts at the time of discharge  Outcome: Progressing  Goal: Attend and participate in unit activities, including therapeutic, recreational, and educational groups  Description: Interventions:  - Provide therapeutic and educational activities daily, encourage attendance and participation, and document same in the medical record  - Obtain collateral information, encourage visitation and family involvement in care   Outcome: Progressing  Goal: Recognize maladaptive responses and adopt new coping mechanisms  Outcome: Progressing  Goal: Complete daily ADLs, including personal hygiene independently, as able  Description: Interventions:  - Observe, teach, and assist patient with ADLS  - Monitor and promote a balance of rest/activity, with adequate nutrition and elimination  Outcome: Progressing     Problem: Depression  Goal: Treatment Goal: Demonstrate behavioral control of depressive symptoms, verbalize feelings of improved mood/affect, and adopt new coping skills prior to discharge  Outcome: Progressing  Goal: Verbalize thoughts and feelings  Description: Interventions:  -  Assess and re-assess patient's level of risk   - Engage patient in 1:1 interactions, daily, for a minimum of 15 minutes   - Encourage patient to express feelings, fears, frustrations, hopes   Outcome: Progressing  Goal: Refrain from harming self  Description: Interventions:  - Monitor patient closely, per order   - Supervise medication ingestion, monitor effects and side effects   Outcome: Progressing  Goal: Refrain from isolation  Description: Interventions:  - Develop a trusting relationship   - Encourage socialization   Outcome: Progressing  Goal: Refrain from self-neglect  Outcome: Progressing     Problem: Anxiety  Goal: Anxiety is at manageable level  Description: Interventions:  - Assess and monitor patient's anxiety level.   - Monitor for signs and symptoms (heart palpitations, chest pain, shortness of breath, headaches, nausea, feeling jumpy, restlessness, irritable, apprehensive).   - Collaborate with interdisciplinary team and initiate plan and interventions as ordered.  - [place] patient to unit/surroundings  - Explain treatment plan  - Encourage participation in care  - Encourage verbalization of concerns/fears  - Identify coping mechanisms  - Assist in developing anxiety-reducing skills  - Administer/offer alternative therapies  - Limit or eliminate stimulants  Outcome: Progressing     Problem: Nutrition/Hydration-ADULT  Goal: Nutrient/Hydration intake appropriate for improving, restoring or maintaining nutritional needs  Description: Monitor and assess patient's nutrition/hydration status for malnutrition. Collaborate with interdisciplinary team and initiate plan and interventions as ordered.  Monitor patient's weight and dietary intake as ordered or per policy. Utilize nutrition screening tool and intervene as necessary. Determine patient's food preferences and provide high-protein, high-caloric foods as appropriate.     INTERVENTIONS:  - Monitor oral intake, urinary output, labs, and treatment  plans  - Assess nutrition and hydration status and recommend course of action  - Evaluate amount of meals eaten  - Assist patient with eating if necessary   - Allow adequate time for meals  - Recommend/ encourage appropriate diets, oral nutritional supplements, and vitamin/mineral supplements  - Order, calculate, and assess calorie counts as needed  - Recommend, monitor, and adjust tube feedings and TPN/PPN based on assessed needs  - Assess need for intravenous fluids  - Provide specific nutrition/hydration education as appropriate  - Include patient/family/caregiver in decisions related to nutrition  Outcome: Progressing

## 2024-09-10 NOTE — PROGRESS NOTES
09/09/24     Team Meeting   Meeting Type Daily Rounds   Team Members Present   Team Members Present Physician;Occupational Therapist;Nurse;   Physician Team Member Dr Katelyn SCHNEIDER   Nursing Team Member Wes BORWN   Social Work Team Member Abrahan CLANCY   OT Team Member Sacha CHENEY   Patient/Family Present   Patient Present No   Patient's Family Present No   Bright, pleasant, med compliant, slept, denies all, no no d/c date due med adjustments, return home with [facility] ACT

## 2024-09-10 NOTE — NURSING NOTE
Patient observed sleeping during most Q 7 minute safety checks. No SI/HI/AH/VH noted.  Patient shows no s/s of distress.  No complaints of pain or aspiration risks.  Non-labored breathing.  Monitoring continues.  Fluids at bedside to promote hydration.

## 2024-09-11 PROBLEM — F32.9 MDD (MAJOR DEPRESSIVE DISORDER): Status: RESOLVED | Noted: 2024-09-11 | Resolved: 2024-09-11

## 2024-09-11 PROBLEM — F31.4 BIPOLAR DISORDER WITH SEVERE DEPRESSION (HCC): Status: RESOLVED | Noted: 2018-02-22 | Resolved: 2024-09-11

## 2024-09-11 PROBLEM — F32.9 MAJOR DEPRESSIVE DISORDER: Status: ACTIVE | Noted: 2024-09-11

## 2024-09-11 PROBLEM — F32.9 MAJOR DEPRESSIVE DISORDER: Status: RESOLVED | Noted: 2024-09-11 | Resolved: 2024-09-11

## 2024-09-11 PROBLEM — F32.9 MDD (MAJOR DEPRESSIVE DISORDER): Status: ACTIVE | Noted: 2024-09-11

## 2024-09-11 PROBLEM — G47.00 INSOMNIA: Status: RESOLVED | Noted: 2018-02-22 | Resolved: 2024-09-11

## 2024-09-11 PROCEDURE — 99232 SBSQ HOSP IP/OBS MODERATE 35: CPT | Performed by: PSYCHIATRY & NEUROLOGY

## 2024-09-11 RX ORDER — LORATADINE 10 MG/1
10 TABLET ORAL DAILY
Qty: 30 TABLET | Refills: 0 | Status: SHIPPED | OUTPATIENT
Start: 2024-09-12

## 2024-09-11 RX ORDER — UREA 10 %
500 LOTION (ML) TOPICAL DAILY
Qty: 30 TABLET | Refills: 0 | Status: SHIPPED | OUTPATIENT
Start: 2024-09-11

## 2024-09-11 RX ORDER — TRAZODONE HYDROCHLORIDE 100 MG/1
100 TABLET ORAL
Qty: 30 TABLET | Refills: 0 | Status: SHIPPED | OUTPATIENT
Start: 2024-09-11

## 2024-09-11 RX ORDER — LANOLIN ALCOHOL/MO/W.PET/CERES
3 CREAM (GRAM) TOPICAL
Qty: 30 TABLET | Refills: 0 | Status: SHIPPED | OUTPATIENT
Start: 2024-09-11

## 2024-09-11 RX ORDER — BUPROPION HYDROCHLORIDE 150 MG/1
150 TABLET ORAL DAILY
Qty: 30 TABLET | Refills: 0 | Status: SHIPPED | OUTPATIENT
Start: 2024-09-12

## 2024-09-11 RX ORDER — GABAPENTIN 100 MG/1
200 CAPSULE ORAL 3 TIMES DAILY
Qty: 180 CAPSULE | Refills: 0 | Status: SHIPPED | OUTPATIENT
Start: 2024-09-11

## 2024-09-11 RX ADMIN — LORATADINE 10 MG: 10 TABLET ORAL at 08:35

## 2024-09-11 RX ADMIN — TRAZODONE HYDROCHLORIDE 100 MG: 100 TABLET ORAL at 21:15

## 2024-09-11 RX ADMIN — GABAPENTIN 200 MG: 100 CAPSULE ORAL at 08:35

## 2024-09-11 RX ADMIN — GABAPENTIN 200 MG: 100 CAPSULE ORAL at 21:15

## 2024-09-11 RX ADMIN — Medication 1000 UNITS: at 08:35

## 2024-09-11 RX ADMIN — LIDOCAINE 1 PATCH: 50 PATCH CUTANEOUS at 08:36

## 2024-09-11 RX ADMIN — Medication 3 MG: at 21:15

## 2024-09-11 RX ADMIN — BUPROPION HYDROCHLORIDE 150 MG: 150 TABLET, EXTENDED RELEASE ORAL at 08:35

## 2024-09-11 RX ADMIN — GABAPENTIN 200 MG: 100 CAPSULE ORAL at 15:31

## 2024-09-11 NOTE — NURSING NOTE
Presents with blunted affect yet denies depression,anxiety,SI,HI,AH,VH.she attends groups,is reserved yet makes needs known,is compliant with medications,unit rules,is independent with ADLs and ambulation,appetite is good.We discussed the usual process if feeling medications are not working.Will continue to educate,monitor,and provide safe,therapeutic milieu.

## 2024-09-11 NOTE — DISCHARGE SUMMARY
"Discharge Summary - Behavioral Health   Name: Jolynn Guillory 63 y.o. female I MRN: [id number]  Unit/Bed#: [facility] 203-01 I Date of Admission: 9/4/2024   Date of Service: 9/11/2024 I Hospital Day: 7     Discharge Summary - Behavioral Health   Jolynn Guillory 63 y.o. female MRN: [id number]  Unit/Bed#: [facility] 203-01 Encounter: [id number]     Admission Date: 9/4/2024         Discharge Date: 9/12/2024    Attending Psychiatrist: Jean Marie Zepeda MD    Reason for Admission/HPI: MDD (major depressive disorder) [F32.9]  Major depressive disorder [F32.9]    According to H&P of Dr. Zepeda    Patient is a 63 y.o. female presented with a history of depression versus bipolar disorder who was admitted to the inpatient older adult psychiatric unit on a voluntary 201 commitment basis due to depression, anxiety, unstable mood, and inability to care for self. She was initially brought by her  to ED due to severe worsening of depressive symptoms, anxiety, tremor with passive suicidal ideation over the past few months. UDS was positive for benzo. EKG with no acute changes.  On evaluation in the inpatient psychiatric unit Jolynn presents severely depressed, tearful with psychomotor retardation but able to answer question in goal directed way. She reports that she has been feeling increasingly depressed for the past several months with worsening of depressed mood, hopeless, helplessness, anhedonia, increased tearfulness and significant decline in her daily activities. She admits to passive wish to die but denies any active plan or intent to hurt herself and she is able to contract for safety. Patient reports she had one prior psych admission when she was severely depressed and experiencing auditory hallucination telling her to \"get hit by a car\" that culminated receiving treatment in a psych hospital in [facility]. Denies any current auditory hallucination but appears internally preoccupied.  Patient denies any recent alcohol use or " illegal drug but UDS was positive for benzo. She has been prescribed Ambien, Xanax recently. Admits being dx with bipolar disorder as she experienced manic episode in the past. Denies paranoia or delusions. She agreed to be compliant with medication and treatment plan in the unit.    Hospital Course: The patient was admitted to the inpatient psychiatric unit and started on every 7 minutes precautions. During the hospitalization the patient was attending individual therapy, group therapy, milieu therapy and occupational therapy.    Psychiatric medications were titrated over the hospital stay. To address depressive symptoms, mood instability, and insomnia the patient was started on antidepressant Wellbutrin XL, mood stabilizer Neurontin, anxiolytic medication Neurontin, and hypnotic medication Trazodone and Melatonin. Medication doses were titrated during the hospital course. Prior to beginning of treatment medications risks and benefits and possible side effects including risk of suicidality and serotonin syndrome related to treatment with antidepressants and risk of impaired next-day mental alertness, complex sleep-related behavior and dependence related to treatment with hypnotic medications were reviewed with the patient. The patient verbalized understanding and agreement for treatment.     Patient's symptoms improved gradually over the hospital course. At the end of treatment the patient was doing well. Mood was stable at the time of discharge. The patient denied suicidal ideation, intent or plan at the time of discharge and denied homicidal ideation, intent or plan at the time of discharge. There was no overt psychosis at the time of discharge. Sleep and appetite were improved. The patient was tolerating medications and was not reporting any significant side effects at the time of discharge.    Since the patient was doing well at the end of the hospitalization, treatment team felt that the patient could be  safely discharged to outpatient care.     The outpatient follow up with  [facility] and Psych   was arranged by the unit  upon discharge.    Mental Status at time of Discharge:     Appearance:  age appropriate and casually dressed   Behavior:  Calm, pleasant   Speech:  normal pitch and normal volume   Mood:  improved   Affect:  mood-congruent   Thought Process:  goal directed   Thought Content:  No overt delusions   Perceptual Disturbances: Denied AVH, did not appear internally preoccupied   Risk Potential: none   Sensorium:  person, place, and time/date   Cognition:  recent and remote memory grossly intact   Consciousness:  alert and awake    Attention: attention span and concentration were age appropriate   Insight:  fair   Judgment: fair   Gait/Station: normal gait/station   Motor Activity: no abnormal movements     Admission Diagnosis:MDD (major depressive disorder) [F32.9]  Major depressive disorder [F32.9]    Discharge Diagnosis:   Principal Problem (Resolved):    Bipolar disorder with severe depression (HCC)  Active Problems:    Neuropathy    Back pain    Psoriasis    Sciatica of right side    Gastroesophageal reflux disease without esophagitis    Prediabetes    Obesity (BMI 35.0-39.9 without comorbidity)  Resolved Problems:    Insomnia    MDD (major depressive disorder)    Major depressive disorder        Lab results:  Admission on 09/04/2024   Component Date Value    Sodium 09/05/2024 136     Potassium 09/05/2024 4.0     Chloride 09/05/2024 103     CO2 09/05/2024 26     ANION GAP 09/05/2024 7     BUN 09/05/2024 12     Creatinine 09/05/2024 0.63     Glucose 09/05/2024 99     Glucose, Fasting 09/05/2024 99     Calcium 09/05/2024 9.5     AST 09/05/2024 15     ALT 09/05/2024 21     Alkaline Phosphatase 09/05/2024 42     Total Protein 09/05/2024 7.4     Albumin 09/05/2024 4.3     Total Bilirubin 09/05/2024 0.65     eGFR 09/05/2024 97     WBC 09/05/2024 7.39     RBC 09/05/2024 4.47     Hemoglobin  09/05/2024 12.9     Hematocrit 09/05/2024 40.0     MCV 09/05/2024 90     MCH 09/05/2024 28.9     MCHC 09/05/2024 32.3     RDW 09/05/2024 12.4     MPV 09/05/2024 11.9     Platelets 09/05/2024 204     nRBC 09/05/2024 0     Segmented % 09/05/2024 67     Immature Grans % 09/05/2024 0     Lymphocytes % 09/05/2024 22     Monocytes % 09/05/2024 9     Eosinophils Relative 09/05/2024 2     Basophils Relative 09/05/2024 0     Absolute Neutrophils 09/05/2024 4.95     Absolute Immature Grans 09/05/2024 0.01     Absolute Lymphocytes 09/05/2024 1.64     Absolute Monocytes 09/05/2024 0.65     Eosinophils Absolute 09/05/2024 0.11     Basophils Absolute 09/05/2024 0.03     Vitamin B-12 09/05/2024 293     Folate 09/05/2024 21.1     Vit D, 25-Hydroxy 09/05/2024 31.8     Cholesterol 09/05/2024 157     Triglycerides 09/05/2024 87     HDL, Direct 09/05/2024 46 (L)     LDL Calculated 09/05/2024 94     Non-HDL-Chol (CHOL-HDL) 09/05/2024 111     Syphilis Total Antibody 09/05/2024 Non-reactive     Ventricular Rate 09/05/2024 86     Atrial Rate 09/05/2024 86     NE Interval 09/05/2024 162     QRSD Interval 09/05/2024 76     QT Interval 09/05/2024 364     QTC Interval 09/05/2024 435     P Axis 09/05/2024 29     QRS Mallory 09/05/2024 14     T Wave Mallory 09/05/2024 39        Discharge Medications:  Current Discharge Medication List        START taking these medications    Details   buPROPion (WELLBUTRIN XL) 150 mg 24 hr tablet Take 1 tablet (150 mg total) by mouth daily  Qty: 30 tablet, Refills: 0    Associated Diagnoses: Bipolar disorder with severe depression (HCC); Major depressive disorder      Cholecalciferol (VITAMIN D3) 1,000 units tablet Take 1 tablet (1,000 Units total) by mouth daily  Qty: 30 tablet, Refills: 0    Associated Diagnoses: Vitamin D deficiency      gabapentin (NEURONTIN) 100 mg capsule Take 2 capsules (200 mg total) by mouth 3 (three) times a day  Qty: 180 capsule, Refills: 0    Associated Diagnoses: Bipolar disorder with  severe depression (HCC)      loratadine (CLARITIN) 10 mg tablet Take 1 tablet (10 mg total) by mouth daily  Qty: 30 tablet, Refills: 0    Associated Diagnoses: Seasonal allergies      melatonin 3 mg Take 1 tablet (3 mg total) by mouth daily at bedtime  Qty: 30 tablet, Refills: 0    Associated Diagnoses: Primary insomnia      traZODone (DESYREL) 100 mg tablet Take 1 tablet (100 mg total) by mouth daily at bedtime  Qty: 30 tablet, Refills: 0    Associated Diagnoses: Primary insomnia      vitamin B-12 (VITAMIN B-12) 500 mcg tablet Take 1 tablet (500 mcg total) by mouth daily  Qty: 30 tablet, Refills: 0    Associated Diagnoses: Vitamin B12 deficiency              Current Discharge Medication List        STOP taking these medications       ALPRAZolam (XANAX) 0.5 mg tablet Comments:   Reason for Stopping:         ARIPiprazole (ABILIFY) 5 mg tablet Comments:   Reason for Stopping:         buPROPion (WELLBUTRIN) 75 mg tablet Comments:   Reason for Stopping:         amcinonide (CYCLOCORT) 0.1 % lotion Comments:   Reason for Stopping:         clobetasol (TEMOVATE) 0.05 % external solution Comments:   Reason for Stopping:         Diclofenac Sodium (VOLTAREN) 1 % Comments:   Reason for Stopping:         ketoconazole (NIZORAL) 2 % shampoo Comments:   Reason for Stopping:         zolpidem (AMBIEN) 10 mg tablet Comments:   Reason for Stopping:                Current Discharge Medication List           Current Discharge Medication List        CONTINUE these medications which have NOT CHANGED    Details   Blood Pressure KIT by Does not apply route daily  Qty: 1 each, Refills: 0    Associated Diagnoses: Dandruff      acetaminophen (TYLENOL) 325 mg tablet Take 2 tablets (650 mg total) by mouth every 6 (six) hours as needed for mild pain or fever  Qty: 30 tablet, Refills: 0    Associated Diagnoses: URI (upper respiratory infection)              Discharge instructions/Information to patient and family:   See after visit summary for  information provided to patient and family.      Provisions for Follow-Up Care:  See after visit summary for information related to follow-up care and any pertinent home health orders.      Discharge Statement     I spent 30 minutes discharging the patient. This time was spent on the day of discharge. I had direct contact with the patient on the day of discharge.     Additional documentation is required if more than 30 minutes were spent on discharge:    I reviewed with Jolynn importance of compliance with medications and outpatient treatment after discharge.  I discussed the medication regimen and possible side effects of the medications with Jolynn prior to discharge. At the time of discharge she was tolerating psychiatric medications.  I discussed outpatient follow up with Jolynn.  I reviewed with Jolynn crisis plan and safety plan upon discharge.  Patient educated on the reasons for discontinuing Xanax and Ambien, as well as on the adverse reactions associated with Xanax and Ambien which includes:   suicidality,  dependency,   potential for abuse and   respiratory depression.   Patient verbalized understanding. Meeting ended mutually.

## 2024-09-11 NOTE — PROGRESS NOTES
09/11/24 1658   Discharge Planning   Living Arrangements Lives w/ Spouse/significant other   Support Systems Self;Spouse/significant other;Daughter;Son;Psychiatrist;Therapist;/   Assistance Needed follow through with op services   Type of Current Residence Private residence   Current Home Care Services No   Other Referral/Resources/Interventions Provided:   Referrals Provided: Psychiatrist;ICM;Therapist   Discharge Communications   Discharge planning discussed with: pt tx team icm psych therapist family   Transportation at Discharge? Yes   Transport at Discharge  Auto with designated    Transported by (Company and Unit #) family   Contacts   Patient Contacts Evy Flores (daughter)   Relationship to Patient: Family   Contact Method Phone   Phone Number [phone]   Reason/Outcome Continuity of Care;Emergency Contact;Discharge Planning   [facility]   Would you like to participate in our [facility] service program?   No - Declined

## 2024-09-11 NOTE — PLAN OF CARE
Problem: Ineffective Coping  Goal: Participates in unit activities  Description: Interventions:  - Provide therapeutic environment   - Provide required programming   - Redirect inappropriate behaviors   Outcome: Progressing     Problem: Ineffective Coping  Goal: Patient/Family participate in treatment and DC plans  Description: Interventions:  - Provide therapeutic environment  Outcome: Progressing     Problem: Ineffective Coping  Goal: Patient/Family verbalizes awareness of resources  Outcome: Progressing     Problem: Risk for Self Injury/Neglect  Goal: Treatment Goal: Remain safe during length of stay, learn and adopt new coping skills, and be free of self-injurious ideation, impulses and acts at the time of discharge  Outcome: Progressing     Problem: Risk for Self Injury/Neglect  Goal: Attend and participate in unit activities, including therapeutic, recreational, and educational groups  Description: Interventions:  - Provide therapeutic and educational activities daily, encourage attendance and participation, and document same in the medical record  - Obtain collateral information, encourage visitation and family involvement in care   Outcome: Progressing     Problem: Depression  Goal: Refrain from harming self  Description: Interventions:  - Monitor patient closely, per order   - Supervise medication ingestion, monitor effects and side effects   Outcome: Progressing     Problem: Depression  Goal: Refrain from isolation  Description: Interventions:  - Develop a trusting relationship   - Encourage socialization   Outcome: Progressing

## 2024-09-11 NOTE — PROGRESS NOTES
Progress Note - Behavioral Health   Name: Jolynn Guillory 63 y.o. female I MRN: [id number]  Unit/Bed#: [facility]01 I Date of Admission: 9/4/2024   Date of Service: 9/11/2024 I Hospital Day: 7    Assessment & Plan  Bipolar disorder with severe depression (HCC)  Jolynn was seen today for psychiatric follow-up. Patient is  calm, cooperative. Patient continues to be more visible and social with peers. She reports mild anxiety and depression due to continued inpatient hospitalization. Patient reassured. She is medication compliant. She denies any SI/HI/AVH. She did not appear internally preoccupied.  Insomnia  Patient denied any sleep disturbance. Continue Trazodone 100 mg po daily HS for insomnia.    Progress Toward Goals: Patient continues to exhibit a controlled mood on the unit with no recent behaviors. She is compliant with her current psychotropic medication regimen. She denied side effects from current psychotropic medication regimen. Anticipated discharge on 9/12/2024.    Recommended Treatment: Continue with group therapy, milieu therapy and occupational therapy.      Risks, benefits and possible side effects of Medications:   Risks, benefits, and possible side effects of medications explained to patient and patient verbalizes understanding.      History of Present Illness   Behavior over the last 24 hours:  improved  Sleep: normal  Appetite: normal  Medication side effects: No  ROS: no complaints and all other systems are negative      Objective   Mental Status Evaluation:  Appearance:  age appropriate and casually dressed   Behavior:  cooperative   Speech:  normal pitch and normal volume   Mood:  anxious and depressed   Affect:  constricted   Thought Process:  goal directed   Associations: intact associations   Thought Content:  No overt delusions   Perceptual Disturbances: Denied AVH, did not appear internally preoccupied   Risk Potential: Suicidal Ideations none  Homicidal Ideations none  Potential for  Aggression No   Sensorium:  person, place, and time/date   Memory:  recent and remote memory grossly intact   Consciousness:  alert and awake    Attention: attention span and concentration were age appropriate   Insight:  fair   Judgment: fair   Gait/Station: normal gait/station   Motor Activity: no abnormal movements     Medications: all current active meds have been reviewed.      Lab Results: I have reviewed the following results:   Most Recent Labs:   Lab Results   Component Value Date    WBC 7.39 09/05/2024    RBC 4.47 09/05/2024    HGB 12.9 09/05/2024    HCT 40.0 09/05/2024     09/05/2024    RDW 12.4 09/05/2024    NEUTROABS 4.95 09/05/2024    SODIUM 136 09/05/2024    K 4.0 09/05/2024     09/05/2024    CO2 26 09/05/2024    BUN 12 09/05/2024    CREATININE 0.63 09/05/2024    GLUC 99 09/05/2024    GLUF 99 09/05/2024    CALCIUM 9.5 09/05/2024    AST 15 09/05/2024    ALT 21 09/05/2024    ALKPHOS 42 09/05/2024    TP 7.4 09/05/2024    ALB 4.3 09/05/2024    TBILI 0.65 09/05/2024    CHOLESTEROL 157 09/05/2024    HDL 46 (L) 09/05/2024    TRIG 87 09/05/2024    LDLCALC 94 09/05/2024    NONHDLC 111 09/05/2024    [id number] 4.770 (H) 09/04/2024    FREET4 0.67 08/30/2024    HGBA1C 5.8 (H) 08/30/2024     08/30/2024       Administrative Statements   I have spent a total time of 25 minutes in caring for this patient on the day of the visit/encounter including Risks and benefits of tx options.

## 2024-09-11 NOTE — SOCIAL WORK
Cm spoke to pt daughter Evy [phone]. Reviewed d/c plan and supports.  Evy in agreement. She states her father and  will be p/u pt tomorrow at 11am.  Evy expressed gratitude and expressed no issues/concerns.

## 2024-09-11 NOTE — PROGRESS NOTES
"Progress Note - Jolynn Guillory 63 y.o. female MRN: [id number]    Unit/Bed#: -01 Encounter: [id number]        Subjective:   Patient seen and examined at bedside after reviewing the chart and discussing the case with the caring staff.      Patient examined at bedside.  Patient reports no acute symptoms.    Patient is being discharged on Thursday, 9/12/2024.    Physical Exam   Vitals: Blood pressure 162/73, pulse 101, temperature 97.6 °F (36.4 °C), temperature source Temporal, resp. rate 18, height 4' 8\" (1.422 m), weight 84.6 kg (186 lb 6.4 oz), SpO2 98%.,Body mass index is 41.79 kg/m².  Constitutional: Patient in no acute distress.  HEENT: PERR, EOMI, MMM.  Cardiovascular: Normal rate and regular rhythm.    Pulmonary/Chest: Effort normal and breath sounds normal.   Abdomen: Soft, + BS, NT.    Assessment/Plan:  Jolynn Guillory is a(n) 63 y.o. female with bipolar disorder.     Prediabetes.  Hgb A1c 5.8% on 8/30/24.  Diet controlled.  DJD/OA/chronic back and right shoulder pain.  Tylenol as needed.  Add lidocaine patch daily to right shoulder 9/8.  Seasonal allergies.  Start Claritin 10 mg daily and Flonase as needed 9/5.  Psoriasis.  Stable at this time.  Vitamin D deficiency.  Patient is on vitamin D3 1000 units daily.  Vitamin B12 deficiency.  Patient started vitamin B12 monthly injections.  GERD/nausea.  Stable.  Maalox as needed.  Add Zofran as needed 9/7    The patient was discussed with Dr. Mora and he is in agreement with the above note.  "

## 2024-09-11 NOTE — ASSESSMENT & PLAN NOTE
Jolynn was seen today for psychiatric follow-up. Patient is  calm, cooperative. Patient continues to be more visible and social with peers. She reports mild anxiety and depression due to continued inpatient hospitalization. Patient reassured. She is medication compliant. She denies any SI/HI/AVH. She did not appear internally preoccupied.

## 2024-09-11 NOTE — NURSING NOTE
Patient has been visible in the milieu today. She is calm and cooperative. Smiles but appears sad. She denies anxiety and depression. Denies Si/Hi and hallucinations. Endorses wanting to go home. She has been compliant with her scheduled medications. Her appetite is good. She is up and ambulatory without difficulty. She is able to make her needs known and offers no current complaints/ concerns. Plan of care continues. Q7 minute safety checks in progress.

## 2024-09-11 NOTE — PROGRESS NOTES
09/11/24    Team Meeting   Meeting Type Daily Rounds   Team Members Present   Team Members Present Physician;Nurse;;Occupational Therapist   Physician Team Member MD Rogelio Pacheco CRNP, Dr Eduardo MD   Nursing Team Member STEPHANIE James   Social Work Team Member ASTON Gauthier   OT Team Member NONI Goncalves   Patient/Family Present   Patient Present No   Patient's Family Present No   201, d/c home [facility] with psych f/u and [facility], denies all, slept, compliant

## 2024-09-11 NOTE — BH TRANSITION RECORD
Contact Information: If you have any questions, concerns, pended studies, tests and/or procedures, or emergencies regarding your inpatient behavioral health visit. Please contact [facility] [phone] and ask to speak to a , nurse or physician. A contact is available 24 hours/ 7 days a week at this number.     Summary of Procedures Performed During your Stay:  Below is a list of major procedures performed during your hospital stay and a summary of results:  - Cardiac Procedures/Studies: EKG. Normal sinus rhythm, QTC interval 435    Pending Studies (From admission, onward)      None          Please follow up on the above pending studies with your PCP and/or referring provider.

## 2024-09-11 NOTE — PLAN OF CARE
Problem: Ineffective Coping  Goal: Participates in unit activities  Description: Interventions:  - Provide therapeutic environment   - Provide required programming   - Redirect inappropriate behaviors   Outcome: Progressing     Problem: Risk for Self Injury/Neglect  Goal: Attend and participate in unit activities, including therapeutic, recreational, and educational groups  Description: Interventions:  - Provide therapeutic and educational activities daily, encourage attendance and participation, and document same in the medical record  - Obtain collateral information, encourage visitation and family involvement in care   Outcome: Progressing   Patient out for groups and shares with peers and staff appropriately.

## 2024-09-12 ENCOUNTER — TELEPHONE (OUTPATIENT)
Dept: INTERNAL MEDICINE CLINIC | Facility: CLINIC | Age: 64
End: 2024-09-12

## 2024-09-12 VITALS
TEMPERATURE: 97.6 F | RESPIRATION RATE: 18 BRPM | HEIGHT: 56 IN | BODY MASS INDEX: 41.93 KG/M2 | SYSTOLIC BLOOD PRESSURE: 134 MMHG | OXYGEN SATURATION: 97 % | HEART RATE: 87 BPM | DIASTOLIC BLOOD PRESSURE: 73 MMHG | WEIGHT: 186.4 LBS

## 2024-09-12 PROCEDURE — 99238 HOSP IP/OBS DSCHRG MGMT 30/<: CPT

## 2024-09-12 RX ADMIN — LIDOCAINE 1 PATCH: 50 PATCH CUTANEOUS at 08:48

## 2024-09-12 RX ADMIN — GABAPENTIN 200 MG: 100 CAPSULE ORAL at 08:47

## 2024-09-12 RX ADMIN — BUPROPION HYDROCHLORIDE 150 MG: 150 TABLET, EXTENDED RELEASE ORAL at 08:47

## 2024-09-12 RX ADMIN — Medication 1000 UNITS: at 08:47

## 2024-09-12 RX ADMIN — LORATADINE 10 MG: 10 TABLET ORAL at 08:47

## 2024-09-12 NOTE — NURSING NOTE
Upon assessment pt is calm and cooperative. Pleasant. Minimal in conversation but she is appropriate. Social with select peers. She remains compliant with her scheduled medications. Her appetite is good. She continues to appear sad. Endorses mild depression. Denies anxiety. Denies Si/Hi and hallucinations. Endorses feeling ready for discharge today. She is up and ambulatory without difficulty. She is able ot make her needs known and offers no current complaints/ concerns. Plan of care continues. Q7 minute safety checks in progress.

## 2024-09-12 NOTE — PLAN OF CARE
Problem: Ineffective Coping  Goal: Demonstrates healthy coping skills  Outcome: Progressing  Goal: Participates in unit activities  Description: Interventions:  - Provide therapeutic environment   - Provide required programming   - Redirect inappropriate behaviors   Outcome: Progressing  Goal: Patient/Family participate in treatment and DC plans  Description: Interventions:  - Provide therapeutic environment  Outcome: Progressing  Goal: Patient/Family verbalizes awareness of resources  Outcome: Progressing  Goal: Understands least restrictive measures  Description: Interventions:  - Utilize least restrictive behavior  Outcome: Progressing  Goal: Free from restraint events  Description: - Utilize least restrictive measures   - Provide behavioral interventions   - Redirect inappropriate behaviors   Outcome: Progressing     Problem: Risk for Self Injury/Neglect  Goal: Complete daily ADLs, including personal hygiene independently, as able  Description: Interventions:  - Observe, teach, and assist patient with ADLS  - Monitor and promote a balance of rest/activity, with adequate nutrition and elimination  Outcome: Progressing     Problem: Depression  Goal: Verbalize thoughts and feelings  Description: Interventions:  - Assess and re-assess patient's level of risk   - Engage patient in 1:1 interactions, daily, for a minimum of 15 minutes   - Encourage patient to express feelings, fears, frustrations, hopes   Outcome: Progressing  Goal: Refrain from harming self  Description: Interventions:  - Monitor patient closely, per order   - Supervise medication ingestion, monitor effects and side effects   Outcome: Progressing  Goal: Refrain from self-neglect  Outcome: Progressing     Problem: Anxiety  Goal: Anxiety is at manageable level  Description: Interventions:  - Assess and monitor patient's anxiety level.   - Monitor for signs and symptoms (heart palpitations, chest pain, shortness of breath, headaches, nausea, feeling  jumpy, restlessness, irritable, apprehensive).   - Collaborate with interdisciplinary team and initiate plan and interventions as ordered.  - [place] patient to unit/surroundings  - Explain treatment plan  - Encourage participation in care  - Encourage verbalization of concerns/fears  - Identify coping mechanisms  - Assist in developing anxiety-reducing skills  - Administer/offer alternative therapies  - Limit or eliminate stimulants  Outcome: Progressing     Problem: Nutrition/Hydration-ADULT  Goal: Nutrient/Hydration intake appropriate for improving, restoring or maintaining nutritional needs  Description: Monitor and assess patient's nutrition/hydration status for malnutrition. Collaborate with interdisciplinary team and initiate plan and interventions as ordered.  Monitor patient's weight and dietary intake as ordered or per policy. Utilize nutrition screening tool and intervene as necessary. Determine patient's food preferences and provide high-protein, high-caloric foods as appropriate.     INTERVENTIONS:  - Monitor oral intake, urinary output, labs, and treatment plans  - Assess nutrition and hydration status and recommend course of action  - Evaluate amount of meals eaten  - Assist patient with eating if necessary   - Allow adequate time for meals  - Recommend/ encourage appropriate diets, oral nutritional supplements, and vitamin/mineral supplements  - Order, calculate, and assess calorie counts as needed  - Recommend, monitor, and adjust tube feedings and TPN/PPN based on assessed needs  - Assess need for intravenous fluids  - Provide specific nutrition/hydration education as appropriate  - Include patient/family/caregiver in decisions related to nutrition  Outcome: Progressing

## 2024-09-12 NOTE — NURSING NOTE
"Patient was observed to be visible in the community this evening; spending time in the dining area with female peer.  She has been calm and cooperative; pleasant and polite. Denies anxiety, endorses \"a little\" depression.  Denies SI, HI and hallucinations.  Denies any pain. Jolynn was medication compliant at .  She states she is ready to go home tomorrow. Continuous safety rounding in progress.   "

## 2024-09-12 NOTE — TELEPHONE ENCOUNTER
Received call from CHLOE Mehta with [facility]. Patient discharged to a  unit and will need at [facility] appointment once discharged.     Attempted to reach Janine at . Left voice message and office number for any follow up questions/concerns. Office will follow up with patient once discharged from  unit.

## 2024-09-12 NOTE — NURSING NOTE
Pt ambulated form unit accompanied by staff. AVS reviewed w/ pt and she verbalizes understanding. All belongings sent w/ pt.

## 2024-09-12 NOTE — PROGRESS NOTES
"Progress Note - Jolynn Guillory 63 y.o. female MRN: [id number]    Unit/Bed#: -01 Encounter: [id number]        Subjective:   Patient seen and examined at bedside after reviewing the chart and discussing the case with the caring staff.      Patient examined at bedside.  Patient reports no acute symptoms.    Patient is being discharged today, Thursday, 9/12/2024.    Physical Exam   Vitals: Blood pressure 134/73, pulse 87, temperature 97.6 °F (36.4 °C), temperature source Temporal, resp. rate 18, height 4' 8\" (1.422 m), weight 84.6 kg (186 lb 6.4 oz), SpO2 97%.,Body mass index is 41.79 kg/m².  Constitutional: Patient in no acute distress.  HEENT: PERR, EOMI, MMM.  Cardiovascular: Normal rate and regular rhythm.    Pulmonary/Chest: Effort normal and breath sounds normal.   Abdomen: Soft, + BS, NT.    Assessment/Plan:  Jolynn Guillory is a(n) 63 y.o. female with bipolar disorder.     Medical clearance.  Patient is medically cleared for discharge.  All scripts were sent out for the patient.    Prediabetes.  Hgb A1c 5.8% on 8/30/24.  Diet controlled.  DJD/OA/chronic back and right shoulder pain.  Tylenol as needed.  Add lidocaine patch daily to right shoulder 9/8.  Seasonal allergies.  Start Claritin 10 mg daily and Flonase as needed 9/5.  Psoriasis.  Stable at this time.  Vitamin D deficiency.  Patient is on vitamin D3 1000 units daily.  Vitamin B12 deficiency.  Patient started vitamin B12 monthly injections.  GERD/nausea.  Stable.  Maalox as needed.  Add Zofran as needed 9/7    The patient was discussed with Dr. Mora and he is in agreement with the above note.  "

## 2024-09-12 NOTE — PROGRESS NOTES
09/12/24    Team Meeting   Meeting Type Daily Rounds   Team Members Present   Team Members Present Physician;Nurse;;Occupational Therapist   Physician Team Member MD Rogelio Pacheco CRNP, Dr Eduardo MD   Nursing Team Member STEPHANIE Ortega   Social Work Team Member ASTON Gauthier   OT Team Member NONI Goncalves   Pharm                                          Annie PharmD   Patient/Family Present   Patient Present No   Patient's Family Present No   201, pt d/c home today with psych f/u at [facility] and [facility], denies all

## 2024-09-21 NOTE — PROGRESS NOTES
"Subjective      [id number] used to conduct visit.     Jolynn Guillory is a 63 y.o. female who presents for annual GYN exam.     GYN:  Postmenopausal. Denies postmenopausal bleeding.   Denies vaginal discharge, labial erythema or lesions, dyspareunia.  Contraception: tubal ligation  Patient is not sexually active  Gynecologic surgeries: CS x3, tubal ligation    OB:  OB History    Para Term  AB Living   3 3 3     3   SAB IAB Ectopic Multiple Live Births                  # Outcome Date GA Lbr Tony/2nd Weight Sex Type Anes PTL Lv   3 Term      CS-Unspec      2 Term      CS-Unspec      1 Term      CS-Unspec          :  Denies dysuria, urinary frequency or urgency.  Denies hematuria, flank pain, incontinence.    Breast:  Denies breast mass, skin changes, dimpling, reddening, nipple retraction.  Denies breast discharge.  Patient does not have a family history of breast, endometrial, colon, or ovarian ca.       Past Medical History:   Diagnosis Date    Anemia     Anxiety disorder     Bipolar disorder (HCC)     BMI 34.0-34.9,adult 2023    Psychiatric disorder        Past Surgical History:   Procedure Laterality Date     SECTION      , ,     DENTAL SURGERY      EYE SURGERY Left     TUBAL LIGATION           General:  Diet: well rounded  Exercise: \"occasionally\"  Work: none currently  Safety: feels safe at home     Social History     Tobacco Use    Smoking status: Never    Smokeless tobacco: Never   Vaping Use    Vaping status: Never Used   Substance Use Topics    Alcohol use: No    Drug use: No       Screening:  Cervical cancer: last pap smear in 2019. Results were NILM HPV neg. Denies history of abnormal pap smears.  Patient has not received Gardasil vaccine.   Pap smear completed today.   Breast cancer: last mammogram in 2022. Results were BIRADS 1 (negative).   Mammogram scheduled for 2025  Colon cancer: last colonoscopy in 2014. Results were normal and " "recommended follow up in 10 years ().   Will follow up with PCP  DEXA scan scheduled 2025  STD screening: declines.    Review of Systems   Constitutional:  Negative for chills and fever.   Respiratory: Negative.     Cardiovascular:  Negative for chest pain and palpitations.   Gastrointestinal: Negative.    Genitourinary:  Negative for dysuria, flank pain, hematuria and vaginal bleeding.   Hematological: Negative.         Objective      /70 (BP Location: Left arm, Patient Position: Sitting, Cuff Size: Adult)   Ht 4' 8\" (1.422 m)   Wt 86.1 kg (189 lb 12.8 oz)   LMP  (LMP Unknown)   BMI 42.55 kg/m²   GEN: The patient was alert and oriented x3, pleasant well-appearing female in no acute distress.   HEENT:  Unremarkable, normocephalic, atraumatic  CV:  Regular rate   RESP:  Unlabored breathing  BREAST:  Symmetric breasts with no palpable breast masses or obvious breast lesions. She has no retractions or nipple discharge. She has no axillary abnormalities or palpable masses.   GI:  Soft, nontender, non-distended  MSK: bilateral lower extremities are nontender, no edema  : Normal external female genitalia, normal appearing urethral meatus. On speculum exam, atrophic vaginal epithelium, no vaginal discharge, no bleeding, grossly normal appearing cervix (anterior). On bimanual exam, no cervical motion tenderness; uterus is smooth, nontender. No tenderness or fullness in the bilateral adnexa.           Assessment/Plan  1. Well woman exam with routine gynecological exam  2. Cervical cancer screening  -     Ambulatory Referral to Obstetrics / Gynecology    ASSESSMENT/PLAN: Jolynn Guillory is a 63 y.o.  who presents for annual gynecologic exam.    50-65 postmenopausal  1.  Routine well woman exam done today.  2.  Pap and HPV:Pap with HPV was done today.  Current ASCCP Guidelines reviewed.   3.  Mammogram ordered. Recommend yearly mammography.   4.  Colonoscopy recommended per guidelines.   5. The " patient is not sexually active.   6. The following were reviewed in today's visit: DEXA and mammography appointments, making appointment for colonoscopy.  7. Patient to return to office in 12 months for annual and PRN.       Problem List Items Addressed This Visit       Cervical cancer screening     Other Visit Diagnoses       Well woman exam with routine gynecological exam    -  Primary              Judi Melara  09/23/24  10:02 AM

## 2024-09-23 ENCOUNTER — OFFICE VISIT (OUTPATIENT)
Dept: OBGYN CLINIC | Facility: CLINIC | Age: 64
End: 2024-09-23

## 2024-09-23 VITALS
DIASTOLIC BLOOD PRESSURE: 70 MMHG | WEIGHT: 189.8 LBS | SYSTOLIC BLOOD PRESSURE: 124 MMHG | HEIGHT: 56 IN | BODY MASS INDEX: 42.69 KG/M2

## 2024-09-23 DIAGNOSIS — Z01.419 WELL WOMAN EXAM WITH ROUTINE GYNECOLOGICAL EXAM: Primary | ICD-10-CM

## 2024-09-23 DIAGNOSIS — Z12.4 CERVICAL CANCER SCREENING: ICD-10-CM

## 2024-09-23 PROCEDURE — G0476 HPV COMBO ASSAY CA SCREEN: HCPCS

## 2024-09-23 PROCEDURE — G0145 SCR C/V CYTO,THINLAYER,RESCR: HCPCS

## 2024-09-23 PROCEDURE — 99386 PREV VISIT NEW AGE 40-64: CPT | Performed by: OBSTETRICS & GYNECOLOGY

## 2024-09-26 LAB
LAB AP GYN PRIMARY INTERPRETATION: NORMAL
Lab: NORMAL

## 2024-09-28 PROBLEM — Z12.4 CERVICAL CANCER SCREENING: Status: RESOLVED | Noted: 2023-10-12 | Resolved: 2024-09-28

## 2024-09-28 PROBLEM — Z13.9 SCREENING DUE: Status: RESOLVED | Noted: 2024-08-29 | Resolved: 2024-09-28

## 2024-10-22 ENCOUNTER — OFFICE VISIT (OUTPATIENT)
Dept: MULTI SPECIALTY CLINIC | Facility: CLINIC | Age: 64
End: 2024-10-22

## 2024-10-22 VITALS
HEART RATE: 111 BPM | BODY MASS INDEX: 42.97 KG/M2 | TEMPERATURE: 98.3 F | DIASTOLIC BLOOD PRESSURE: 88 MMHG | WEIGHT: 191 LBS | HEIGHT: 56 IN | SYSTOLIC BLOOD PRESSURE: 146 MMHG

## 2024-10-22 DIAGNOSIS — L40.9 PSORIASIS: Primary | ICD-10-CM

## 2024-10-22 NOTE — PROGRESS NOTES
" Rheumatology Visit   Name: Jolynn Guillory      : 1960      MRN: [id number]  Encounter Provider: Cristino Truong MD  Encounter Date: 10/22/2024   Encounter department: [facility]    Assessment & Plan  Psoriasis  Patient initially presented due to arthralgias however no longer has them, is not having any complaints. Psoriasis has been well controlled and is only present in the back of her ear. Denies joint pain and exam is unremarkable.   She does not need to follow up with Rheumatology.           History of Present Illness       Jolynn Guillory is a 63 y.o. female with hx of psoriasis not on medication, who presents with f/u for knee pain. Patient was seen previously for bilateral knee pain thought to be due to OA and not psoriatic in nature given the lack of synovitis and exam findings. Patient was started on voltaren gel and has had improvement in her pain. She does not have knee pain anymore, has no difficulties walking. Denies worsening psoriasis and does not take any medications.        Review of Systems   Constitutional:  Negative for chills and fever.   HENT:  Negative for ear pain and sore throat.    Eyes:  Negative for pain and visual disturbance.   Respiratory:  Negative for cough and shortness of breath.    Cardiovascular:  Negative for chest pain and palpitations.   Gastrointestinal:  Negative for abdominal pain and vomiting.   Genitourinary:  Negative for dysuria and hematuria.   Musculoskeletal:  Negative for arthralgias and back pain.   Skin:  Negative for color change and rash.   Neurological:  Negative for seizures and syncope.   All other systems reviewed and are negative.          Objective     /88 (BP Location: Right arm, Patient Position: Sitting, Cuff Size: Large)   Pulse (!) 111   Temp 98.3 °F (36.8 °C) (Temporal)   Ht 4' 8\" (1.422 m)   Wt 86.6 kg (191 lb)   LMP  (LMP Unknown)   BMI 42.82 kg/m²     Physical Exam  Vitals and nursing note " reviewed.   Constitutional:       General: She is not in acute distress.     Appearance: She is well-developed.   HENT:      Head: Normocephalic and atraumatic.   Eyes:      Conjunctiva/sclera: Conjunctivae normal.   Cardiovascular:      Rate and Rhythm: Normal rate and regular rhythm.      Heart sounds: No murmur heard.  Pulmonary:      Effort: Pulmonary effort is normal. No respiratory distress.      Breath sounds: Normal breath sounds.   Abdominal:      Palpations: Abdomen is soft.      Tenderness: There is no abdominal tenderness.   Musculoskeletal:         General: No swelling or tenderness.      Cervical back: Neck supple.      Right lower leg: No edema.      Left lower leg: No edema.   Skin:     General: Skin is warm and dry.      Capillary Refill: Capillary refill takes less than 2 seconds.      Comments: Small psoriasis plaque behind right ear   Neurological:      Mental Status: She is alert.   Psychiatric:         Mood and Affect: Mood normal.

## 2024-10-23 NOTE — ASSESSMENT & PLAN NOTE
Patient initially presented due to arthralgias however no longer has them, is not having any complaints. Psoriasis has been well controlled and is only present in the back of her ear. Denies joint pain and exam is unremarkable.   She does not need to follow up with Rheumatology.

## 2024-10-24 ENCOUNTER — OFFICE VISIT (OUTPATIENT)
Dept: INTERNAL MEDICINE CLINIC | Facility: CLINIC | Age: 64
End: 2024-10-24

## 2024-10-24 VITALS
DIASTOLIC BLOOD PRESSURE: 83 MMHG | BODY MASS INDEX: 42.74 KG/M2 | TEMPERATURE: 98 F | HEART RATE: 96 BPM | SYSTOLIC BLOOD PRESSURE: 124 MMHG | HEIGHT: 56 IN | WEIGHT: 190 LBS

## 2024-10-24 DIAGNOSIS — Z12.11 ENCOUNTER FOR SCREENING COLONOSCOPY: ICD-10-CM

## 2024-10-24 DIAGNOSIS — Z23 NEED FOR COVID-19 VACCINE: ICD-10-CM

## 2024-10-24 DIAGNOSIS — Z00.00 ANNUAL PHYSICAL EXAM: Primary | ICD-10-CM

## 2024-10-24 DIAGNOSIS — Z23 ENCOUNTER FOR IMMUNIZATION: ICD-10-CM

## 2024-10-24 DIAGNOSIS — F34.1 PERSISTENT DEPRESSIVE DISORDER: ICD-10-CM

## 2024-10-24 DIAGNOSIS — R73.03 PREDIABETES: ICD-10-CM

## 2024-10-24 PROCEDURE — 90480 ADMN SARSCOV2 VAC 1/ONLY CMP: CPT | Performed by: STUDENT IN AN ORGANIZED HEALTH CARE EDUCATION/TRAINING PROGRAM

## 2024-10-24 PROCEDURE — 90472 IMMUNIZATION ADMIN EACH ADD: CPT | Performed by: STUDENT IN AN ORGANIZED HEALTH CARE EDUCATION/TRAINING PROGRAM

## 2024-10-24 PROCEDURE — 90750 HZV VACC RECOMBINANT IM: CPT | Performed by: STUDENT IN AN ORGANIZED HEALTH CARE EDUCATION/TRAINING PROGRAM

## 2024-10-24 PROCEDURE — 90673 RIV3 VACCINE NO PRESERV IM: CPT | Performed by: STUDENT IN AN ORGANIZED HEALTH CARE EDUCATION/TRAINING PROGRAM

## 2024-10-24 PROCEDURE — 91320 SARSCV2 VAC 30MCG TRS-SUC IM: CPT | Performed by: STUDENT IN AN ORGANIZED HEALTH CARE EDUCATION/TRAINING PROGRAM

## 2024-10-24 PROCEDURE — 99396 PREV VISIT EST AGE 40-64: CPT | Performed by: STUDENT IN AN ORGANIZED HEALTH CARE EDUCATION/TRAINING PROGRAM

## 2024-10-24 PROCEDURE — 90678 RSV VACC PREF BIVALENT IM: CPT | Performed by: STUDENT IN AN ORGANIZED HEALTH CARE EDUCATION/TRAINING PROGRAM

## 2024-10-24 PROCEDURE — 90471 IMMUNIZATION ADMIN: CPT | Performed by: STUDENT IN AN ORGANIZED HEALTH CARE EDUCATION/TRAINING PROGRAM

## 2024-10-24 RX ORDER — ZOLPIDEM TARTRATE 10 MG/1
10 TABLET ORAL
COMMUNITY
Start: 2024-10-15

## 2024-10-24 RX ORDER — ARIPIPRAZOLE 15 MG/1
15 TABLET ORAL DAILY
COMMUNITY
Start: 2024-10-04

## 2024-10-24 RX ORDER — ALPRAZOLAM 0.5 MG
0.5 TABLET ORAL 2 TIMES DAILY
COMMUNITY
Start: 2024-10-15

## 2024-10-24 NOTE — ASSESSMENT & PLAN NOTE
Orders:    influenza vaccine, recombinant, PF, 0.5 mL IM (Flublok)    Zoster Vaccine Recombinant IM    Respiratory Syncytial Virus (RSV) vaccine (recombinant) (Abrysvo)

## 2024-10-24 NOTE — PROGRESS NOTES
Adult Annual Physical  Name: Jolynn Guillory      : 1960      MRN: [id number]  Encounter Provider: Katerina Cooney DO  Encounter Date: 10/24/2024   Encounter department: [facility]    Assessment & Plan  Encounter for immunization    Orders:    influenza vaccine, recombinant, PF, 0.5 mL IM (Flublok)    Zoster Vaccine Recombinant IM    Respiratory Syncytial Virus (RSV) vaccine (recombinant) (Abrysvo)    Need for COVID-19 vaccine    Orders:    COVID-19 Pfizer mRNA vaccine 12 yr and older (Comirnaty pre-filled syringe)    Encounter for screening colonoscopy    Orders:    Ambulatory referral to Gastroenterology; Future    Annual physical exam  - DM screening: UTD  - CV screening: UTD  - Colon CA screening: ordered  - Breast CA screening: scheduled 2025  - Cervical CA screening: UTD  - Osteoporosis screening: not indicated    Counseled on:  - Obesity: see below for more details, counseled on importance of weight loss  - ACP documentation, pt has not completed POLST, no living will, no POA   [place]      Immunization:  - Shingrix administered yes  - Flu administered yes  - COVID 19 administered yes      Return:  - in 1 year for next annual or PRN for any acute complains/issues        BMI 40.0-44.9, adult (HCC)  - pt with BMI of Body mass index is 42.6 kg/m².  - pt counseled on risks associated with obesity and it's contribution to other health issues  - advise portion control, healthy food choices, drinking water instead of juice/soda  - advise beginning light exercise program (i.e. Walking 30min 4-5x/wk) or consider gym membership  - advise keeping food diary to help identify problem foods/times/stressors  -consider nutrition referral  - pt advised that weight loss of ~ 1lb/wk is a good goal and not to become frustrated if loss is slower  -consider adipex vs topimax, though adipex less cost prohibitive  -pt A1c prediabetic, thus does qualify for GLP1+/DPP4-I,  but patient declining injectables at this time  -as BMI < 35 / >35, does qualify for bariatric surgery at this time  though would benefit from referral if refractory weight loss to conservative measures           Prediabetes  Recheck A1c 6 months  Encouraged healthy diet and lifestyle modifications  Likely compounded by obesity and psych mediation regimen  Declining medication at this time, both PO and injectable, thus to revisit topic next appointment       Persistent depressive disorder  Stable  PHQ2 in office today    Continue with psych   Deferring medications changes and refills to them  Continue psych regimen: BZD 0.5 BID, ambien 10mg qhs, wellbutrin 150 daily, abilify 15mg daily  Continue with Vit D supplementation  Continue healthy lifestyle modifications  Continue sleep hygiene and melatonin           Immunizations and preventive care screenings were discussed with patient today. Appropriate education was printed on patient's after visit summary.    Counseling:  Alcohol/drug use: discussed moderation in alcohol intake, the recommendations for healthy alcohol use, and avoidance of illicit drug use.  Dental Health: discussed importance of regular tooth brushing, flossing, and dental visits.  Injury prevention: discussed safety/seat belts, safety helmets, smoke detectors, carbon monoxide detectors, and smoking near bedding or upholstery.  Sexual health: discussed sexually transmitted diseases, partner selection, use of condoms, avoidance of unintended pregnancy, and contraceptive alternatives.  Exercise: the importance of regular exercise/physical activity was discussed. Recommend exercise 3-5 times per week for at least 30 minutes.          History of Present Illness     Adult Annual Physical:  Patient presents for annual physical. prediabetes, psoriasis, obesity, GERD, depression, L retinal detachment s/p sx, MDD. No current complaints. Presents with  in room who confirms HPI and supportive of  patient, although chauvinistic with this physician    PHQ 2; likes to go to [employer], walking, and is compliant with medications and seeing psych.      .     Diet and Physical Activity:  - Diet/Nutrition: well balanced diet.  - Exercise: walking, 1-2 times a week on average and less than 30 minutes on average.    Depression Screening:  - PHQ-2 Score: 2    General Health:  - Sleep: 7-8 hours of sleep on average and snores loudly.  - Hearing: normal hearing bilateral ears.  - Vision: no vision problems, goes for regular eye exams, wears glasses and most recent eye exam < 1 year ago.  - Dental: regular dental visits and brushes teeth twice daily.    Review of Systems   Constitutional:  Negative for appetite change, chills, fatigue and fever.   HENT:  Negative for congestion and trouble swallowing.    Eyes:  Negative for visual disturbance.   Respiratory:  Negative for cough, chest tightness and shortness of breath.    Cardiovascular:  Negative for chest pain and palpitations.   Gastrointestinal:  Negative for abdominal pain, constipation, diarrhea, nausea and vomiting.   Neurological:  Negative for dizziness, light-headedness and headaches.   Psychiatric/Behavioral:  Positive for dysphoric mood. Negative for sleep disturbance. The patient is not nervous/anxious.      Pertinent Medical History       Current Outpatient Medications on File Prior to Visit   Medication Sig Dispense Refill    ALPRAZolam (XANAX) 0.5 mg tablet Take 0.5 mg by mouth 2 (two) times a day      ARIPiprazole (ABILIFY) 15 mg tablet Take 15 mg by mouth daily      zolpidem (AMBIEN) 10 mg tablet Take 10 mg by mouth daily at bedtime      acetaminophen (TYLENOL) 325 mg tablet Take 2 tablets (650 mg total) by mouth every 6 (six) hours as needed for mild pain or fever 30 tablet 0    Blood Pressure KIT by Does not apply route daily 1 each 0    buPROPion (WELLBUTRIN XL) 150 mg 24 hr tablet Take 1 tablet (150 mg total) by mouth daily 30 tablet 0     "Cholecalciferol (VITAMIN D3) 1,000 units tablet Take 1 tablet (1,000 Units total) by mouth daily 30 tablet 0    gabapentin (NEURONTIN) 100 mg capsule Take 2 capsules (200 mg total) by mouth 3 (three) times a day 180 capsule 0    loratadine (CLARITIN) 10 mg tablet Take 1 tablet (10 mg total) by mouth daily 30 tablet 0    melatonin 3 mg Take 1 tablet (3 mg total) by mouth daily at bedtime 30 tablet 0    vitamin B-12 (VITAMIN B-12) 500 mcg tablet Take 1 tablet (500 mcg total) by mouth daily 30 tablet 0    [DISCONTINUED] traZODone (DESYREL) 100 mg tablet Take 1 tablet (100 mg total) by mouth daily at bedtime 30 tablet 0     No current facility-administered medications on file prior to visit.      Social History     Tobacco Use    Smoking status: Never    Smokeless tobacco: Never   Vaping Use    Vaping status: Never Used   Substance and Sexual Activity    Alcohol use: No    Drug use: No    Sexual activity: Not Currently     Partners: Male     Birth control/protection: Condom Female       Objective     /83 (BP Location: Right arm, Patient Position: Sitting, Cuff Size: Large)   Pulse 96   Temp 98 °F (36.7 °C) (Temporal)   Ht 4' 8\" (1.422 m)   Wt 86.2 kg (190 lb)   LMP  (LMP Unknown)   BMI 42.60 kg/m²     Physical Exam  Constitutional:       General: She is not in acute distress.     Appearance: She is well-developed.   HENT:      Head: Normocephalic and atraumatic.      Right Ear: Tympanic membrane, ear canal and external ear normal. There is no impacted cerumen.      Left Ear: Tympanic membrane, ear canal and external ear normal.      Nose: Nose normal.      Mouth/Throat:      Mouth: Mucous membranes are moist.   Eyes:      General: No scleral icterus.     Conjunctiva/sclera: Conjunctivae normal.      Pupils: Pupils are equal, round, and reactive to light.   Neck:      Thyroid: No thyromegaly.   Cardiovascular:      Rate and Rhythm: Normal rate and regular rhythm.      Heart sounds: Normal heart sounds. No " murmur heard.     No friction rub. No gallop.   Pulmonary:      Effort: Pulmonary effort is normal. No respiratory distress.      Breath sounds: Normal breath sounds. No stridor. No wheezing or rales.   Abdominal:      General: Bowel sounds are normal. There is no distension.      Palpations: Abdomen is soft. There is no mass.      Tenderness: There is no abdominal tenderness. There is no guarding or rebound.   Musculoskeletal:         General: No deformity.      Cervical back: Neck supple.      Right lower leg: No edema.      Left lower leg: No edema.   Skin:     General: Skin is warm.      Capillary Refill: Capillary refill takes less than 2 seconds.      Findings: No erythema or rash.   Neurological:      Mental Status: She is alert.   Psychiatric:         Mood and Affect: Mood normal.         Behavior: Behavior normal.         Thought Content: Thought content normal.         Judgment: Judgment normal.      Comments: Mild akathisia, decreased blinking and soft voice, no torticolis        Administrative Statements   I have spent a total time of 35 minutes in caring for this patient on the day of the visit/encounter including Diagnostic results, Prognosis, Risks and benefits of tx options, Instructions for management, Patient and family education, Importance of tx compliance, Risk factor reductions, Impressions, Counseling / Coordination of care, Documenting in the medical record, Reviewing / ordering tests, medicine, procedures  , Obtaining or reviewing history  , and Communicating with other healthcare professionals .

## 2024-10-24 NOTE — ASSESSMENT & PLAN NOTE
- DM screening: UTD  - CV screening: UTD  - Colon CA screening: ordered  - Breast CA screening: scheduled jan 2025  - Cervical CA screening: UTD  - Osteoporosis screening: not indicated    Counseled on:  - Obesity: see below for more details, counseled on importance of weight loss  - ACP documentation, pt has not completed POLST, no living will, no POA   [place]      Immunization:  - Shingrix administered yes  - Flu administered yes  - COVID 19 administered yes      Return:  - in 1 year for next annual or PRN for any acute complains/issues

## 2024-10-24 NOTE — ASSESSMENT & PLAN NOTE
- pt with BMI of Body mass index is 42.6 kg/m².  - pt counseled on risks associated with obesity and it's contribution to other health issues  - advise portion control, healthy food choices, drinking water instead of juice/soda  - advise beginning light exercise program (i.e. Walking 30min 4-5x/wk) or consider gym membership  - advise keeping food diary to help identify problem foods/times/stressors  -consider nutrition referral  - pt advised that weight loss of ~ 1lb/wk is a good goal and not to become frustrated if loss is slower  -consider adipex vs topimax, though adipex less cost prohibitive  -pt A1c prediabetic, thus does qualify for GLP1+/DPP4-I, but patient declining injectables at this time  -as BMI < 35 / >35, does qualify for bariatric surgery at this time  though would benefit from referral if refractory weight loss to conservative measures

## 2024-10-24 NOTE — ASSESSMENT & PLAN NOTE
Stable  PHQ2 in office today    Continue with psych   Deferring medications changes and refills to them  Continue psych regimen: BZD 0.5 BID, ambien 10mg qhs, wellbutrin 150 daily, abilify 15mg daily  Continue with Vit D supplementation  Continue healthy lifestyle modifications  Continue sleep hygiene and melatonin

## 2024-10-24 NOTE — ASSESSMENT & PLAN NOTE
Recheck A1c 6 months  Encouraged healthy diet and lifestyle modifications  Likely compounded by obesity and psych mediation regimen  Declining medication at this time, both PO and injectable, thus to revisit topic next appointment

## 2024-10-24 NOTE — PATIENT INSTRUCTIONS
"Patient Education     Routine physical for adults   The Basics   Written by the doctors and editors at [facility]   What is a physical? -- A physical is a routine visit, or \"check-up,\" with your doctor. You might also hear it called a \"wellness visit\" or \"preventive visit.\"  During each visit, the doctor will:   Ask about your physical and mental health   Ask about your habits, behaviors, and lifestyle   Do an exam   Give you vaccines if needed   Talk to you about any medicines you take   Give advice about your health   Answer your questions  Getting regular check-ups is an important part of taking care of your health. It can help your doctor find and treat any problems you have. But it's also important for preventing health problems.  A routine physical is different from a \"sick visit.\" A sick visit is when you see a doctor because of a health concern or problem. Since physicals are scheduled ahead of time, you can think about what you want to ask the doctor.  How often should I get a physical? -- It depends on your age and health. In general, for people age 21 years and older:   If you are younger than 50 years, you might be able to get a physical every 3 years.   If you are 50 years or older, your doctor might recommend a physical every year.  If you have an ongoing health condition, like diabetes or high blood pressure, your doctor will probably want to see you more often.  What happens during a physical? -- In general, each visit will include:   Physical exam - The doctor or nurse will check your height, weight, heart rate, and blood pressure. They will also look at your eyes and ears. They will ask about how you are feeling and whether you have any symptoms that bother you.   Medicines - It's a good idea to bring a list of all the medicines you take to each doctor visit. Your doctor will talk to you about your medicines and answer any questions. Tell them if you are having any side effects that bother you. You " "should also tell them if you are having trouble paying for any of your medicines.   Habits and behaviors - This includes:   Your diet   Your exercise habits   Whether you smoke, drink alcohol, or use drugs   Whether you are sexually active   Whether you feel safe at home  Your doctor will talk to you about things you can do to improve your health and lower your risk of health problems. They will also offer help and support. For example, if you want to quit smoking, they can give you advice and might prescribe medicines. If you want to improve your diet or get more physical activity, they can help you with this, too.   Lab tests, if needed - The tests you get will depend on your age and situation. For example, your doctor might want to check your:   Cholesterol   Blood sugar   Iron level   Vaccines - The recommended vaccines will depend on your age, health, and what vaccines you already had. Vaccines are very important because they can prevent certain serious or deadly infections.   Discussion of screening - \"Screening\" means checking for diseases or other health problems before they cause symptoms. Your doctor can recommend screening based on your age, risk, and preferences. This might include tests to check for:   Cancer, such as breast, prostate, cervical, ovarian, colorectal, prostate, lung, or skin cancer   Sexually transmitted infections, such as chlamydia and gonorrhea   Mental health conditions like depression and anxiety  Your doctor will talk to you about the different types of screening tests. They can help you decide which screenings to have. They can also explain what the results might mean.   Answering questions - The physical is a good time to ask the doctor or nurse questions about your health. If needed, they can refer you to other doctors or specialists, too.  Adults older than 65 years often need other care, too. As you get older, your doctor will talk to you about:   How to prevent falling at " home   Hearing or vision tests   Memory testing   How to take your medicines safely   Making sure that you have the help and support you need at home  All topics are updated as new evidence becomes available and our peer review process is complete.  This topic retrieved from [employer] on: May 02, 2024.  Topic 450572 Version 1.0  Release: 32.4.3 - C32.122  © 2024 UpToDate, Inc[employer] and/or its affiliates. All rights reserved.  Consumer Information Use and Disclaimer   Disclaimer: This generalized information is a limited summary of diagnosis, treatment, and/or medication information. It is not meant to be comprehensive and should be used as a tool to help the user understand and/or assess potential diagnostic and treatment options. It does NOT include all information about conditions, treatments, medications, side effects, or risks that may apply to a specific patient. It is not intended to be medical advice or a substitute for the medical advice, diagnosis, or treatment of a health care provider based on the health care provider's examination and assessment of a patient's specific and unique circumstances. Patients must speak with a health care provider for complete information about their health, medical questions, and treatment options, including any risks or benefits regarding use of medications. This information does not endorse any treatments or medications as safe, effective, or approved for treating a specific patient. UpToDate, Inc. and its affiliates disclaim any warranty or liability relating to this information or the use thereof.The use of this information is governed by the Terms of Use, available at https://www.[employer].com/en/know/clinical-effectiveness-terms. 2024© UpToDate, Inc. and its affiliates and/or licensors. All rights reserved.  Copyright   © 2024 Up[employer], [employer] and/or its affiliates. All rights reserved.

## 2024-11-03 NOTE — PROGRESS NOTES
INTERNAL MEDICINE OFFICE VISIT  [facility]  [address] [facility] [place] [address]    NAME: Molly Strickland  AGE: 56 y o  SEX: female    DATE OF ENCOUNTER: 11/5/2018    Assessment and Plan     1  Back Pain  -patient presents with a one-week history of bilateral lumbar back pain without radiculopathy  No inciting trauma or falls  Patient states that her pain is subjectively improving  Likely musculoskeletal etiology with muscle spasm   -prescribed methocarbamol and Bijan-Connors cream for additional pain relief  -encourage patient to ambulate as tolerated and apply heating pad as needed  -advised patient to call the office back if her symptoms do not improve or worsen    Chief Complaint     Back Pain    History of Present Illness     Ms Molly Strickland is a 56year old female with a past medical history significant for GERD, anxiety, and depression who presents today for a same day visit for back pain  Started approximately 1 week ago when she woke up from bed  Patient denies any recent falls or trauma  The pain was initially described as being very intense, 10/10 in intensity  Pain was located in a bandlike distribution in the patient's lower lumbar area and was nonradiating  The patient states that she tried taking ibuprofen and Naprosyn with minimal relief  The patient denies any paresthesias, leg weakness, or changes in urinary habits since the onset of her back pain  The patient states that her pain has improved slightly and that she is feeling more stable on her feet  She otherwise denies any exacerbating or alleviating factors  Review of Systems     Review of Systems   Constitutional: Negative for chills and fever  HENT: Negative for rhinorrhea, sinus pain and trouble swallowing  Eyes: Negative for photophobia and visual disturbance  Respiratory: Negative for cough, choking and wheezing      Cardiovascular: Negative for chest pain, palpitations and leg swelling  Gastrointestinal: Negative for abdominal distention, diarrhea, nausea and vomiting  Genitourinary: Negative for difficulty urinating, dysuria, frequency, pelvic pain and urgency  Musculoskeletal: Positive for back pain  Negative for arthralgias, gait problem, joint swelling and neck stiffness  Skin: Negative for color change, pallor and rash  Neurological: Negative for dizziness, light-headedness and headaches  Psychiatric/Behavioral: Negative for agitation and behavioral problems  Active Problem List     Patient Active Problem List   Diagnosis    Encounter for hepatitis C screening test for low risk patient    Viral upper respiratory tract infection    Depression    Stress incontinence    Insomnia    Colon cancer screening    Breast cancer screening    Neuropathy    Cervical paraspinal muscle spasm    Elevated glucose       Objective     There were no vitals taken for this visit  Physical Exam   Constitutional: She is oriented to person, place, and time  She appears well-developed and well-nourished  No distress  HENT:   Head: Normocephalic and atraumatic  Eyes: Pupils are equal, round, and reactive to light  EOM are normal    Cardiovascular: Normal rate, regular rhythm and normal heart sounds  No murmur heard  Pulmonary/Chest: Effort normal and breath sounds normal  No respiratory distress  She has no wheezes  She has no rales  Abdominal: Soft  Bowel sounds are normal  She exhibits no distension  There is no tenderness  Musculoskeletal: Normal range of motion  She exhibits no edema  Mild bilateral lumbar tenderness   Neurological: She is alert and oriented to person, place, and time  She displays normal reflexes  Coordination normal    Negative bilateral straight leg raise test   Patient is able to squat and ambulate without issue   Skin: Skin is warm and dry  No rash noted  No erythema  Psychiatric: She has a normal mood and affect   Her behavior is normal  Pertinent Laboratory/Diagnostic Studies:  CBC:   Lab Results   Component Value Date/Time    WBC 7 76 06/19/2018 03:05 PM    RBC 4 45 06/19/2018 03:05 PM    HGB 12 6 06/19/2018 03:05 PM    HCT 40 0 06/19/2018 03:05 PM    MCV 90 06/19/2018 03:05 PM    MCH 28 3 06/19/2018 03:05 PM    MCHC 31 5 06/19/2018 03:05 PM    RDW 12 7 06/19/2018 03:05 PM    MPV 12 5 06/19/2018 03:05 PM     06/19/2018 03:05 PM    NRBC 0 06/19/2018 03:05 PM    NEUTOPHILPCT 70 06/19/2018 03:05 PM    LYMPHOPCT 21 06/19/2018 03:05 PM    MONOPCT 8 06/19/2018 03:05 PM    EOSPCT 1 06/19/2018 03:05 PM    BASOPCT 0 06/19/2018 03:05 PM    NEUTROABS 5 40 06/19/2018 03:05 PM    LYMPHSABS 1 61 06/19/2018 03:05 PM    MONOSABS 0 59 06/19/2018 03:05 PM    EOSABS 0 11 06/19/2018 03:05 PM     Chemistry Profile:   Lab Results   Component Value Date/Time    K 4 2 05/23/2018 08:18 AM     05/23/2018 08:18 AM    CO2 28 05/23/2018 08:18 AM    BUN 13 05/23/2018 08:18 AM    CREATININE 0 70 05/23/2018 08:18 AM    GLUC 102 01/21/2016 11:58 AM    GLUF 124 (H) 05/23/2018 08:18 AM    CALCIUM 9 0 05/23/2018 08:18 AM    AST 26 05/23/2018 08:18 AM    ALT 40 05/23/2018 08:18 AM    ALKPHOS 56 05/23/2018 08:18 AM    EGFR 98 05/23/2018 08:18 AM     Endocrine Studies:   Lab Results   Component Value Date/Time    HGBA1C 6 0 06/19/2018 03:05 PM    OOA3YELGAJEJ 2 030 01/21/2016 11:58 AM     Health Maintenance:   Lab Results   Component Value Date/Time    HEPCAB Non-Reactive (q 09/04/2014 02:07 PM     Toxicology:   Lab Results   Component Value Date/Time    BARBTUR Negative 10/27/2018 02:52 AM    BARBTUR Negative 11/04/2015 05:25 PM    BDZUR Negative 10/27/2018 02:52 AM    BDZUR Negative 11/04/2015 05:25 PM    COCAINEUR Negative 10/27/2018 02:52 AM    COCAINEUR Negative 11/04/2015 05:25 PM    OPIATEUR Negative 10/27/2018 02:52 AM    OPIATEUR Negative 11/04/2015 05:25 PM    PCPUR Negative 10/27/2018 02:52 AM    PCPUR Negative 11/04/2015 05:25 PM    THCUR Negative 10/27/2018 02:52 AM    THCUR Negative 11/04/2015 05:25 PM    ETOH <3 10/15/2018 08:33 PM     Urinalysis:   Lab Results   Component Value Date/Time    COLORU Yellow 04/12/2018 10:56 AM    CLARITYU Clear 04/12/2018 10:56 AM    SPECGRAV 1 016 04/12/2018 10:56 AM    PHUR 7 0 04/12/2018 10:56 AM    LEUKOCYTESUR Negative 04/12/2018 10:56 AM    NITRITE Negative 04/12/2018 10:56 AM    GLUCOSEU Negative 04/12/2018 10:56 AM    KETONESU Negative 04/12/2018 10:56 AM    BILIRUBINUR Negative 04/12/2018 10:56 AM    BLOODU Negative 04/12/2018 10:56 AM      Hematology:   Lab Results   Component Value Date/Time    HAXOYGIU11 426 05/23/2018 08:18 AM     ID Studies:   Lab Results   Component Value Date/Time    HEPBSAG Non-Reactive (q 09/04/2014 02:07 PM    HEPAIGM Non-Reactive (q 09/04/2014 02:07 PM    HEPCAB Non-Reactive (q 09/04/2014 02:07 PM    HEPBIGM Non-Reactive (q 09/04/2014 02:07 PM     Rheumatologic Studies:   Lab Results   Component Value Date/Time    HEPBSAG Non-Reactive (q 09/04/2014 02:07 PM    HEPAIGM Non-Reactive (q 09/04/2014 02:07 PM    HEPCAB Non-Reactive (q 09/04/2014 02:07 PM    HEPBIGM Non-Reactive (q 09/04/2014 02:07 PM       Current Medications     Current Outpatient Prescriptions:     [employer] TUSSIN -10 MG/5ML oral liquid, TAKE 10 ML BY MOUTH DAILY AT BEDTIME AS NEEDED (COUGH), Disp: , Rfl: 0    cyclobenzaprine (FLEXERIL) 5 mg tablet, Take 2 tablets (10 mg total) by mouth 3 (three) times a day as needed for muscle spasms, Disp: 30 tablet, Rfl: 0    Dextromethorphan-Guaifenesin (DELSYM COUGH/CHEST CONGEST DM) 5-100 MG/5ML LIQD, Take 10 mL by mouth daily at bedtime as needed (cough), Disp: 118 mL, Rfl: 0    docusate sodium (COLACE) 100 mg capsule, Take by mouth, Disp: , Rfl:     fluticasone (FLONASE) 50 mcg/act nasal spray, 2 sprays into each nostril daily, Disp: , Rfl:     hydrocortisone 1 % cream, Apply topically 4 (four) times a day as needed for rash (itching), Disp: 30 g, Rfl: 0    loratadine (CLARITIN) 10 mg tablet, Take 1 tablet (10 mg total) by mouth daily, Disp: 30 tablet, Rfl: 2    Misc   Devices (SITZ BATH) MISC, by Does not apply route, Disp: , Rfl:     naproxen (NAPROSYN) 500 mg tablet, Take 1 tablet (500 mg total) by mouth every 12 (twelve) hours as needed for mild pain, Disp: 60 tablet, Rfl: 0    omeprazole (PriLOSEC) 40 MG capsule, Take 1 capsule by mouth daily, Disp: , Rfl:     PARoxetine (PAXIL) 20 mg tablet, Take 1 tablet (20 mg total) by mouth daily, Disp: 5 tablet, Rfl: 0    traZODone (DESYREL) 50 mg tablet, TAKE 1 TABLET BY MOUTH AT BEDTIME, MAY TAKE UP TO 2 TABLETS FOR SLEEP, Disp: , Rfl: 1    zolpidem (AMBIEN) 10 mg tablet, Take 10 mg by mouth daily, Disp: , Rfl: 1    Health Maintenance     Health Maintenance   Topic Date Due    CRC Screening: Colonoscopy  11/19/2024    DTaP,Tdap,and Td Vaccines (2 - Td) 08/30/2026    INFLUENZA VACCINE  Completed     Immunization History   Administered Date(s) Administered    Influenza 11/02/2017    Influenza Quadrivalent Preservative Free 3 years and older IM 01/21/2016    Tdap 08/30/2016       Manish Hines DO  PGY -1 internal Medicine  11/5/2018 11:25 AM done

## 2025-01-14 ENCOUNTER — HOSPITAL ENCOUNTER (OUTPATIENT)
Dept: RADIOLOGY | Age: 65
Discharge: HOME | End: 2025-01-14
Payer: COMMERCIAL

## 2025-01-14 VITALS — BODY MASS INDEX: 37.7 KG/M2 | WEIGHT: 187 LBS | HEIGHT: 59 IN

## 2025-01-14 VITALS — HEIGHT: 56 IN | BODY MASS INDEX: 42.75 KG/M2 | WEIGHT: 190.04 LBS

## 2025-01-14 DIAGNOSIS — Z12.31 ENCOUNTER FOR SCREENING MAMMOGRAM FOR BREAST CANCER: ICD-10-CM

## 2025-01-14 DIAGNOSIS — Z13.9 SCREENING DUE: ICD-10-CM

## 2025-01-14 PROCEDURE — 77063 BREAST TOMOSYNTHESIS BI: CPT

## 2025-01-14 PROCEDURE — 77067 SCR MAMMO BI INCL CAD: CPT

## 2025-01-14 PROCEDURE — 77080 DXA BONE DENSITY AXIAL: CPT

## 2025-01-15 ENCOUNTER — TELEPHONE (OUTPATIENT)
Dept: INTERNAL MEDICINE CLINIC | Facility: CLINIC | Age: 65
End: 2025-01-15

## 2025-01-15 DIAGNOSIS — H33.22 LEFT RETINAL DETACHMENT: Primary | ICD-10-CM

## 2025-01-15 DIAGNOSIS — R73.03 PREDIABETES: ICD-10-CM

## 2025-01-15 NOTE — TELEPHONE ENCOUNTER
Patients daughter called in requesting Dr Cooney send a referral to [facility] so patient may continue care there for ophthalmology. Patient was attending [facility] for ophthalmology, however her provider left and she now wants to attend [facility]. Fax number is [phone]. Call daughter to make aware when completed.

## 2025-01-16 NOTE — TELEPHONE ENCOUNTER
Left vm for daughter making her aware referral was placed and I would be faxing over. Called daughter again to confirm fax number as fax was not gong through. Mailing to daughter and they will be bringing referral to eye doctor.

## 2025-01-23 DIAGNOSIS — M85.89 OSTEOPENIA OF MULTIPLE SITES: Primary | ICD-10-CM

## 2025-01-23 RX ORDER — B-COMPLEX WITH VITAMIN C
2 TABLET ORAL
Qty: 60 TABLET | Refills: 5 | Status: SHIPPED | OUTPATIENT
Start: 2025-01-23

## 2025-02-13 DIAGNOSIS — Z12.11 ENCOUNTER FOR SCREENING COLONOSCOPY: Primary | ICD-10-CM

## 2025-04-28 PROBLEM — Z01.818 PRE-OP EXAMINATION: Status: ACTIVE | Noted: 2025-04-28

## 2025-04-29 ENCOUNTER — CONSULT (OUTPATIENT)
Dept: INTERNAL MEDICINE CLINIC | Facility: CLINIC | Age: 65
End: 2025-04-29

## 2025-04-29 VITALS
WEIGHT: 178 LBS | TEMPERATURE: 98 F | DIASTOLIC BLOOD PRESSURE: 79 MMHG | BODY MASS INDEX: 36.57 KG/M2 | SYSTOLIC BLOOD PRESSURE: 116 MMHG | OXYGEN SATURATION: 98 % | HEART RATE: 92 BPM

## 2025-04-29 DIAGNOSIS — Z12.12 SCREENING FOR COLORECTAL CANCER: ICD-10-CM

## 2025-04-29 DIAGNOSIS — R73.03 PREDIABETES: ICD-10-CM

## 2025-04-29 DIAGNOSIS — Z12.11 SCREENING FOR COLORECTAL CANCER: ICD-10-CM

## 2025-04-29 DIAGNOSIS — Z01.818 PRE-OP EXAMINATION: Primary | ICD-10-CM

## 2025-04-29 DIAGNOSIS — E66.9 OBESITY (BMI 35.0-39.9 WITHOUT COMORBIDITY): ICD-10-CM

## 2025-04-29 NOTE — ASSESSMENT & PLAN NOTE
Risk Factor Score (Yes=1, No=0)   Hx of TIA/CVA   0   Hx of prior ischemic heart disease (AMI, unstable angina, Q waves on EKG, CABG)   0   Hx of congestive heart failure   0   Serum Creatinine >2 mg/dl   0   Insulin dependent diabetes mellitus   0   Total Score   0     Risk of MACE (30-day)     Points Risk % (95% CI), Shree, 2017 Risk % (95% CI) Jonatan, 1999   0 3.9 (2.8-5.4) 0.4 (0.05-1.5)   1 6 (4.9-7.4) 0.9 (0.3-2.1)   2 10.1 (8.1-12.6) 6.6 (3.9-10.3)   3 or more 15 (11.1-20) >11 (5.8-18.4)       Advice: patient low risk for low risk surgery, specialist may proceed as they see fit during day of procedure    In patients with elevated risk (RCRI >/= 2), assess functional capacity (METs/DASI).   If >4 METs functional capacity, may proceed to surgery. If unknown/poor (<4 METs) functional capacity consider, may need preoperative cardiac testing depending on symptoms and if testing will .

## 2025-04-29 NOTE — PROGRESS NOTES
Name: Jolynn Guillory      : 1960      MRN: [id number]  Encounter Provider: Katerina Cooney DO  Encounter Date: 2025   Encounter department: [facility]    Assessment & Plan  Pre-op examination  Risk Factor Score (Yes=1, No=0)   Hx of TIA/CVA   0   Hx of prior ischemic heart disease (AMI, unstable angina, Q waves on EKG, CABG)   0   Hx of congestive heart failure   0   Serum Creatinine >2 mg/dl   0   Insulin dependent diabetes mellitus   0   Total Score   0     Risk of MACE (30-day)     Points Risk % (95% CI), Shree, 2017 Risk % (95% CI) Jonatan,    0 3.9 (2.8-5.4) 0.4 (0.05-1.5)   1 6 (4.9-7.4) 0.9 (0.3-2.1)   2 10.1 (8.1-12.6) 6.6 (3.9-10.3)   3 or more 15 (11.1-20) >11 (5.8-18.4)       Advice: patient low risk for low risk surgery, specialist may proceed as they see fit during day of procedure    In patients with elevated risk (RCRI >/= 2), assess functional capacity (METs/DASI).   If >4 METs functional capacity, may proceed to surgery. If unknown/poor (<4 METs) functional capacity consider, may need preoperative cardiac testing depending on symptoms and if testing will .                 Obesity (BMI 35.0-39.9 without comorbidity)  - pt counseled on risks associated with obesity and it's contribution to other health issues  - advise portion control, healthy food choices, drinking water instead of juice/soda  - advise beginning light exercise program (i.e. Walking 30min 4-5x/wk) or consider gym membership  - advise keeping food diary to help identify problem foods/times/stressors  -consider nutrition referral  - pt advised that weight loss of ~ 1lb/wk is a good goal and not to become frustrated if loss is slower  -as BMI > 35, does qualify for bariatric surgery at this time, though declines referral at this time         Prediabetes  Recheck A1c 6 months  Encouraged healthy diet and lifestyle modifications  Likely compounded by obesity and psych  mediation regimen  Declining medication at this time, both PO and injectable, thus to revisit topic next appointment         Screening for colorectal cancer  Colonoscopy 2014 WNL  Orders:    Cologuard    BMI Counseling: Body mass index is 36.57 kg/m². The BMI is above normal. Nutrition recommendations include decreasing portion sizes, encouraging healthy choices of fruits and vegetables, consuming healthier snacks, limiting drinks that contain sugar, moderation in carbohydrate intake, increasing intake of lean protein and reducing intake of cholesterol. Exercise recommendations include exercising 3-5 times per week. No pharmacotherapy was ordered. Rationale for BMI follow-up plan is due to patient being overweight or obese.         History of Present Illness     prediabetes, psoriasis, obesity, GERD, depression, L retinal detachment s/p sx who presents for pre-op clearance. Pt scheduled to have b/l eye surgery; L eye , R , specifically cataracts and implant to see better      Review of Systems   Constitutional:  Negative for fever.   HENT:  Negative for congestion.    Eyes:  Negative for visual disturbance.   Respiratory:  Negative for cough and shortness of breath.    Cardiovascular:  Negative for chest pain and palpitations.   Gastrointestinal:  Negative for abdominal pain, constipation, diarrhea, nausea and vomiting.   Neurological:  Negative for dizziness, syncope, light-headedness and headaches.   Psychiatric/Behavioral:  Negative for dysphoric mood and sleep disturbance. The patient is not nervous/anxious.      Past Medical History:   Diagnosis Date    Anemia     Anxiety disorder     Bipolar disorder (HCC)     BMI 34.0-34.9,adult 2023    Psychiatric disorder      Past Surgical History:   Procedure Laterality Date     SECTION      , ,     DENTAL SURGERY      EYE SURGERY Left     TUBAL LIGATION       Family History   Problem Relation Age of Onset    Depression Mother      Diabetes Mother     Arthritis Father     Bipolar disorder Son     Anxiety disorder Son     Depression Son     Anxiety disorder Daughter     Bipolar disorder Daughter     Depression Daughter      Social History     Tobacco Use    Smoking status: Never    Smokeless tobacco: Never   Vaping Use    Vaping status: Never Used   Substance and Sexual Activity    Alcohol use: No    Drug use: No    Sexual activity: Not Currently     Partners: Male     Birth control/protection: Condom Female     Current Outpatient Medications on File Prior to Visit   Medication Sig    acetaminophen (TYLENOL) 325 mg tablet Take 2 tablets (650 mg total) by mouth every 6 (six) hours as needed for mild pain or fever    ALPRAZolam (XANAX) 0.5 mg tablet Take 0.5 mg by mouth 2 (two) times a day    ARIPiprazole (ABILIFY) 15 mg tablet Take 15 mg by mouth daily    Blood Pressure KIT by Does not apply route daily    buPROPion (WELLBUTRIN XL) 150 mg 24 hr tablet Take 1 tablet (150 mg total) by mouth daily    calcium carbonate-vitamin D 500 mg-5 mcg per tablet Take 2 tablets by mouth daily with breakfast    Cholecalciferol (VITAMIN D3) 1,000 units tablet Take 1 tablet (1,000 Units total) by mouth daily    gabapentin (NEURONTIN) 100 mg capsule Take 2 capsules (200 mg total) by mouth 3 (three) times a day    melatonin 3 mg Take 1 tablet (3 mg total) by mouth daily at bedtime    zolpidem (AMBIEN) 10 mg tablet Take 10 mg by mouth daily at bedtime    [DISCONTINUED] loratadine (CLARITIN) 10 mg tablet Take 1 tablet (10 mg total) by mouth daily    [DISCONTINUED] vitamin B-12 (VITAMIN B-12) 500 mcg tablet Take 1 tablet (500 mcg total) by mouth daily     No Known Allergies  Immunization History   Administered Date(s) Administered    COVID-19 PFIZER VACCINE 0.3 ML IM 04/01/2021, 04/22/2021    COVID-19 Pfizer mRNA vacc PF tish-sucrose 12 yr and older (Comirnaty) 10/24/2024    INFLUENZA 11/02/2017, 09/15/2018    Influenza Quadrivalent Preservative Free 3 years and older  IM 01/21/2016    Influenza Recombinant Preservative Free Im 10/24/2024    Influenza, injectable, quadrivalent, preservative free 0.5 mL 09/15/2018    Influenza, recombinant, quadrivalent,injectable, preservative free 09/30/2020    Respiratory Syncytial Virus Vaccine (Recombinant) 10/24/2024    Tdap 08/30/2016    Zoster Vaccine Recombinant 10/24/2024     Objective   /79 (BP Location: Left arm, Patient Position: Sitting, Cuff Size: Large)   Pulse 92   Temp 98 °F (36.7 °C) (Temporal)   Wt 80.7 kg (178 lb)   LMP  (LMP Unknown)   SpO2 98%   BMI 36.57 kg/m²     Physical Exam  Constitutional:       General: She is not in acute distress.     Appearance: She is well-developed. She is obese.   HENT:      Head: Normocephalic and atraumatic.      Right Ear: Tympanic membrane, ear canal and external ear normal. There is no impacted cerumen.      Left Ear: Tympanic membrane, ear canal and external ear normal.      Nose: Nose normal.      Mouth/Throat:      Mouth: Mucous membranes are moist.   Eyes:      General: No scleral icterus.     Conjunctiva/sclera: Conjunctivae normal.      Pupils: Pupils are equal, round, and reactive to light.   Neck:      Thyroid: No thyromegaly.   Cardiovascular:      Rate and Rhythm: Normal rate and regular rhythm.      Heart sounds: Normal heart sounds. No murmur heard.     No friction rub. No gallop.   Pulmonary:      Effort: Pulmonary effort is normal. No respiratory distress.      Breath sounds: Normal breath sounds. No stridor. No wheezing or rales.   Abdominal:      General: Bowel sounds are normal. There is no distension.      Palpations: Abdomen is soft. There is no mass.      Tenderness: There is no abdominal tenderness. There is no guarding or rebound.   Musculoskeletal:         General: No deformity.      Cervical back: Neck supple.      Right lower leg: No edema.      Left lower leg: No edema.   Skin:     General: Skin is warm.      Capillary Refill: Capillary refill takes less  than 2 seconds.      Findings: No erythema or rash.   Neurological:      Mental Status: She is alert and oriented to person, place, and time.   Psychiatric:         Mood and Affect: Mood normal.         Behavior: Behavior normal.         Thought Content: Thought content normal.         Judgment: Judgment normal.      Comments: Mildly flat affect, mild psychomotor slowing/responses, not tangential       Administrative Statements   I have spent a total time of 35 minutes in caring for this patient on the day of the visit/encounter including Diagnostic results, Prognosis, Risks and benefits of tx options, Instructions for management, Patient and family education, Importance of tx compliance, Risk factor reductions, Impressions, Counseling / Coordination of care, Documenting in the medical record, Reviewing/placing orders in the medical record (including tests, medications, and/or procedures), Obtaining or reviewing history  , and Communicating with other healthcare professionals .

## 2025-04-29 NOTE — ASSESSMENT & PLAN NOTE
- pt counseled on risks associated with obesity and it's contribution to other health issues  - advise portion control, healthy food choices, drinking water instead of juice/soda  - advise beginning light exercise program (i.e. Walking 30min 4-5x/wk) or consider gym membership  - advise keeping food diary to help identify problem foods/times/stressors  -consider nutrition referral  - pt advised that weight loss of ~ 1lb/wk is a good goal and not to become frustrated if loss is slower  -as BMI > 35, does qualify for bariatric surgery at this time, though declines referral at this time

## 2025-06-27 ENCOUNTER — OFFICE VISIT (OUTPATIENT)
Dept: INTERNAL MEDICINE CLINIC | Facility: CLINIC | Age: 65
End: 2025-06-27

## 2025-06-27 VITALS
DIASTOLIC BLOOD PRESSURE: 81 MMHG | BODY MASS INDEX: 37.14 KG/M2 | OXYGEN SATURATION: 97 % | WEIGHT: 180.8 LBS | SYSTOLIC BLOOD PRESSURE: 127 MMHG | TEMPERATURE: 97.6 F | HEART RATE: 91 BPM

## 2025-06-27 DIAGNOSIS — F34.1 PERSISTENT DEPRESSIVE DISORDER: Primary | ICD-10-CM

## 2025-06-27 DIAGNOSIS — R53.83 OTHER FATIGUE: ICD-10-CM

## 2025-06-27 NOTE — PROGRESS NOTES
Name: Jolynn Guillory      : 1960      MRN: [id number]  Encounter Provider: Dewey Newell MD  Encounter Date: 2025   Encounter department: [facility] [place]  :  Assessment & Plan  Persistent depressive disorder  Patient with longstanding history of persistent depression, chiefly managed by outpatient mental health provider.  Relevant medications include alprazolam 0.5 mg twice daily, Abilify 15 mg daily, Wellbutrin 150 mg daily, gabapentin 200 mg 3 times daily, and Ambien 10 mg nightly.  PHQ-9 notably elevated as below, and based on the patient and her aide's description of her symptomatology I suspect this is the main  for her fatigue.  She does have an appointment scheduled with her mental health provider later this afternoon to address these concerns and I strongly encouraged her to discuss the possibility of medication changes given her uncontrolled depression.  At this juncture, she denies any SI/HI but was counseled to immediately report to the closest ER should either of these develop.  Although she had relatively recent lab studies (2024), will pursue repeat to ensure that no underlying biochemical/medical cause for patient's worsening depression.  Although patient is currently scheduled to see PCP in October, we have elected to have her return in 1 month for closer interval follow-up.  I have also dictated a letter requesting additional care hours from the patient's aide agency as social isolation seems to be one of the main drivers for her current symptomatology.  We remain available to assist in this regard however able.    Depression Screening Follow-up Plan: Patient's depression screening was positive with a PHQ-9 score of 19. Patient with underlying depression and was advised to continue current medications as prescribed. Patient assessed for underlying major depression. They have no active suicidal ideations. Brief counseling provided and  recommend additional follow-up/re-evaluation next office visit. Continue regular follow-up with their psychologist/therapist/psychiatrist who is managing their mental health condition(s). Patient advised to follow-up with PCP for further management.    Orders:    TSH, 3rd generation with Free T4 reflex; Future    Vitamin D 25 hydroxy; Future    Other fatigue  As above, likely in the setting of patient's worsening depression.  Although recent biochemical workup in September 2024 was relatively normal, we will pursue repeat to ensure no biochemical cause for patient's worsening symptomatology.  Of note, consideration given to the possibility of untreated JAYNA given obesity although patient reports relatively restful sleep and no apneic or snoring episodes; STOP-BANG 2 versus 3 when accounting for neck circumference.  If fatigue persists despite improvement in depressive symptoms, sleep study could be considered for additional evaluation.    Orders:    TSH, 3rd generation with Free T4 reflex; Future    Vitamin D 25 hydroxy; Future    CBC and differential; Future    Iron Panel (Includes Ferritin, Iron Sat%, Iron, and TIBC); Future          Depression Screening and Follow-up Plan: Patient's depression screening was positive with a PHQ-9 score of 19.   Patient assessed for underlying major depression. Brief counseling provided and recommend additional follow-up/re-evaluation next office visit. Continue regular follow-up with their mental health provider who is managing their mental health condition(s). Patient with underlying depression and was advised to continue current medications as prescribed. Patient advised to follow-up with PCP for further management.       History of Present Illness   Patient is a 64-year-old female with past medical history notable for prediabetes, GERD, obesity, and persistent MDD who presents to clinic today for evaluation of worsening fatigue over the preceding 3 weeks.  Present with the  patient today is her aide who also serves as  per her preference.  Prior to today's appointment, patient was last seen in April 2025 with her PCP Dr. Cooney for preoperative clearance preceding eye surgery; at multiple prior visits, the patient has been noted to have some degree of persistent depressive symptoms though these have been managed by her psychiatric provider.    Today, the patient and her aide reports that she has been notably fatigued over the last 3 weeks.  They cannot recall any specific inciting event that triggered the symptoms though do note her depression has significantly worsened over the same timeframe.  In particular, her aide expresses concern that a lack of social support is driving both of these symptoms as the patient is only approved for 4 hours of aide care at a given time.  Although the patient's  is reportedly at home, he himself has his own health issues and the patient's aide is concerned that she is not supported socially when aide staff are not present.  The patient denies any other significant changes in her health over the preceding 3 weeks and denies any symptoms such as cold intolerance, weight gain, pallor, or poor sleep beyond her baseline.  Of note, the patient does have some degree of chronic insomnia but denies any snoring or apneic events.    Depression screening completed with patient and a today resulted with a PHQ-9 score of 19.  On further discussion, the patient is scheduled to see her mental health provider later this afternoon, in part to address the same issues.  At least at this juncture, the patient denies any active SI/HI.      Review of Systems   Constitutional:  Positive for activity change (Minimal activity without aides present), appetite change (Poor appetite, felt secondary to anhedonia) and fatigue. Negative for chills, fever and unexpected weight change.   HENT:  Negative for congestion and rhinorrhea.    Eyes:  Negative for visual  disturbance.   Respiratory:  Negative for shortness of breath.    Cardiovascular:  Negative for chest pain.   Gastrointestinal:  Negative for constipation, diarrhea, nausea and vomiting.   Endocrine: Negative for cold intolerance, heat intolerance, polydipsia, polyphagia and polyuria.   Genitourinary:  Negative for dysuria.   Musculoskeletal:  Negative for arthralgias and myalgias.   Skin:  Negative for pallor and rash.   Neurological:  Negative for dizziness, weakness and light-headedness.   Psychiatric/Behavioral:  Negative for dysphoric mood.        Objective   /81 (BP Location: Right arm, Patient Position: Sitting, Cuff Size: Adult)   Pulse 91   Temp 97.6 °F (36.4 °C) (Temporal)   Wt 82 kg (180 lb 12.8 oz)   LMP  (LMP Unknown)   SpO2 97%   BMI 37.14 kg/m²      Physical Exam  Vitals reviewed.   Constitutional:       General: She is not in acute distress.     Appearance: She is obese. She is not ill-appearing.   HENT:      Head: Normocephalic and atraumatic.      Right Ear: External ear normal.      Left Ear: External ear normal.      Nose: Nose normal.      Mouth/Throat:      Mouth: Mucous membranes are moist.      Pharynx: Oropharynx is clear.     Eyes:      Extraocular Movements: Extraocular movements intact.      Conjunctiva/sclera: Conjunctivae normal.      Pupils: Pupils are equal, round, and reactive to light.       Cardiovascular:      Rate and Rhythm: Normal rate and regular rhythm.      Heart sounds: Normal heart sounds. No murmur heard.  Pulmonary:      Effort: Pulmonary effort is normal. No respiratory distress.      Breath sounds: Normal breath sounds.   Abdominal:      General: Abdomen is flat. There is no distension.      Palpations: Abdomen is soft.     Musculoskeletal:      Right lower leg: No edema.      Left lower leg: No edema.     Skin:     General: Skin is warm and dry.     Neurological:      Mental Status: She is alert. Mental status is at baseline.     Psychiatric:         Mood  and Affect: Mood is depressed.         Behavior: Behavior is slowed.         Thought Content: Thought content does not include homicidal or suicidal ideation. Thought content does not include homicidal or suicidal plan.

## 2025-06-27 NOTE — PATIENT INSTRUCTIONS
-Please have labs completed when able  -Please be sure to discuss your symptoms with your psychiatrist  -If any thoughts to hurt yourself or anyone else, please immediately go to the closest ER  -Follow-up in one month with PCP for reevaluation

## 2025-06-27 NOTE — LETTER
To whom it may concern,    Jolynn Guillory is under my professional care.  At our most recent visit, she was noted to have significant interval worsening of her baseline depression.  On discussion with the patient and her aide, it appears that the primary  of this issue is social isolation.  Her depressive symptoms have been significantly affecting her quality of life.  As such, I am writing to request that she be approved for additional aide hours to both assist with care in the home and social support.  I truly believe that this is in the patient's best interest and I am happy to discuss further.  Please do not hesitate to reach out to me if there is anything else I can do to assist in this regard.    Kindly,    Dewey Newell MD

## 2025-06-27 NOTE — ASSESSMENT & PLAN NOTE
Patient with longstanding history of persistent depression, chiefly managed by outpatient mental health provider.  Relevant medications include alprazolam 0.5 mg twice daily, Abilify 15 mg daily, Wellbutrin 150 mg daily, gabapentin 200 mg 3 times daily, and Ambien 10 mg nightly.  PHQ-9 notably elevated as below, and based on the patient and her aide's description of her symptomatology I suspect this is the main  for her fatigue.  She does have an appointment scheduled with her mental health provider later this afternoon to address these concerns and I strongly encouraged her to discuss the possibility of medication changes given her uncontrolled depression.  At this juncture, she denies any SI/HI but was counseled to immediately report to the closest ER should either of these develop.  Although she had relatively recent lab studies (September 2024), will pursue repeat to ensure that no underlying biochemical/medical cause for patient's worsening depression.  Although patient is currently scheduled to see PCP in October, we have elected to have her return in 1 month for closer interval follow-up.  I have also dictated a letter requesting additional care hours from the patient's aide agency as social isolation seems to be one of the main drivers for her current symptomatology.  We remain available to assist in this regard however able.    Depression Screening Follow-up Plan: Patient's depression screening was positive with a PHQ-9 score of 19. Patient with underlying depression and was advised to continue current medications as prescribed. Patient assessed for underlying major depression. They have no active suicidal ideations. Brief counseling provided and recommend additional follow-up/re-evaluation next office visit. Continue regular follow-up with their psychologist/therapist/psychiatrist who is managing their mental health condition(s). Patient advised to follow-up with PCP for further  management.    Orders:    TSH, 3rd generation with Free T4 reflex; Future    Vitamin D 25 hydroxy; Future

## 2025-07-10 ENCOUNTER — APPOINTMENT (OUTPATIENT)
Dept: LAB | Facility: CLINIC | Age: 65
End: 2025-07-10
Payer: COMMERCIAL

## 2025-07-10 DIAGNOSIS — R53.83 OTHER FATIGUE: ICD-10-CM

## 2025-07-10 DIAGNOSIS — F34.1 PERSISTENT DEPRESSIVE DISORDER: ICD-10-CM

## 2025-07-10 LAB
25(OH)D3 SERPL-MCNC: 24.3 NG/ML (ref 30–100)
BASOPHILS # BLD AUTO: 0.02 THOUSANDS/ÂΜL (ref 0–0.1)
BASOPHILS NFR BLD AUTO: 0 % (ref 0–1)
EOSINOPHIL # BLD AUTO: 0.03 THOUSAND/ÂΜL (ref 0–0.61)
EOSINOPHIL NFR BLD AUTO: 1 % (ref 0–6)
ERYTHROCYTE [DISTWIDTH] IN BLOOD BY AUTOMATED COUNT: 12.1 % (ref 11.6–15.1)
FERRITIN SERPL-MCNC: 81 NG/ML (ref 30–307)
HCT VFR BLD AUTO: 38.9 % (ref 34.8–46.1)
HGB BLD-MCNC: 12.7 G/DL (ref 11.5–15.4)
IMM GRANULOCYTES # BLD AUTO: 0.01 THOUSAND/UL (ref 0–0.2)
IMM GRANULOCYTES NFR BLD AUTO: 0 % (ref 0–2)
IRON SATN MFR SERPL: 30 % (ref 15–50)
IRON SERPL-MCNC: 105 UG/DL (ref 50–212)
LYMPHOCYTES # BLD AUTO: 0.99 THOUSANDS/ÂΜL (ref 0.6–4.47)
LYMPHOCYTES NFR BLD AUTO: 20 % (ref 14–44)
MCH RBC QN AUTO: 28.9 PG (ref 26.8–34.3)
MCHC RBC AUTO-ENTMCNC: 32.6 G/DL (ref 31.4–37.4)
MCV RBC AUTO: 89 FL (ref 82–98)
MONOCYTES # BLD AUTO: 0.45 THOUSAND/ÂΜL (ref 0.17–1.22)
MONOCYTES NFR BLD AUTO: 9 % (ref 4–12)
NEUTROPHILS # BLD AUTO: 3.51 THOUSANDS/ÂΜL (ref 1.85–7.62)
NEUTS SEG NFR BLD AUTO: 70 % (ref 43–75)
NRBC BLD AUTO-RTO: 0 /100 WBCS
PLATELET # BLD AUTO: 193 THOUSANDS/UL (ref 149–390)
PMV BLD AUTO: 12.1 FL (ref 8.9–12.7)
RBC # BLD AUTO: 4.39 MILLION/UL (ref 3.81–5.12)
TIBC SERPL-MCNC: 354.2 UG/DL (ref 250–450)
TRANSFERRIN SERPL-MCNC: 253 MG/DL (ref 203–362)
TSH SERPL DL<=0.05 MIU/L-ACNC: 3.03 UIU/ML (ref 0.45–4.5)
UIBC SERPL-MCNC: 249 UG/DL (ref 155–355)
WBC # BLD AUTO: 5.01 THOUSAND/UL (ref 4.31–10.16)

## 2025-07-10 PROCEDURE — 84443 ASSAY THYROID STIM HORMONE: CPT

## 2025-07-10 PROCEDURE — 36415 COLL VENOUS BLD VENIPUNCTURE: CPT

## 2025-07-10 PROCEDURE — 83550 IRON BINDING TEST: CPT

## 2025-07-10 PROCEDURE — 83540 ASSAY OF IRON: CPT

## 2025-07-10 PROCEDURE — 82306 VITAMIN D 25 HYDROXY: CPT

## 2025-07-10 PROCEDURE — 85025 COMPLETE CBC W/AUTO DIFF WBC: CPT

## 2025-07-10 PROCEDURE — 82728 ASSAY OF FERRITIN: CPT

## 2025-07-19 LAB — COLOGUARD RESULT REPORTABLE: NORMAL
